# Patient Record
Sex: MALE | Race: WHITE | NOT HISPANIC OR LATINO | Employment: FULL TIME | URBAN - METROPOLITAN AREA
[De-identification: names, ages, dates, MRNs, and addresses within clinical notes are randomized per-mention and may not be internally consistent; named-entity substitution may affect disease eponyms.]

---

## 2017-02-13 RX ORDER — VALSARTAN AND HYDROCHLOROTHIAZIDE 320; 25 MG/1; MG/1
TABLET, FILM COATED ORAL
Qty: 90 TAB | Refills: 1 | Status: SHIPPED | OUTPATIENT
Start: 2017-02-13 | End: 2017-08-11 | Stop reason: SDUPTHER

## 2017-02-13 RX ORDER — AMLODIPINE BESYLATE 5 MG/1
TABLET ORAL
Qty: 90 TAB | Refills: 1 | Status: SHIPPED | OUTPATIENT
Start: 2017-02-13 | End: 2017-08-11 | Stop reason: SDUPTHER

## 2017-02-13 RX ORDER — METOPROLOL SUCCINATE 100 MG/1
TABLET, EXTENDED RELEASE ORAL
Qty: 90 TAB | Refills: 1 | Status: SHIPPED | OUTPATIENT
Start: 2017-02-13 | End: 2017-08-11 | Stop reason: SDUPTHER

## 2017-03-08 ENCOUNTER — HOSPITAL ENCOUNTER (OUTPATIENT)
Dept: LAB | Facility: MEDICAL CENTER | Age: 47
End: 2017-03-08
Attending: FAMILY MEDICINE
Payer: COMMERCIAL

## 2017-03-08 ENCOUNTER — OFFICE VISIT (OUTPATIENT)
Dept: MEDICAL GROUP | Facility: PHYSICIAN GROUP | Age: 47
End: 2017-03-08
Payer: COMMERCIAL

## 2017-03-08 VITALS
TEMPERATURE: 97.5 F | BODY MASS INDEX: 33.79 KG/M2 | OXYGEN SATURATION: 96 % | WEIGHT: 236 LBS | HEART RATE: 84 BPM | DIASTOLIC BLOOD PRESSURE: 60 MMHG | HEIGHT: 70 IN | RESPIRATION RATE: 15 BRPM | SYSTOLIC BLOOD PRESSURE: 120 MMHG

## 2017-03-08 DIAGNOSIS — E11.9 DIABETES MELLITUS WITHOUT COMPLICATION (HCC): ICD-10-CM

## 2017-03-08 DIAGNOSIS — R53.83 OTHER FATIGUE: ICD-10-CM

## 2017-03-08 DIAGNOSIS — E55.9 VITAMIN D DEFICIENCY: ICD-10-CM

## 2017-03-08 DIAGNOSIS — I10 ESSENTIAL HYPERTENSION, BENIGN: ICD-10-CM

## 2017-03-08 DIAGNOSIS — E78.2 MIXED HYPERLIPIDEMIA: ICD-10-CM

## 2017-03-08 DIAGNOSIS — N52.8 OTHER MALE ERECTILE DYSFUNCTION: ICD-10-CM

## 2017-03-08 DIAGNOSIS — E66.9 OBESITY (BMI 30.0-34.9): ICD-10-CM

## 2017-03-08 PROBLEM — N52.9 ERECTILE DYSFUNCTION: Status: ACTIVE | Noted: 2017-03-08

## 2017-03-08 LAB
25(OH)D3 SERPL-MCNC: 15 NG/ML (ref 30–100)
ALBUMIN SERPL BCP-MCNC: 4.4 G/DL (ref 3.2–4.9)
ALBUMIN/GLOB SERPL: 1.5 G/DL
ALP SERPL-CCNC: 82 U/L (ref 30–99)
ALT SERPL-CCNC: 28 U/L (ref 2–50)
ANION GAP SERPL CALC-SCNC: 8 MMOL/L (ref 0–11.9)
AST SERPL-CCNC: 14 U/L (ref 12–45)
BASOPHILS # BLD AUTO: 0.04 K/UL (ref 0–0.12)
BASOPHILS NFR BLD AUTO: 0.8 % (ref 0–1.8)
BILIRUB SERPL-MCNC: 2 MG/DL (ref 0.1–1.5)
BUN SERPL-MCNC: 11 MG/DL (ref 8–22)
CALCIUM SERPL-MCNC: 10.4 MG/DL (ref 8.5–10.5)
CHLORIDE SERPL-SCNC: 97 MMOL/L (ref 96–112)
CHOLEST SERPL-MCNC: 162 MG/DL (ref 100–199)
CO2 SERPL-SCNC: 31 MMOL/L (ref 20–33)
CREAT SERPL-MCNC: 0.95 MG/DL (ref 0.5–1.4)
CREAT UR-MCNC: 134.3 MG/DL
EOSINOPHIL # BLD: 0.12 K/UL (ref 0–0.51)
EOSINOPHIL NFR BLD AUTO: 2.3 % (ref 0–6.9)
ERYTHROCYTE [DISTWIDTH] IN BLOOD BY AUTOMATED COUNT: 40.5 FL (ref 35.9–50)
EST. AVERAGE GLUCOSE BLD GHB EST-MCNC: 324 MG/DL
GLOBULIN SER CALC-MCNC: 3 G/DL (ref 1.9–3.5)
GLUCOSE SERPL-MCNC: 348 MG/DL (ref 65–99)
HBA1C MFR BLD: 12.9 % (ref 0–5.6)
HCT VFR BLD AUTO: 48.4 % (ref 42–52)
HDLC SERPL-MCNC: 44 MG/DL
HGB BLD-MCNC: 16.8 G/DL (ref 14–18)
IMM GRANULOCYTES # BLD AUTO: 0.01 K/UL (ref 0–0.11)
IMM GRANULOCYTES NFR BLD AUTO: 0.2 % (ref 0–0.9)
LDLC SERPL CALC-MCNC: 59 MG/DL
LYMPHOCYTES # BLD: 1.62 K/UL (ref 1–4.8)
LYMPHOCYTES NFR BLD AUTO: 30.4 % (ref 22–41)
MCH RBC QN AUTO: 31 PG (ref 27–33)
MCHC RBC AUTO-ENTMCNC: 34.7 G/DL (ref 33.7–35.3)
MCV RBC AUTO: 89.3 FL (ref 81.4–97.8)
MICROALBUMIN UR-MCNC: 2.6 MG/DL
MICROALBUMIN/CREAT UR: 19 MG/G (ref 0–30)
MONOCYTES # BLD: 0.44 K/UL (ref 0–0.85)
MONOCYTES NFR BLD AUTO: 8.3 % (ref 0–13.4)
NEUTROPHILS # BLD: 3.1 K/UL (ref 1.82–7.42)
NEUTROPHILS NFR BLD AUTO: 58 % (ref 44–72)
NRBC # BLD AUTO: 0 K/UL
NRBC BLD-RTO: 0 /100 WBC
PLATELET # BLD AUTO: 250 K/UL (ref 164–446)
PMV BLD AUTO: 10.4 FL (ref 9–12.9)
POTASSIUM SERPL-SCNC: 3.7 MMOL/L (ref 3.6–5.5)
PROT SERPL-MCNC: 7.4 G/DL (ref 6–8.2)
RBC # BLD AUTO: 5.42 M/UL (ref 4.7–6.1)
SODIUM SERPL-SCNC: 136 MMOL/L (ref 135–145)
T4 FREE SERPL-MCNC: 1.07 NG/DL (ref 0.53–1.43)
TESTOST SERPL-MCNC: 321 NG/DL (ref 175–781)
TRIGL SERPL-MCNC: 294 MG/DL (ref 0–149)
TSH SERPL DL<=0.005 MIU/L-ACNC: 1.92 UIU/ML (ref 0.3–3.7)
WBC # BLD AUTO: 5.3 K/UL (ref 4.8–10.8)

## 2017-03-08 PROCEDURE — 84439 ASSAY OF FREE THYROXINE: CPT

## 2017-03-08 PROCEDURE — 82570 ASSAY OF URINE CREATININE: CPT

## 2017-03-08 PROCEDURE — 80061 LIPID PANEL: CPT

## 2017-03-08 PROCEDURE — 83036 HEMOGLOBIN GLYCOSYLATED A1C: CPT

## 2017-03-08 PROCEDURE — 82306 VITAMIN D 25 HYDROXY: CPT

## 2017-03-08 PROCEDURE — 84443 ASSAY THYROID STIM HORMONE: CPT

## 2017-03-08 PROCEDURE — 84403 ASSAY OF TOTAL TESTOSTERONE: CPT

## 2017-03-08 PROCEDURE — 80053 COMPREHEN METABOLIC PANEL: CPT

## 2017-03-08 PROCEDURE — 99214 OFFICE O/P EST MOD 30 MIN: CPT | Performed by: FAMILY MEDICINE

## 2017-03-08 PROCEDURE — 36415 COLL VENOUS BLD VENIPUNCTURE: CPT

## 2017-03-08 PROCEDURE — 85025 COMPLETE CBC W/AUTO DIFF WBC: CPT

## 2017-03-08 PROCEDURE — 82043 UR ALBUMIN QUANTITATIVE: CPT

## 2017-03-08 RX ORDER — SILDENAFIL 100 MG/1
100 TABLET, FILM COATED ORAL PRN
Qty: 10 TAB | Refills: 3 | Status: SHIPPED | OUTPATIENT
Start: 2017-03-08 | End: 2017-09-13 | Stop reason: SDUPTHER

## 2017-03-08 ASSESSMENT — PATIENT HEALTH QUESTIONNAIRE - PHQ9: CLINICAL INTERPRETATION OF PHQ2 SCORE: 0

## 2017-03-08 NOTE — MR AVS SNAPSHOT
"Andrez Sanchez   3/8/2017 9:20 AM   Office Visit   MRN: [id number]    Department:  [facility]   Dept Phone:  [phone]    Description:  Male : 1970   Provider:  Familia John M.D.           Reason for Visit     Follow-Up           Allergies as of 3/8/2017     No Known Allergies      You were diagnosed with     Diabetes mellitus without complication (CMS-[facility])   [825333]       Mixed hyperlipidemia   [272.2.ICD-9-CM]       Vitamin D deficiency   [8630906]       Essential hypertension, benign   [401.1.ICD-9-CM]       Other fatigue   [3434976]       Other male erectile dysfunction   [4825180]         Vital Signs     Blood Pressure Pulse Temperature Respirations Height Weight    120/60 mmHg 84 36.4 °C (97.5 °F) 15 1.778 m (5' 10\") 107.049 kg (236 lb)    Body Mass Index Oxygen Saturation Smoking Status             33.86 kg/m2 96% Never Smoker          Basic Information     Date Of Birth Sex Race Ethnicity Preferred Language    1970 Male White Non- English      Your appointments     Sep 13, 2017 10:20 AM   Established Patient with Familia John M.D.   [facility] ([address] [address] [phone]           You will be receiving a confirmation call a few days before your appointment from our automated call confirmation system.              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   " 6/29/2016 - Present    Erectile dysfunction N52.9   3/8/2017 - Present      Health Maintenance        Date Due Completion Dates    IMM HEP B VACCINE (1 of 3 - Primary Series) 1970 ---    RETINAL SCREENING 8/9/1989 ---    IMM PNEUMOCOCCAL 19-64 (ADULT) MEDIUM RISK SERIES (1 of 1 - PPSV23) 8/9/1990 ---    IMM INFLUENZA (1) 9/1/2016 11/12/2015    A1C SCREENING 3/6/2017 9/6/2016, 6/7/2016, 3/9/2016    DIABETES MONOFILAMENT / LE EXAM 6/7/2017 6/7/2016 (Done)    Override on 6/7/2016: Done    FASTING LIPID PROFILE 9/6/2017 9/6/2016, 6/7/2016, 3/9/2016, 9/24/2014    URINE ACR / MICROALBUMIN 9/6/2017 9/6/2016, 6/7/2016, 3/9/2016    SERUM CREATININE 9/6/2017 9/6/2016, 6/7/2016, 3/9/2016, 5/1/2015, 9/24/2014, 1/18/2008    IMM DTaP/Tdap/Td Vaccine (2 - Td) 9/16/2024 9/16/2014            Current Immunizations     Influenza Vaccine Quad Inj (Preserved) 10/20/2016, 11/12/2015    Tdap Vaccine 9/16/2014      Below and/or attached are the medications your provider expects you to take. Review all of your home medications and newly ordered medications with your provider and/or pharmacist. Follow medication instructions as directed by your provider and/or pharmacist. Please keep your medication list with you and share with your provider. Update the information when medications are discontinued, doses are changed, or new medications (including over-the-counter products) are added; and carry medication information at all times in the event of emergency situations     Allergies:  No Known Allergies          Medications  Valid as of: March 08, 2017 - 10:15 AM    Generic Name Brand Name Tablet Size Instructions for use    AmLODIPine Besylate (Tab) NORVASC 5 MG TAKE 1 TABLET DAILY        Aspirin (Chew Tab) ASA 81 MG Take 1 Tab by mouth every day.        Atorvastatin Calcium (Tab) LIPITOR 80 MG Take 1 Tab by mouth every evening.        Blood Glucose Monitoring Suppl (Misc) Blood Glucose Monitoring Suppl SUPPLIES Test strips order: Test  strips for One Touch Ultra Mini meter. Sig: use daily and prn ssx high or low sugar #100 RF x 3        Cholecalciferol (Tab) vitamin D3 (cholecalciferol) 1000 UNIT Take 2 Tabs by mouth every day.        MetFORMIN HCl (Tab) GLUCOPHAGE 1000 MG TAKE 1 TABLET TWICE A DAY WITH MEALS        Metoprolol Succinate (TABLET SR 24 HR) TOPROL  MG TAKE 1 TABLET DAILY        Niacin (Tab) niacin 500 MG Take 1 Tab by mouth every day.        Sildenafil Citrate (Tab) VIAGRA 100 MG Take 1 Tab by mouth as needed for Erectile Dysfunction.        Valsartan-Hydrochlorothiazide (Tab) DIOVAN--25 MG TAKE 1 TABLET DAILY        .                 Medicines prescribed today were sent to:     [id number] [place], [place] - [address] [address]    Phone: [phone] Fax: [phone]    Open 24 Hours?: No    [facility] - [place] [address]    Phone: [phone] Fax: [phone]    Open 24 Hours?: No    [facility] - [place] [address]    Phone: [phone] Fax: [phone]    Open 24 Hours?: No      Medication refill instructions:       If your prescription bottle indicates you have medication refills left, it is not necessary to call your provider’s office. Please contact your pharmacy and they will refill your medication.    If your prescription bottle indicates you do not have any refills left, you may request refills at any time through one of the following ways: The online WhoWantsMe system (except Urgent Care), by calling your provider’s office, or by asking your pharmacy to contact your provider’s office with a refill request. Medication refills are processed only during regular business hours and may not be available until the next business day. Your provider may request additional information or to have a follow-up  visit with you prior to refilling your medication.   *Please Note: Medication refills are assigned a new Rx number when refilled electronically. Your pharmacy may indicate that no refills were authorized even though a new prescription for the same medication is available at the pharmacy. Please request the medicine by name with the pharmacy before contacting your provider for a refill.        Your To Do List     Future Labs/Procedures Complete By Expires    CBC WITH DIFFERENTIAL  As directed 3/9/2018    COMP METABOLIC PANEL  As directed 3/9/2018    FREE THYROXINE  As directed 3/9/2018    HEMOGLOBIN A1C  As directed 3/9/2018    LIPID PROFILE  As directed 3/9/2018    MICROALBUMIN CREAT RATIO URINE  As directed 3/9/2018    TESTOSTERONE SERUM  As directed 3/9/2018    TSH  As directed 3/9/2018         Urlist Access Code: [email or web address]  Expires: 3/21/2017 10:08 AM    Urlist  A secure, online tool to manage your health information     [employer]’s Urlist® is a secure, online tool that connects you to your personalized health information from the privacy of your home -- day or night - making it very easy for you to manage your healthcare. Once the activation process is completed, you can even access your medical information using the Urlist tin, which is available for free in the Apple Tin store or Google Play store.     Urlist provides the following levels of access (as shown below):   My Chart Features   [facility] Primary [facility] Doctor [facility]  Specialists [facility]  Urgent  Care Non-[facility]  Primary Care  Doctor   Email your healthcare team securely and privately 24/7 X X X    Manage appointments: schedule your next appointment; view details of past/upcoming appointments X      Request prescription refills. X      View recent personal medical records, including lab and immunizations X X X X   View health record, including health history, allergies, medications X X X X   Read reports about your outpatient visits,  procedures, consult and ER notes X X X X   See your discharge summary, which is a recap of your hospital and/or ER visit that includes your diagnosis, lab results, and care plan. X X       How to register for [employer]Click:  1. Go to  [email or web address].  2. Click on the Sign Up Now box, which takes you to the New Member Sign Up page. You will need to provide the following information:  a. Enter your TalentClick Access Code exactly as it appears at the top of this page. (You will not need to use this code after you’ve completed the sign-up process. If you do not sign up before the expiration date, you must request a new code.)   b. Enter your date of birth.   c. Enter your home email address.   d. Click Submit, and follow the next screen’s instructions.  3. Create a viVoodt ID. This will be your viVoodt login ID and cannot be changed, so think of one that is secure and easy to remember.  4. Create a viVoodt password. You can change your password at any time.  5. Enter your Password Reset Question and Answer. This can be used at a later time if you forget your password.   6. Enter your e-mail address. This allows you to receive e-mail notifications when new information is available in TalentClick.  7. Click Sign Up. You can now view your health information.    For assistance activating your [employer]entClick account, call [phone]

## 2017-03-08 NOTE — PROGRESS NOTES
Patient comes in having his diabetes reassessed. He says his blood sugars are running around 130-150. He feels good. He denies chest pains or shortness of breath. He is working on losing weight and has lost 13 pounds since June 2016.  The patient complains of erectile dysfunction. He wonders about trying Viagra. He still has a good sex drive.  He complains of being fatigued.    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 10 Tab 3   • metoprolol SR (TOPROL XL) 100 MG TABLET SR 24 HR TAKE 1 TABLET DAILY 90 Tab 1   • amlodipine (NORVASC) 5 MG Tab TAKE 1 TABLET DAILY 90 Tab 1   • metformin (GLUCOPHAGE) 1000 MG tablet TAKE 1 TABLET TWICE A DAY WITH MEALS 180 Tab 1   • valsartan-hydrochlorothiazide (DIOVAN-HCT) 320-25 MG per tablet TAKE 1 TABLET DAILY 90 Tab 1   • niacin 500 MG Tab Take 1 Tab by mouth every day. 90 Tab 3   • aspirin (ASA) 81 MG Chew Tab chewable tablet Take 1 Tab by mouth every day. 100 Tab 11   • atorvastatin (LIPITOR) 80 MG tablet Take 1 Tab by mouth every evening. 90 Tab 3   • vitamin D3, cholecalciferol, 1000 UNIT Tab Take 2 Tabs by mouth every day. 100 Tab 3   • Blood Glucose Monitoring Suppl SUPPLIES MISC Test strips order: Test strips for One Touch Ultra Mini meter. Sig: use daily and prn ssx high or low sugar #100 RF x 3 100 Strip 3     No current facility-administered medications for this visit.        Family History   Problem Relation Age of Onset   • Cancer Mother    • Diabetes Paternal Grandmother        Social History     Social History    • Marital Status:      Spouse Name: N/A   • Number of Children: N/A   • Years of Education: N/A     Occupational History   • Not on file.     Social History Main Topics   • Smoking status: Never Smoker    • Smokeless tobacco: Never Used   • Alcohol Use: Yes      Comment: rare   • Drug Use: No   • Sexual Activity:     Partners: Female     Birth Control/ Protection: Condom      Comment: wife had double mastectomy for breast cancer     Other Topics Concern   • Not on file     Social History Narrative          Physical Exam   Constitutional: He is oriented. He appears well-developed and well-nourished. No distress.   HENT:   Head: Normocephalic and atraumatic.   Right Ear: External ear normal. Ear canal and TM normal   Left Ear: External ear normal. Ear canal and TM normal   Nose: Nose normal.   Mouth/Throat: Oropharynx is clear and moist.   Eyes: Conjunctivae and extraocular motions are normal. Pupils are equal, round, and reactive to light.        Fundi benign bilaterally   Neck: No thyromegaly present.   Cardiovascular: Normal rate, regular rhythm, normal heart sounds and intact distal pulses.  Exam reveals no gallop.    No murmur heard.  Pulmonary/Chest: Effort normal and breath sounds normal. No respiratory distress. He has no wheezes. He has no rales.   Abdominal: Soft. Bowel sounds are normal. No hepatosplenomegaly. He exhibits no distension. No tenderness. He has no rebound and no guarding.   Musculoskeletal: Normal range of motion. He exhibits no edema and no tenderness.   Lymphadenopathy:     He has no cervical adenopathy.  No supraclavicular adenopathy.   Neurological: He is alert and oriented. He has normal reflexes.        Babinskis downgoing bilaterally --The patient has intact sensation to monofilament light touch over both feet. No sores or ulcers are noted over the feet.    Skin: Skin is warm and dry. No rash noted. No erythema.   Psychiatric: He has a normal mood and appropriate affect. His  behavior is normal. Judgment and thought content normal.     1. Diabetes mellitus without complication (CMS-HCC) --needs reassessment  COMP METABOLIC PANEL    LIPID PROFILE    HEMOGLOBIN A1C    MICROALBUMIN CREAT RATIO URINE    Diabetic Monofilament LE Exam   2. Mixed hyperlipidemia --needs reassessment  COMP METABOLIC PANEL    LIPID PROFILE   3. Vitamin D deficiency --needs reassessment  VITAMIN D 25-HYDROXY   4. Essential hypertension, benign --controlled  CBC WITH DIFFERENTIAL    COMP METABOLIC PANEL    LIPID PROFILE   5. Other fatigue --etiology not clear  TSH    FREE THYROXINE    TESTOSTERONE SERUM   6. Other male erectile dysfunction  TESTOSTERONE SERUM    sildenafil citrate (VIAGRA) 100 MG tablet--given prescription to mail to [facility]    7. Obesity (BMI 30.0-34.9)   I encouraged the patient to continue working on losing weight.      Recheck with me 6 months or when necessary    Please note that this dictation was created using voice recognition software. I have worked with consultants from the vendor as well as technical experts from [employer] to optimize the interface. I have made every reasonable attempt to correct obvious errors, but I expect that there are errors of grammar and possibly content that I did not discover before finalizing the note.

## 2017-03-08 NOTE — PATIENT INSTRUCTIONS
Patient given written instructions regarding labs, medications, referrals, dietary and lifestyle management, and return visit.    Familia John MD

## 2017-03-10 DIAGNOSIS — E11.9 DIABETES MELLITUS WITHOUT COMPLICATION (HCC): ICD-10-CM

## 2017-03-10 DIAGNOSIS — E55.9 VITAMIN D DEFICIENCY: ICD-10-CM

## 2017-03-10 RX ORDER — MELATONIN
3000 DAILY
Qty: 100 TAB | Refills: 3
Start: 2017-03-10 | End: 2021-01-28

## 2017-03-10 RX ORDER — GLIPIZIDE 5 MG/1
5 TABLET ORAL 2 TIMES DAILY
Qty: 180 TAB | Refills: 3 | Status: SHIPPED | OUTPATIENT
Start: 2017-03-10 | End: 2018-01-03 | Stop reason: SDUPTHER

## 2017-07-05 RX ORDER — ATORVASTATIN CALCIUM 80 MG/1
TABLET, FILM COATED ORAL
Qty: 90 TAB | Refills: 3 | Status: SHIPPED | OUTPATIENT
Start: 2017-07-05 | End: 2018-07-15 | Stop reason: SDUPTHER

## 2017-08-14 RX ORDER — AMLODIPINE BESYLATE 5 MG/1
TABLET ORAL
Qty: 90 TAB | Refills: 3 | Status: SHIPPED | OUTPATIENT
Start: 2017-08-14 | End: 2018-09-24 | Stop reason: SDUPTHER

## 2017-08-14 RX ORDER — VALSARTAN AND HYDROCHLOROTHIAZIDE 320; 25 MG/1; MG/1
TABLET, FILM COATED ORAL
Qty: 90 TAB | Refills: 3 | Status: SHIPPED | OUTPATIENT
Start: 2017-08-14 | End: 2018-09-24 | Stop reason: SDUPTHER

## 2017-08-14 RX ORDER — METOPROLOL SUCCINATE 100 MG/1
TABLET, EXTENDED RELEASE ORAL
Qty: 90 TAB | Refills: 3 | Status: SHIPPED | OUTPATIENT
Start: 2017-08-14 | End: 2018-09-24 | Stop reason: SDUPTHER

## 2017-09-13 ENCOUNTER — OFFICE VISIT (OUTPATIENT)
Dept: MEDICAL GROUP | Facility: PHYSICIAN GROUP | Age: 47
End: 2017-09-13
Payer: COMMERCIAL

## 2017-09-13 VITALS
HEART RATE: 84 BPM | BODY MASS INDEX: 33.07 KG/M2 | HEIGHT: 70 IN | WEIGHT: 231 LBS | OXYGEN SATURATION: 96 % | DIASTOLIC BLOOD PRESSURE: 80 MMHG | RESPIRATION RATE: 14 BRPM | TEMPERATURE: 97.5 F | SYSTOLIC BLOOD PRESSURE: 128 MMHG

## 2017-09-13 DIAGNOSIS — E55.9 VITAMIN D DEFICIENCY: ICD-10-CM

## 2017-09-13 DIAGNOSIS — K42.9 UMBILICAL HERNIA WITHOUT OBSTRUCTION AND WITHOUT GANGRENE: ICD-10-CM

## 2017-09-13 DIAGNOSIS — E11.9 DIABETES MELLITUS WITHOUT COMPLICATION (HCC): ICD-10-CM

## 2017-09-13 DIAGNOSIS — N52.9 ERECTILE DYSFUNCTION, UNSPECIFIED ERECTILE DYSFUNCTION TYPE: ICD-10-CM

## 2017-09-13 DIAGNOSIS — I10 ESSENTIAL HYPERTENSION, BENIGN: ICD-10-CM

## 2017-09-13 DIAGNOSIS — E78.2 MIXED HYPERLIPIDEMIA: ICD-10-CM

## 2017-09-13 PROCEDURE — 99214 OFFICE O/P EST MOD 30 MIN: CPT | Performed by: FAMILY MEDICINE

## 2017-09-13 PROCEDURE — 92250 FUNDUS PHOTOGRAPHY W/I&R: CPT | Mod: TC,51 | Performed by: FAMILY MEDICINE

## 2017-09-13 RX ORDER — SILDENAFIL 100 MG/1
100 TABLET, FILM COATED ORAL PRN
Qty: 10 TAB | Refills: 3 | Status: SHIPPED | OUTPATIENT
Start: 2017-09-13 | End: 2021-01-28

## 2017-09-13 NOTE — PROGRESS NOTES
Patient comes in to reassess his diabetes. He never got the letter that I sent him about starting glipizide in addition to the metformin that he is taking. He has lost 9 pounds, however, with diet and exercise.  He feels well. He has no chest pains or shortness of breath.  He refills of some of his medications.  He needs to have a retinal exam.    I reviewed the following    Past Medical History:   Diagnosis Date   • Diabetes (CMS-HCC)    • Hyperlipidemia    • Hypertension    • Obesity         Past Surgical History:   Procedure Laterality Date   • UVULOPHARYNGOPALATOPLASTY  2006    uvulectomy for sleep apnea   • AV FISTULA REVISION  1973    Repair Congenital Chest AV Fistula       No Known Allergies    Current Outpatient Prescriptions   Medication Sig Dispense Refill   • sildenafil citrate (VIAGRA) 100 MG tablet Take 1 Tab by mouth as needed for Erectile Dysfunction. 10 Tab 3   • metformin (GLUCOPHAGE) 1000 MG tablet TAKE 1 TABLET TWICE A DAY WITH MEALS 180 Tab 3   • metoprolol SR (TOPROL XL) 100 MG TABLET SR 24 HR TAKE 1 TABLET DAILY 90 Tab 3   • valsartan-hydrochlorothiazide (DIOVAN-HCT) 320-25 MG per tablet TAKE 1 TABLET DAILY 90 Tab 3   • amlodipine (NORVASC) 5 MG Tab TAKE 1 TABLET DAILY 90 Tab 3   • atorvastatin (LIPITOR) 80 MG tablet TAKE 1 TABLET DAILY IN THE EVENING 90 Tab 3   • glipiZIDE (GLUCOTROL) 5 MG Tab Take 1 Tab by mouth 2 times a day. 180 Tab 3   • vitamin D3, cholecalciferol, 1000 UNIT Tab Take 3 Tabs by mouth every day. 100 Tab 3   • niacin 500 MG Tab Take 1 Tab by mouth every day. 90 Tab 3   • aspirin (ASA) 81 MG Chew Tab chewable tablet Take 1 Tab by mouth every day. 100 Tab 11   • Blood Glucose Monitoring Suppl SUPPLIES MISC Test strips order: Test strips for One Touch Ultra Mini meter. Sig: use daily and prn ssx high or low sugar #100 RF x 3 100 Strip 3     No current facility-administered medications for this visit.         Family History   Problem Relation Age of Onset   • Cancer Mother    •  Diabetes Paternal Grandmother        Social History     Social History   • Marital status:      Spouse name: N/A   • Number of children: N/A   • Years of education: N/A     Occupational History   • Not on file.     Social History Main Topics   • Smoking status: Never Smoker   • Smokeless tobacco: Never Used   • Alcohol use Yes      Comment: rare   • Drug use: No   • Sexual activity: Yes     Partners: Female     Birth control/ protection: Condom      Comment: wife had double mastectomy for breast cancer     Other Topics Concern   • Not on file     Social History Narrative   • No narrative on file          Physical Exam   Constitutional: He is oriented. He appears well-developed and obese. No distress.   HENT:   Head: Normocephalic and atraumatic.   Right Ear: External ear normal. Ear canal and TM normal   Left Ear: External ear normal. Ear canal and TM normal   Nose: Nose normal.   Mouth/Throat: Oropharynx is clear and moist.   Eyes: Conjunctivae and extraocular motions are normal. Pupils are equal, round, and reactive to light.        Fundi benign bilaterally   Neck: No thyromegaly present.   Cardiovascular: Normal rate, regular rhythm, normal heart sounds and intact distal pulses.  Exam reveals no gallop.    No murmur heard.  Pulmonary/Chest: Effort normal and breath sounds normal. No respiratory distress. He has no wheezes. He has no rales.   Abdominal: Soft. Bowel sounds are normal. No hepatosplenomegaly. He exhibits no distension. No tenderness. He has no rebound and no guarding.   Musculoskeletal: Normal range of motion. He exhibits no edema and no tenderness.   Lymphadenopathy:     He has no cervical adenopathy.  No supraclavicular adenopathy.   Neurological: He is alert and oriented. He has normal reflexes.        Babinskis downgoing bilaterally --The patient has intact sensation to monofilament light touch over both feet. No sores or ulcers are noted over the feet.    Skin: Skin is warm and dry. No  rash noted. No erythema.   Psychiatric: He has a normal mood and appropriate affect. His behavior is normal. Judgment and thought content normal.    1. Diabetes mellitus without complication (CMS-HCC) --Needs reassessment  COMP METABOLIC PANEL    LIPID PROFILE    MICROALBUMIN CREAT RATIO URINE    HEMOGLOBIN A1C    Diabetic Monofilament LE Exam    POCT Retinal Eye Exam   2. Mixed hyperlipidemia --needs reassessment  COMP METABOLIC PANEL    LIPID PROFILE   3. Vitamin D deficiency--Needs reassessment  VITAMIN D 25-HYDROXY   4. Umbilical hernia without obstruction and without gangrene     5. Essential hypertension, benign --controlled  CBC WITH DIFFERENTIAL    COMP METABOLIC PANEL    LIPID PROFILE   6. Erectile dysfunction, unspecified erectile dysfunction type--stable  sildenafil citrate (VIAGRA) 100 MG tablet      Recheck 6 months or when necessary    Please note that this dictation was created using voice recognition software. I have worked with consultants from the vendor as well as technical experts from [employer] to optimize the interface. I have made every reasonable attempt to correct obvious errors, but I expect that there are errors of grammar and possibly content that I did not discover before finalizing the note.

## 2017-09-13 NOTE — PATIENT INSTRUCTIONS
Patient given written instructions regarding labs, imaging, medications, referrals, dietary and lifestyle management, and return visit.    Familia John MD

## 2017-09-29 LAB
25(OH)D3+25(OH)D2 SERPL-MCNC: 32.2 NG/ML (ref 30–100)
ALBUMIN SERPL-MCNC: 4.2 G/DL (ref 3.5–5.5)
ALBUMIN/CREAT UR: 3.3 MG/G CREAT (ref 0–30)
ALBUMIN/GLOB SERPL: 1.7 {RATIO} (ref 1.2–2.2)
ALP SERPL-CCNC: 72 IU/L (ref 39–117)
ALT SERPL-CCNC: 21 IU/L (ref 0–44)
AST SERPL-CCNC: 15 IU/L (ref 0–40)
BASOPHILS # BLD AUTO: 0.1 X10E3/UL (ref 0–0.2)
BASOPHILS NFR BLD AUTO: 1 %
BILIRUB SERPL-MCNC: 0.9 MG/DL (ref 0–1.2)
BUN SERPL-MCNC: 11 MG/DL (ref 6–24)
BUN/CREAT SERPL: 12 (ref 9–20)
CALCIUM SERPL-MCNC: 9.5 MG/DL (ref 8.7–10.2)
CHLORIDE SERPL-SCNC: 97 MMOL/L (ref 96–106)
CHOLEST SERPL-MCNC: 147 MG/DL (ref 100–199)
CO2 SERPL-SCNC: 26 MMOL/L (ref 18–29)
COMMENT 011824: ABNORMAL
CREAT SERPL-MCNC: 0.89 MG/DL (ref 0.76–1.27)
CREAT UR-MCNC: 101.1 MG/DL
EOSINOPHIL # BLD AUTO: 0.2 X10E3/UL (ref 0–0.4)
EOSINOPHIL NFR BLD AUTO: 3 %
ERYTHROCYTE [DISTWIDTH] IN BLOOD BY AUTOMATED COUNT: 13.2 % (ref 12.3–15.4)
GLOBULIN SER CALC-MCNC: 2.5 G/DL (ref 1.5–4.5)
GLUCOSE SERPL-MCNC: 341 MG/DL (ref 65–99)
HBA1C MFR BLD: 11.7 % (ref 4.8–5.6)
HCT VFR BLD AUTO: 43.9 % (ref 37.5–51)
HDLC SERPL-MCNC: 42 MG/DL
HGB BLD-MCNC: 15.2 G/DL (ref 12.6–17.7)
IMM GRANULOCYTES # BLD: 0 X10E3/UL (ref 0–0.1)
IMM GRANULOCYTES NFR BLD: 0 %
IMMATURE CELLS  115398: NORMAL
LDLC SERPL CALC-MCNC: 62 MG/DL (ref 0–99)
LYMPHOCYTES # BLD AUTO: 1.6 X10E3/UL (ref 0.7–3.1)
LYMPHOCYTES NFR BLD AUTO: 31 %
MCH RBC QN AUTO: 31.1 PG (ref 26.6–33)
MCHC RBC AUTO-ENTMCNC: 34.6 G/DL (ref 31.5–35.7)
MCV RBC AUTO: 90 FL (ref 79–97)
MICROALBUMIN UR-MCNC: 3.3 UG/ML
MONOCYTES # BLD AUTO: 0.5 X10E3/UL (ref 0.1–0.9)
MONOCYTES NFR BLD AUTO: 10 %
MORPHOLOGY BLD-IMP: NORMAL
NEUTROPHILS # BLD AUTO: 2.9 X10E3/UL (ref 1.4–7)
NEUTROPHILS NFR BLD AUTO: 55 %
NRBC BLD AUTO-RTO: NORMAL %
PLATELET # BLD AUTO: 227 X10E3/UL (ref 150–379)
POTASSIUM SERPL-SCNC: 4.1 MMOL/L (ref 3.5–5.2)
PROT SERPL-MCNC: 6.7 G/DL (ref 6–8.5)
RBC # BLD AUTO: 4.88 X10E6/UL (ref 4.14–5.8)
SODIUM SERPL-SCNC: 139 MMOL/L (ref 134–144)
TRIGL SERPL-MCNC: 213 MG/DL (ref 0–149)
VLDLC SERPL CALC-MCNC: 43 MG/DL (ref 5–40)
WBC # BLD AUTO: 5.2 X10E3/UL (ref 3.4–10.8)

## 2017-10-02 ENCOUNTER — TELEPHONE (OUTPATIENT)
Dept: MEDICAL GROUP | Facility: PHYSICIAN GROUP | Age: 47
End: 2017-10-02

## 2017-10-02 NOTE — LETTER
October 2, 2017         Andrez Zenon Sanchez  [address] [address]        Dear Andrez:      Below are the results from your recent visit:    Please advise pt that the vitamin D level has improved from 15 up to 32. Please continue supplementation as directed by Dr. John.     Loida Nielsen D.O.   Covering for Familia John M.D.     Resulted Orders   VITAMIN D 25-HYDROXY   Result Value Ref Range    25-Hydroxy   Vitamin D 25 32.2 30.0 - 100.0 ng/mL      Comment:      Vitamin D deficiency has been defined by the [place] of  Medicine and an Endocrine Society practice guideline as a  level of serum 25-OH vitamin D less than 20 ng/mL (1,2).  The Endocrine Society went on to further define vitamin D  insufficiency as a level between 21 and 29 ng/mL (2).  1. IOM ([place] of Medicine). 2010. Dietary reference  intakes for calcium and D. [place]: The  National Academies Press.  2. Moisés MF, Fernando NC, Kenia VILLA, et al.  Evaluation, treatment, and prevention of vitamin D  deficiency: an Endocrine Society clinical practice  guideline. JCEM. 2011 Jul; 96(7):1911-30.     POCT Retinal Eye Exam    Narrative    No diabetic retinopathy     Impression    No diabetic retinopathy          Electronically Signed

## 2017-10-02 NOTE — TELEPHONE ENCOUNTER
----- Message from Loida Nielsen D.O. sent at 9/29/2017  5:19 PM PDT -----  Please advise pt that the vitamin D level has improved from 15 up to 32. Please continue supplementation as directed by Dr. John.    Loida Nielsen D.O.   Covering for Familia John M.D.

## 2017-10-03 ENCOUNTER — TELEPHONE (OUTPATIENT)
Dept: MEDICAL GROUP | Facility: PHYSICIAN GROUP | Age: 47
End: 2017-10-03

## 2017-10-03 NOTE — TELEPHONE ENCOUNTER
----- Message from Familia John M.D. sent at 10/2/2017  9:57 PM PDT -----  Dear Andrez    Your blood tests look good except for your blood sugar and A1C. Stay on the combination of both Metformin and glipizide as we discussed to improve this.    Familia John M.D.

## 2017-10-03 NOTE — LETTER
October 3, 2017         Andrez Sanchez  [address] [facility] [address]        Dear Andrez    Your blood tests look good except for your blood sugar and A1C. Stay on the combination of both Metformin and glipizide as we discussed to improve this.    Familia John M.D.    Resulted Orders   CBC WITH DIFFERENTIAL   Result Value Ref Range    WBC 5.2 3.4 - 10.8 x10E3/uL    RBC 4.88 4.14 - 5.80 x10E6/uL    Hemoglobin 15.2 12.6 - 17.7 g/dL    Hematocrit 43.9 37.5 - 51.0 %    MCV 90 79 - 97 fL    MCH 31.1 26.6 - 33.0 pg    MCHC 34.6 31.5 - 35.7 g/dL    RDW 13.2 12.3 - 15.4 %    Platelet Count 227 150 - 379 x10E3/uL    Neutrophils-Polys 55 %    Lymphocytes 31 %    Monocytes 10 %    Eosinophils 3 %    Basophils 1 %    Immature Cells CANCELED       Comment:      Result canceled by the ancillary    Neutrophils (Absolute) 2.9 1.4 - 7.0 x10E3/uL    Lymphs (Absolute) 1.6 0.7 - 3.1 x10E3/uL    Monos (Absolute) 0.5 0.1 - 0.9 x10E3/uL    Eos (Absolute) 0.2 0.0 - 0.4 x10E3/uL    Baso (Absolute) 0.1 0.0 - 0.2 x10E3/uL    Immature Granulocytes 0 %    Immature Granulocytes (abs) 0.0 0.0 - 0.1 x10E3/uL    Nucleated RBC CANCELED       Comment:      Result canceled by the ancillary    Comments-Diff CANCELED       Comment:      Result canceled by the ancillary   COMP METABOLIC PANEL   Result Value Ref Range    Glucose 341 (H) 65 - 99 mg/dL    Bun 11 6 - 24 mg/dL    Creatinine 0.89 0.76 - 1.27 mg/dL    GFR If Non African American 103 >59 mL/min/1.73    GFR If  119 >59 mL/min/1.73    Bun-Creatinine Ratio 12 9 - 20    Sodium 139 134 - 144 mmol/L    Potassium 4.1 3.5 - 5.2 mmol/L    Chloride 97 96 - 106 mmol/L    Co2 26 18 - 29 mmol/L    Calcium 9.5 8.7 - 10.2 mg/dL    Total Protein 6.7 6.0 - 8.5 g/dL    Albumin 4.2 3.5 - 5.5 g/dL    Globulin 2.5 1.5 - 4.5 g/dL    A-G Ratio 1.7 1.2 - 2.2    Total Bilirubin 0.9 0.0 - 1.2 mg/dL    Alkaline Phosphatase 72 39 - 117 IU/L    AST(SGOT) 15 0 - 40 IU/L    ALT(SGPT) 21 0  - 44 IU/L   LIPID PANEL   Result Value Ref Range    Cholesterol,Tot 147 100 - 199 mg/dL    Triglycerides 213 (H) 0 - 149 mg/dL    HDL 42 >39 mg/dL    VLDL Cholesterol Calc 43 (H) 5 - 40 mg/dL    LDL 62 0 - 99 mg/dL    Comment: CANCELED       Comment:      Result canceled by the ancillary   MICROALB/CREAT RATIO RAND. UR   Result Value Ref Range    Creatinine, Random Urine 101.1 Not Estab. mg/dL    Microalbumin, Urine Random 3.3 Not Estab. ug/mL    Microalbumin-Creatinine 3.3 0.0 - 30.0 mg/g creat   HEMOGLOBIN A1C   Result Value Ref Range    Glycohemoglobin 11.7 (H) 4.8 - 5.6 %      Comment:      Pre-diabetes: 5.7 - 6.4  Diabetes: >6.4  Glycemic control for adults with diabetes: <7.0           Electronically Signed

## 2017-11-06 ENCOUNTER — OFFICE VISIT (OUTPATIENT)
Dept: MEDICAL GROUP | Facility: PHYSICIAN GROUP | Age: 47
End: 2017-11-06
Payer: COMMERCIAL

## 2017-11-06 VITALS
OXYGEN SATURATION: 96 % | DIASTOLIC BLOOD PRESSURE: 78 MMHG | SYSTOLIC BLOOD PRESSURE: 122 MMHG | WEIGHT: 234 LBS | HEIGHT: 70 IN | HEART RATE: 104 BPM | BODY MASS INDEX: 33.5 KG/M2

## 2017-11-06 DIAGNOSIS — M79.605 PAIN IN BOTH LOWER EXTREMITIES: ICD-10-CM

## 2017-11-06 DIAGNOSIS — E11.9 DIABETES MELLITUS WITHOUT COMPLICATION (HCC): ICD-10-CM

## 2017-11-06 DIAGNOSIS — M79.604 PAIN IN BOTH LOWER EXTREMITIES: ICD-10-CM

## 2017-11-06 PROCEDURE — 99214 OFFICE O/P EST MOD 30 MIN: CPT | Performed by: FAMILY MEDICINE

## 2017-11-06 RX ORDER — GABAPENTIN 300 MG/1
300 CAPSULE ORAL 2 TIMES DAILY
Qty: 60 CAP | Refills: 3 | Status: SHIPPED | OUTPATIENT
Start: 2017-11-06 | End: 2018-01-03 | Stop reason: SDUPTHER

## 2017-11-06 ASSESSMENT — PAIN SCALES - GENERAL: PAINLEVEL: 3=SLIGHT PAIN

## 2017-11-06 NOTE — PATIENT INSTRUCTIONS
Gabapentin capsules or tablets  What is this medicine?  GABAPENTIN (GA ba pen tin) is used to control partial seizures in adults with epilepsy. It is also used to treat certain types of nerve pain.  This medicine may be used for other purposes; ask your health care provider or pharmacist if you have questions.  COMMON BRAND NAME(S): Gabarone , Neurontin  What should I tell my health care provider before I take this medicine?  They need to know if you have any of these conditions:  -kidney disease  -suicidal thoughts, plans, or attempt; a previous suicide attempt by you or a family member  -an unusual or allergic reaction to gabapentin, other medicines, foods, dyes, or preservatives  -pregnant or trying to get pregnant  -breast-feeding  How should I use this medicine?  Take this medicine by mouth. Swallow it with a drink of water. Follow the directions on the prescription label. If this medicine upsets your stomach, take it with food or milk. Take your medicine at regular intervals. Do not take it more often than directed.  If you are directed to break the 600 or 800 mg tablets in half as part of your dose, the extra half tablet should be used for the next dose. If you have not used the extra half tablet within 3 days, it should be thrown away.  A special MedGuide will be given to you by the pharmacist with each prescription and refill. Be sure to read this information carefully each time.  Talk to your pediatrician regarding the use of this medicine in children. Special care may be needed.  Overdosage: If you think you have taken too much of this medicine contact a poison control center or emergency room at once.  NOTE: This medicine is only for you. Do not share this medicine with others.  What if I miss a dose?  If you miss a dose, take it as soon as you can. If it is almost time for your next dose, take only that dose. Do not take double or extra doses.  What may interact with this medicine?  Do not take this  medicine with any of the following medications:  -other gabapentin products  This medicine may also interact with the following medications:  -alcohol  -antacids  -antihistamines for allergy, cough and cold  -certain medicines for anxiety or sleep  -certain medicines for depression or psychotic disturbances  -homatropine; hydrocodone  -naproxen  -narcotic medicines (opiates) for pain  -phenothiazines like chlorpromazine, mesoridazine, prochlorperazine, thioridazine  This list may not describe all possible interactions. Give your health care provider a list of all the medicines, herbs, non-prescription drugs, or dietary supplements you use. Also tell them if you smoke, drink alcohol, or use illegal drugs. Some items may interact with your medicine.  What should I watch for while using this medicine?  Visit your doctor or health care professional for regular checks on your progress. You may want to keep a record at home of how you feel your condition is responding to treatment. You may want to share this information with your doctor or health care professional at each visit. You should contact your doctor or health care professional if your seizures get worse or if you have any new types of seizures. Do not stop taking this medicine or any of your seizure medicines unless instructed by your doctor or health care professional. Stopping your medicine suddenly can increase your seizures or their severity.  Wear a medical identification bracelet or chain if you are taking this medicine for seizures, and carry a card that lists all your medications.  You may get drowsy, dizzy, or have blurred vision. Do not drive, use machinery, or do anything that needs mental alertness until you know how this medicine affects you. To reduce dizzy or fainting spells, do not sit or stand up quickly, especially if you are an older patient. Alcohol can increase drowsiness and dizziness. Avoid alcoholic drinks.  Your mouth may get dry.  Chewing sugarless gum or sucking hard candy, and drinking plenty of water will help.  The use of this medicine may increase the chance of suicidal thoughts or actions. Pay special attention to how you are responding while on this medicine. Any worsening of mood, or thoughts of suicide or dying should be reported to your health care professional right away.  Women who become pregnant while using this medicine may enroll in the [place] Antiepileptic Drug Pregnancy Registry by calling 1-446.273.6863. This registry collects information about the safety of antiepileptic drug use during pregnancy.  What side effects may I notice from receiving this medicine?  Side effects that you should report to your doctor or health care professional as soon as possible:  -allergic reactions like skin rash, itching or hives, swelling of the face, lips, or tongue  -worsening of mood, thoughts or actions of suicide or dying  Side effects that usually do not require medical attention (report to your doctor or health care professional if they continue or are bothersome):  -constipation  -difficulty walking or controlling muscle movements  -dizziness  -nausea  -slurred speech  -tiredness  -tremors  -weight gain  This list may not describe all possible side effects. Call your doctor for medical advice about side effects. You may report side effects to FDA at 4-570-FDA-1167.  Where should I keep my medicine?  Keep out of reach of children.  Store at room temperature between 15 and 30 degrees C (59 and 86 degrees F). Throw away any unused medicine after the expiration date.  NOTE: This sheet is a summary. It may not cover all possible information. If you have questions about this medicine, talk to your doctor, pharmacist, or health care provider.  © 2014, [employer]. (1/9/2013 11:43:23 AM)

## 2017-11-06 NOTE — PROGRESS NOTES
Subjective:   Andrez Sanchez is a 47 y.o. male here today for Pain in both lower extremities    Pain in both lower extremities  At night, it hurts     HPI :    Patient is here with complaint of pain in both lower legs for at least one month. Recently has been getting worse. Describing as a burning type of pain with pins and needles sensation in both feet that goes up from his feet to his legs. Denies any weakness. Reports occasional numbness on the dorsal surface of the feet. His diabetes is poorly controlled with a recent A1c of 11.7. Pain is worse at night. He has not tried any medications for the pain.    Denies chest pain, shortness of breath, claudication, swelling in the legs.    Current medicines (including changes today)  Current Outpatient Prescriptions   Medication Sig Dispense Refill   • gabapentin (NEURONTIN) 300 MG Cap Take 1 Cap by mouth 2 Times a Day. 60 Cap 3   • metformin (GLUCOPHAGE) 1000 MG tablet TAKE 1 TABLET TWICE A DAY WITH MEALS 180 Tab 3   • metoprolol SR (TOPROL XL) 100 MG TABLET SR 24 HR TAKE 1 TABLET DAILY 90 Tab 3   • valsartan-hydrochlorothiazide (DIOVAN-HCT) 320-25 MG per tablet TAKE 1 TABLET DAILY 90 Tab 3   • amlodipine (NORVASC) 5 MG Tab TAKE 1 TABLET DAILY 90 Tab 3   • atorvastatin (LIPITOR) 80 MG tablet TAKE 1 TABLET DAILY IN THE EVENING 90 Tab 3   • glipiZIDE (GLUCOTROL) 5 MG Tab Take 1 Tab by mouth 2 times a day. 180 Tab 3   • vitamin D3, cholecalciferol, 1000 UNIT Tab Take 3 Tabs by mouth every day. 100 Tab 3   • niacin 500 MG Tab Take 1 Tab by mouth every day. 90 Tab 3   • aspirin (ASA) 81 MG Chew Tab chewable tablet Take 1 Tab by mouth every day. 100 Tab 11   • sildenafil citrate (VIAGRA) 100 MG tablet Take 1 Tab by mouth as needed for Erectile Dysfunction. 10 Tab 3   • Blood Glucose Monitoring Suppl SUPPLIES MISC Test strips order: Test strips for One Touch Ultra Mini meter. Sig: use daily and prn ssx high or low sugar #100 RF x 3 100 Strip 3     No current  "facility-administered medications for this visit.      He  has a past medical history of Diabetes (CMS-HCC); Hyperlipidemia; Hypertension; and Obesity.    ROS   No chest pain, no shortness of breath, no abdominal pain       Objective:     Blood pressure 122/78, pulse (!) 104, height 1.778 m (5' 10\"), weight 106.1 kg (234 lb), SpO2 96 %. Body mass index is 33.58 kg/m².     PHYSICAL EXAM     GEN: Alert and oriented,well appearing, no acute distress  SKIN: warm, dry to touch, no rashes or lesions in visible areas  PSYCH: mood and affect normal, judgement normal  EYES: Conjunctiva clear, lids normal,pupils equal round and reactive  ENMT: Normal external nose and ears,EACs normal appearing, TM pearly gray with normal light reflex bilaterally,nasal mucosa and turbinates normal appearing without erythema or edema, lips without lesions,good dentition,oropharynx clear  Neck : Trachea midline, no masses or swelling, no thyromegaly  LYMPHATIC : No cervical or supraclavicular lymphadenopathy  RESPIRATORY : Unlabored respiratory effort, no distress noted, clear to auscultation bilaterally, no wheeze, rhonchi, crackles  CARDIOVASCULAR: RRR, S1 S2 normal, no murmurs   MUSCULOSKELETAL : Normal gait and stance, no obvious abnormalities   NEURO: No overt focal neurologic deficits,sensation intact  EXT : Monofilament examination shows intact sensation in 6 over 6 areas tested bilaterally.   Visual examination revels no lesions, ulcers. Pulses 2+ and symmetrical -DP and PT. Good capillary refill, less than 2 seconds        Assessment and Plan:   The following treatment plan was discussed    1. Diabetes mellitus without complication (CMS-HCC)  Poorly controlled. A1c 11.7. Continue current medications. Follow up with Dr. John.    2. Pain in both lower extremities  New problem  Most likely cause diabetic neuropathy. Will try gabapentin 300 mg twice daily. Discussed possible side effects of medication. Also discussed the importance of " improving diabetes control.  - gabapentin (NEURONTIN) 300 MG Cap; Take 1 Cap by mouth 2 Times a Day.  Dispense: 60 Cap; Refill: 3      Followup: Return in about 3 months (around 2/6/2018) for with PCP for diabetes.         Please note that this dictation was created using voice recognition software. I have made every reasonable attempt to correct obvious errors, but I expect that there are errors of grammar and possibly content that I did not discover before finalizing the note.

## 2018-01-03 ENCOUNTER — OFFICE VISIT (OUTPATIENT)
Dept: MEDICAL GROUP | Facility: PHYSICIAN GROUP | Age: 48
End: 2018-01-03
Payer: COMMERCIAL

## 2018-01-03 ENCOUNTER — HOSPITAL ENCOUNTER (OUTPATIENT)
Dept: LAB | Facility: MEDICAL CENTER | Age: 48
End: 2018-01-03
Attending: FAMILY MEDICINE
Payer: COMMERCIAL

## 2018-01-03 VITALS
BODY MASS INDEX: 32.07 KG/M2 | TEMPERATURE: 96.1 F | RESPIRATION RATE: 16 BRPM | SYSTOLIC BLOOD PRESSURE: 132 MMHG | DIASTOLIC BLOOD PRESSURE: 72 MMHG | HEIGHT: 70 IN | WEIGHT: 224 LBS | HEART RATE: 89 BPM | OXYGEN SATURATION: 93 %

## 2018-01-03 DIAGNOSIS — M79.604 PAIN IN BOTH LOWER EXTREMITIES: ICD-10-CM

## 2018-01-03 DIAGNOSIS — E78.2 MIXED HYPERLIPIDEMIA: ICD-10-CM

## 2018-01-03 DIAGNOSIS — I10 ESSENTIAL HYPERTENSION, BENIGN: ICD-10-CM

## 2018-01-03 DIAGNOSIS — E66.9 OBESITY (BMI 30-39.9): ICD-10-CM

## 2018-01-03 DIAGNOSIS — E11.9 DIABETES MELLITUS WITHOUT COMPLICATION (HCC): ICD-10-CM

## 2018-01-03 DIAGNOSIS — M79.605 PAIN IN BOTH LOWER EXTREMITIES: ICD-10-CM

## 2018-01-03 LAB
ALBUMIN SERPL BCP-MCNC: 4.3 G/DL (ref 3.2–4.9)
ALBUMIN/GLOB SERPL: 1.7 G/DL
ALP SERPL-CCNC: 75 U/L (ref 30–99)
ALT SERPL-CCNC: 28 U/L (ref 2–50)
ANION GAP SERPL CALC-SCNC: 11 MMOL/L (ref 0–11.9)
AST SERPL-CCNC: 15 U/L (ref 12–45)
BASOPHILS # BLD AUTO: 0.8 % (ref 0–1.8)
BASOPHILS # BLD: 0.05 K/UL (ref 0–0.12)
BILIRUB SERPL-MCNC: 1.9 MG/DL (ref 0.1–1.5)
BUN SERPL-MCNC: 16 MG/DL (ref 8–22)
CALCIUM SERPL-MCNC: 9.7 MG/DL (ref 8.5–10.5)
CHLORIDE SERPL-SCNC: 97 MMOL/L (ref 96–112)
CHOLEST SERPL-MCNC: 107 MG/DL (ref 100–199)
CO2 SERPL-SCNC: 29 MMOL/L (ref 20–33)
CREAT SERPL-MCNC: 0.99 MG/DL (ref 0.5–1.4)
CREAT UR-MCNC: 181.2 MG/DL
EOSINOPHIL # BLD AUTO: 0.13 K/UL (ref 0–0.51)
EOSINOPHIL NFR BLD: 2.1 % (ref 0–6.9)
ERYTHROCYTE [DISTWIDTH] IN BLOOD BY AUTOMATED COUNT: 37.2 FL (ref 35.9–50)
EST. AVERAGE GLUCOSE BLD GHB EST-MCNC: 301 MG/DL
GFR SERPL CREATININE-BSD FRML MDRD: >60 ML/MIN/1.73 M 2
GLOBULIN SER CALC-MCNC: 2.5 G/DL (ref 1.9–3.5)
GLUCOSE SERPL-MCNC: 388 MG/DL (ref 65–99)
HBA1C MFR BLD: 12.1 % (ref 0–5.6)
HCT VFR BLD AUTO: 44.5 % (ref 42–52)
HDLC SERPL-MCNC: 40 MG/DL
HGB BLD-MCNC: 15.9 G/DL (ref 14–18)
IMM GRANULOCYTES # BLD AUTO: 0.02 K/UL (ref 0–0.11)
IMM GRANULOCYTES NFR BLD AUTO: 0.3 % (ref 0–0.9)
LDLC SERPL CALC-MCNC: 42 MG/DL
LYMPHOCYTES # BLD AUTO: 1.76 K/UL (ref 1–4.8)
LYMPHOCYTES NFR BLD: 28.4 % (ref 22–41)
MCH RBC QN AUTO: 31.2 PG (ref 27–33)
MCHC RBC AUTO-ENTMCNC: 35.7 G/DL (ref 33.7–35.3)
MCV RBC AUTO: 87.4 FL (ref 81.4–97.8)
MICROALBUMIN UR-MCNC: 1.6 MG/DL
MICROALBUMIN/CREAT UR: 9 MG/G (ref 0–30)
MONOCYTES # BLD AUTO: 0.57 K/UL (ref 0–0.85)
MONOCYTES NFR BLD AUTO: 9.2 % (ref 0–13.4)
NEUTROPHILS # BLD AUTO: 3.66 K/UL (ref 1.82–7.42)
NEUTROPHILS NFR BLD: 59.2 % (ref 44–72)
NRBC # BLD AUTO: 0 K/UL
NRBC BLD-RTO: 0 /100 WBC
PLATELET # BLD AUTO: 261 K/UL (ref 164–446)
PMV BLD AUTO: 10.3 FL (ref 9–12.9)
POTASSIUM SERPL-SCNC: 4.3 MMOL/L (ref 3.6–5.5)
PROT SERPL-MCNC: 6.8 G/DL (ref 6–8.2)
RBC # BLD AUTO: 5.09 M/UL (ref 4.7–6.1)
SODIUM SERPL-SCNC: 137 MMOL/L (ref 135–145)
TRIGL SERPL-MCNC: 126 MG/DL (ref 0–149)
WBC # BLD AUTO: 6.2 K/UL (ref 4.8–10.8)

## 2018-01-03 PROCEDURE — 82570 ASSAY OF URINE CREATININE: CPT

## 2018-01-03 PROCEDURE — 80053 COMPREHEN METABOLIC PANEL: CPT

## 2018-01-03 PROCEDURE — 82043 UR ALBUMIN QUANTITATIVE: CPT

## 2018-01-03 PROCEDURE — 80061 LIPID PANEL: CPT

## 2018-01-03 PROCEDURE — 36415 COLL VENOUS BLD VENIPUNCTURE: CPT

## 2018-01-03 PROCEDURE — 83036 HEMOGLOBIN GLYCOSYLATED A1C: CPT

## 2018-01-03 PROCEDURE — 85025 COMPLETE CBC W/AUTO DIFF WBC: CPT

## 2018-01-03 PROCEDURE — 99214 OFFICE O/P EST MOD 30 MIN: CPT | Performed by: FAMILY MEDICINE

## 2018-01-03 RX ORDER — GABAPENTIN 300 MG/1
CAPSULE ORAL
Qty: 270 CAP | Refills: 3 | OUTPATIENT
Start: 2018-01-03 | End: 2018-01-29 | Stop reason: SDUPTHER

## 2018-01-03 RX ORDER — GLIPIZIDE 5 MG/1
5 TABLET ORAL 2 TIMES DAILY
Qty: 60 TAB | Refills: 3 | Status: SHIPPED | OUTPATIENT
Start: 2018-01-03 | End: 2018-01-29 | Stop reason: SDUPTHER

## 2018-01-03 RX ORDER — GLIPIZIDE 5 MG/1
5 TABLET ORAL 2 TIMES DAILY
Qty: 180 TAB | Refills: 3 | OUTPATIENT
Start: 2018-01-03 | End: 2018-01-03 | Stop reason: SDUPTHER

## 2018-01-03 NOTE — PROGRESS NOTES
Patient comes in to recheck his diabetes. Gabapentin 300 mg by mouth twice a day has helped his pain and burning in his feet. He wonders about increasing the dose. He has successfully lost 46 pounds with diet and exercise. He is due for lab work. He is feeling better and better.      I reviewed the following    Past Medical History:   Diagnosis Date   • Diabetes (CMS-HCC)    • Hyperlipidemia    • Hypertension    • Obesity         Past Surgical History:   Procedure Laterality Date   • UVULOPHARYNGOPALATOPLASTY  2006    uvulectomy for sleep apnea   • AV FISTULA REVISION  1973    Repair Congenital Chest AV Fistula       No Known Allergies    Current Outpatient Prescriptions   Medication Sig Dispense Refill   • gabapentin (NEURONTIN) 300 MG Cap Take 1 pill in the AM and 2 pills at night 270 Cap 3   • glipiZIDE (GLUCOTROL) 5 MG Tab Take 1 Tab by mouth 2 times a day. 60 Tab 3   • metformin (GLUCOPHAGE) 1000 MG tablet TAKE 1 TABLET TWICE A DAY WITH MEALS 180 Tab 3   • metoprolol SR (TOPROL XL) 100 MG TABLET SR 24 HR TAKE 1 TABLET DAILY 90 Tab 3   • valsartan-hydrochlorothiazide (DIOVAN-HCT) 320-25 MG per tablet TAKE 1 TABLET DAILY 90 Tab 3   • amlodipine (NORVASC) 5 MG Tab TAKE 1 TABLET DAILY 90 Tab 3   • atorvastatin (LIPITOR) 80 MG tablet TAKE 1 TABLET DAILY IN THE EVENING 90 Tab 3   • vitamin D3, cholecalciferol, 1000 UNIT Tab Take 3 Tabs by mouth every day. 100 Tab 3   • niacin 500 MG Tab Take 1 Tab by mouth every day. 90 Tab 3   • aspirin (ASA) 81 MG Chew Tab chewable tablet Take 1 Tab by mouth every day. 100 Tab 11   • sildenafil citrate (VIAGRA) 100 MG tablet Take 1 Tab by mouth as needed for Erectile Dysfunction. 10 Tab 3   • Blood Glucose Monitoring Suppl SUPPLIES MISC Test strips order: Test strips for One Touch Ultra Mini meter. Sig: use daily and prn ssx high or low sugar #100 RF x 3 100 Strip 3     No current facility-administered medications for this visit.         Family History   Problem Relation Age of  Onset   • Cancer Mother    • Diabetes Paternal Grandmother        Social History     Social History   • Marital status:      Spouse name: N/A   • Number of children: N/A   • Years of education: N/A     Occupational History   • Not on file.     Social History Main Topics   • Smoking status: Never Smoker   • Smokeless tobacco: Never Used   • Alcohol use Yes      Comment: rare   • Drug use: No   • Sexual activity: Yes     Partners: Female     Birth control/ protection: Condom      Comment: wife had double mastectomy for breast cancer     Other Topics Concern   • Not on file     Social History Narrative   • No narrative on file        Physical Exam   Constitutional: He is oriented. He appears well-developed and well-nourished. No distress.   HENT:   Head: Normocephalic and atraumatic.   Right Ear: External ear normal. Ear canal and TM normal   Left Ear: External ear normal. Ear canal and TM normal   Nose: Nose normal.   Mouth/Throat: Oropharynx is clear and moist.   Eyes: Conjunctivae and extraocular motions are normal. Pupils are equal, round, and reactive to light.        Fundi benign bilaterally   Neck: No thyromegaly present.   Cardiovascular: Normal rate, regular rhythm, normal heart sounds and intact distal pulses.  Exam reveals no gallop.    No murmur heard.  Pulmonary/Chest: Effort normal and breath sounds normal. No respiratory distress. He has no wheezes. He has no rales.   Abdominal: Soft. Bowel sounds are normal. No hepatosplenomegaly. He exhibits no distension. No tenderness. He has no rebound and no guarding.   Musculoskeletal: Normal range of motion. He exhibits no edema and no tenderness.   Lymphadenopathy:     He has no cervical adenopathy.  No supraclavicular adenopathy.   Neurological: He is alert and oriented. He has normal reflexes.        Babinskis downgoing bilaterally --The patient has intact sensation to monofilament light touch over both feet. No sores or ulcers are noted over the  feet.    Skin: Skin is warm and dry. No rash noted. No erythema.   Psychiatric: He has a normal mood and appropriate affect. His behavior is normal. Judgment and thought content normal.     1. Diabetes mellitus without complication (CMS-HCC) --I would expect that his control is improving since he has lost weight.  glipiZIDE (GLUCOTROL) 5 MG Tab--One by mouth twice a day with food. We may have to decrease this dose because of his successful weight loss, however.     COMP METABOLIC PANEL    LIPID PROFILE    MICROALBUMIN CREAT RATIO URINE    HEMOGLOBIN A1C    Diabetic Monofilament LE Exam       2. Mixed hyperlipidemia  COMP METABOLIC PANEL    LIPID PROFILE   3. Pain in both lower extremities--improving with gabapentin  gabapentin (NEURONTIN) 300 MG Cap--Increase dose to 1 pill in the morning and 2 pills at night    4. Essential hypertension, benign --Doing well  CBC WITH DIFFERENTIAL    COMP METABOLIC PANEL    LIPID PROFILE   5. Obesity (BMI 30-39.9)  Patient identified as having weight management issue.  Appropriate orders and counseling given.     Please note that this dictation was created using voice recognition software. I have worked with consultants from the vendor as well as technical experts from [employer] to optimize the interface. I have made every reasonable attempt to correct obvious errors, but I expect that there are errors of grammar and possibly content that I did not discover before finalizing the note.    Recheck 2 months or when necessary

## 2018-01-03 NOTE — LETTER
January 3, 2018     Andrez Yarbrough Daniel  [address]  [place] [address]      Dear Andrez:    Thank you for enrolling in [employer]. Please follow the instructions below to securely access your online medical record. [employer] allows you to send messages to your healthcare team, view certain test results, renew your prescriptions, schedule appointments, and more.     How Do I Sign Up?  1. In your Internet browser, go to  [email or web address]  2. Click on the Sign Up Now link in the Sign In box. You will see the New Member Sign Up page.  3. Enter your [employer] Access Code exactly as it appears below (case sensitive). You will not need to use this code after you’ve completed the sign-up process. If you do not sign up before the expiration date, you must request a new code.  [employer] Access Code: Activation code not generated  Current [employer] Status: Patient Declined    4. Enter your Email address and Date of Birth (mm/dd/yyyy) as indicated and click Submit. You will be taken to the next sign-up page.  5. Create a [employer] ID (case sensitive) . This will be your [employer] login ID and cannot be changed, so think of one that is secure and easy to remember.  6. Create a [employer] password  (case sensitive).   · Your password must be a length of at least 6 characters/digits.  · It must include at least 1 numeric.  · You can change your password at any time.  7. Enter your Password Reset Question and Answer. This can be used at a later time if you forget your password.   8. Enter your e-mail address. You will receive e-mail notification when new information is available in [employer].  9. Click Sign Up. You can now view your medical record.     Additional Information  Please contact [employer] Customer Support at [phone] for any questions . Remember, [employer] is NOT to be used for urgent needs. For medical emergencies, dial 911.          Introducing [employer]    [facility]’s Secure, Online Health Connection      [facility] now offers convenient, online access to your healthcare team and personal health information.  [employer] makes managing your healthcare easier than ever, and allows you to:  • Email your healthcare provider securely and privately  • Access your test results  • Request prescription refills 24 hours a day  • View your personal medical records from home  • Schedule or change your appointments  • View your Insurance and Billing Information  • Pay bills online ? Coming soon!        Sign below to get started:  I hereby request access to [employer] application.  I agree to abide by the [employer] Terms and Conditions, which will be provided to me upon activating my account.       __________________________________        _________________________  Name (Please Print)          Date of Birth     __________________________________       _________________________  Signature          Primary Care Provider      _______________  Date                          *For Internal Use Only: Please scan this form into the patient’s chart. Click on  - Select Patient - Attach to Encounter:  - Document Type: Consent   - Document Description: MyChart Consent

## 2018-01-08 DIAGNOSIS — E11.9 DIABETES MELLITUS WITHOUT COMPLICATION (HCC): ICD-10-CM

## 2018-01-29 DIAGNOSIS — M79.604 PAIN IN BOTH LOWER EXTREMITIES: ICD-10-CM

## 2018-01-29 DIAGNOSIS — M79.605 PAIN IN BOTH LOWER EXTREMITIES: ICD-10-CM

## 2018-01-29 DIAGNOSIS — E11.9 DIABETES MELLITUS WITHOUT COMPLICATION (HCC): ICD-10-CM

## 2018-01-29 RX ORDER — GLIPIZIDE 5 MG/1
5 TABLET ORAL 2 TIMES DAILY
Qty: 180 TAB | Refills: 3 | Status: SHIPPED | OUTPATIENT
Start: 2018-01-29 | End: 2018-05-03

## 2018-01-29 RX ORDER — GABAPENTIN 300 MG/1
CAPSULE ORAL
Qty: 540 CAP | Refills: 3 | Status: SHIPPED | OUTPATIENT
Start: 2018-01-29 | End: 2018-01-30 | Stop reason: SDUPTHER

## 2018-01-30 RX ORDER — GABAPENTIN 300 MG/1
CAPSULE ORAL
Qty: 540 CAP | Refills: 3 | Status: SHIPPED | OUTPATIENT
Start: 2018-01-30 | End: 2021-01-28

## 2018-01-31 ENCOUNTER — OFFICE VISIT (OUTPATIENT)
Dept: ENDOCRINOLOGY | Facility: MEDICAL CENTER | Age: 48
End: 2018-01-31
Payer: COMMERCIAL

## 2018-01-31 ENCOUNTER — HOSPITAL ENCOUNTER (OUTPATIENT)
Dept: LAB | Facility: MEDICAL CENTER | Age: 48
End: 2018-01-31
Attending: INTERNAL MEDICINE
Payer: COMMERCIAL

## 2018-01-31 VITALS
WEIGHT: 236.5 LBS | SYSTOLIC BLOOD PRESSURE: 123 MMHG | OXYGEN SATURATION: 95 % | HEART RATE: 91 BPM | BODY MASS INDEX: 33.86 KG/M2 | HEIGHT: 70 IN | DIASTOLIC BLOOD PRESSURE: 90 MMHG

## 2018-01-31 DIAGNOSIS — E11.65 TYPE 2 DIABETES MELLITUS WITH HYPERGLYCEMIA, WITHOUT LONG-TERM CURRENT USE OF INSULIN (HCC): ICD-10-CM

## 2018-01-31 DIAGNOSIS — I10 ESSENTIAL HYPERTENSION: ICD-10-CM

## 2018-01-31 DIAGNOSIS — E78.2 MIXED HYPERLIPIDEMIA: ICD-10-CM

## 2018-01-31 LAB
ANION GAP SERPL CALC-SCNC: 9 MMOL/L (ref 0–11.9)
BUN SERPL-MCNC: 13 MG/DL (ref 8–22)
CALCIUM SERPL-MCNC: 9.7 MG/DL (ref 8.5–10.5)
CHLORIDE SERPL-SCNC: 102 MMOL/L (ref 96–112)
CO2 SERPL-SCNC: 30 MMOL/L (ref 20–33)
CREAT SERPL-MCNC: 0.9 MG/DL (ref 0.5–1.4)
GLUCOSE SERPL-MCNC: 199 MG/DL (ref 65–99)
POTASSIUM SERPL-SCNC: 3.9 MMOL/L (ref 3.6–5.5)
SODIUM SERPL-SCNC: 141 MMOL/L (ref 135–145)

## 2018-01-31 PROCEDURE — 36415 COLL VENOUS BLD VENIPUNCTURE: CPT

## 2018-01-31 PROCEDURE — 84681 ASSAY OF C-PEPTIDE: CPT

## 2018-01-31 PROCEDURE — 83516 IMMUNOASSAY NONANTIBODY: CPT

## 2018-01-31 PROCEDURE — 86341 ISLET CELL ANTIBODY: CPT

## 2018-01-31 PROCEDURE — 99204 OFFICE O/P NEW MOD 45 MIN: CPT | Performed by: INTERNAL MEDICINE

## 2018-01-31 PROCEDURE — 80048 BASIC METABOLIC PNL TOTAL CA: CPT

## 2018-01-31 NOTE — PROGRESS NOTES
"New Patient Consult Note  Referred by: Familia John M.D.    Reason for consult: Diabetes management    HPI:  Andrez Sanchez is a 47 y.o. old patient who comes in today to establish care for diabetes. He was initially diagnosed with \"borderline\"diabetes several years ago, and about a year ago he was told he has type 2 diabetes. He has been on metformin for about a year, and   glipizide was started about a month ago. He checks blood sugars a few times a week, most readings are in 200s. No hypoglycemia. He reports compliance with medications. He has lost 75 pounds over the past couple of years. No family history of type 1 diabetes. He has family history of type 2 diabetes. He has some burning pain in feet, symptoms are controlled on gabapentin. He is up-to-date with eye exam.    ROS:  Constitutional: Positive for weight loss  Cardiac: No palpitations or racing heart  Resp: No shortness of breath  Neuro: Positive for burning pain in feet  All other systems were reviewed and were negative.    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    Social History:  Social History     Social History   • Marital status:      Spouse name: N/A   • Number of " children: N/A   • Years of education: N/A     Occupational History   • Not on file.     Social History Main Topics   • Smoking status: Never Smoker   • Smokeless tobacco: Never Used   • Alcohol use Yes      Comment: rare   • Drug use: No   • Sexual activity: Yes     Partners: Female     Birth control/ protection: Condom      Comment: wife had double mastectomy for breast cancer     Other Topics Concern   • Not on file     Social History Narrative   • No narrative on file       Family History:  Family History   Problem Relation Age of Onset   • Cancer Mother    • Diabetes Paternal Grandmother        Medications:    Current Outpatient Prescriptions:   •  glipiZIDE (GLUCOTROL) 5 MG Tab, Take 1 Tab by mouth 2 times a day., Disp: 180 Tab, Rfl: 3  •  metformin (GLUCOPHAGE) 1000 MG tablet, TAKE 1 TABLET TWICE A DAY WITH MEALS, Disp: 180 Tab, Rfl: 3  •  gabapentin (NEURONTIN) 300 MG Cap, Take 4 pills in the AM and 2 pills at night, Disp: 540 Cap, Rfl: 3  •  sildenafil citrate (VIAGRA) 100 MG tablet, Take 1 Tab by mouth as needed for Erectile Dysfunction., Disp: 10 Tab, Rfl: 3  •  metoprolol SR (TOPROL XL) 100 MG TABLET SR 24 HR, TAKE 1 TABLET DAILY, Disp: 90 Tab, Rfl: 3  •  valsartan-hydrochlorothiazide (DIOVAN-HCT) 320-25 MG per tablet, TAKE 1 TABLET DAILY, Disp: 90 Tab, Rfl: 3  •  amlodipine (NORVASC) 5 MG Tab, TAKE 1 TABLET DAILY, Disp: 90 Tab, Rfl: 3  •  atorvastatin (LIPITOR) 80 MG tablet, TAKE 1 TABLET DAILY IN THE EVENING, Disp: 90 Tab, Rfl: 3  •  vitamin D3, cholecalciferol, 1000 UNIT Tab, Take 3 Tabs by mouth every day., Disp: 100 Tab, Rfl: 3  •  niacin 500 MG Tab, Take 1 Tab by mouth every day., Disp: 90 Tab, Rfl: 3  •  aspirin (ASA) 81 MG Chew Tab chewable tablet, Take 1 Tab by mouth every day., Disp: 100 Tab, Rfl: 11  •  Blood Glucose Monitoring Suppl SUPPLIES MISC, Test strips order: Test strips for One Touch Ultra Mini meter. Sig: use daily and prn ssx high or low sugar #100 RF x 3, Disp: 100 Strip, Rfl:  "3    Physical Examination:  Vital signs: /90   Pulse 91   Ht 1.778 m (5' 10\")   Wt 107.3 kg (236 lb 8 oz)   SpO2 95%   BMI 33.93 kg/m²   General: No apparent distress, cooperative  Eyes: No scleral icterus or discharge  ENMT: Normal on external inspection of nose, lips  Neck: No abnormal masses on inspection  Resp: Normal effort, clear to auscultation bilaterally   CVS: Regular rate and rhythm, S1 S2 normal, no murmur   Extremities: No edema  Neuro: Alert and oriented  Skin: No rash  Psych: Normal mood and affect    Assessment and Plan:    1. Type 2 diabetes mellitus with hyperglycemia, without long-term current use of insulin (CMS-[facility])  · He had rather rapid onset of diabetes, he has lost 75 pounds of weight and glycemic control actually worsened, and that raises suspicion for type 1 diabetes as well so we will check C-peptide, OMKAR 65 Ab and IA-2 antibody  · If lab workup is consistent with type 2 diabetes we discussed that I will send prescriptions for Jardiance and Trulicity, we discussed about potential side effects of both Jardiance and Trulicity  - BASIC METABOLIC PANEL; Future  - C-PEPTIDE; Future  - OMKAR-65; Future  - MISCELLANEOUS TEST; Future    2. Essential hypertension  · Blood pressure is well controlled  · Continue ARB    3. Mixed hyperlipidemia  · LDL 42  · Continue Lipitor    Return in about 4 weeks (around 2/28/2018).    Thank you for allowing me to participate in the care of this patient.    Wilian Darnell M.D.  01/31/18    CC:   Familia John M.D.    This note was created using voice recognition software (Dragon). The accuracy of the dictation is limited by the abilities of the software. I have reviewed the note prior to signing, however some errors in grammar and context are still possible. If you have any questions related to this note please do not hesitate to contact our office.     "

## 2018-02-01 LAB
C PEPTIDE SERPL-MCNC: 6.6 NG/ML (ref 0.8–3.5)
PANC ISLET CELL AB TITR SER: NORMAL {TITER}

## 2018-02-02 DIAGNOSIS — Z79.4 TYPE 2 DIABETES MELLITUS WITH HYPERGLYCEMIA, WITH LONG-TERM CURRENT USE OF INSULIN (HCC): ICD-10-CM

## 2018-02-02 DIAGNOSIS — E11.65 TYPE 2 DIABETES MELLITUS WITH HYPERGLYCEMIA, WITH LONG-TERM CURRENT USE OF INSULIN (HCC): ICD-10-CM

## 2018-02-02 LAB — GAD65 AB SER IA-ACNC: <5 IU/ML (ref 0–5)

## 2018-02-07 DIAGNOSIS — Z79.4 TYPE 2 DIABETES MELLITUS WITH HYPERGLYCEMIA, WITH LONG-TERM CURRENT USE OF INSULIN (HCC): ICD-10-CM

## 2018-02-07 DIAGNOSIS — E11.65 TYPE 2 DIABETES MELLITUS WITH HYPERGLYCEMIA, WITH LONG-TERM CURRENT USE OF INSULIN (HCC): ICD-10-CM

## 2018-02-28 ENCOUNTER — OFFICE VISIT (OUTPATIENT)
Dept: ENDOCRINOLOGY | Facility: MEDICAL CENTER | Age: 48
End: 2018-02-28
Payer: COMMERCIAL

## 2018-02-28 VITALS
HEART RATE: 99 BPM | HEIGHT: 70 IN | BODY MASS INDEX: 34.07 KG/M2 | WEIGHT: 238 LBS | DIASTOLIC BLOOD PRESSURE: 85 MMHG | SYSTOLIC BLOOD PRESSURE: 122 MMHG | OXYGEN SATURATION: 96 %

## 2018-02-28 DIAGNOSIS — E78.2 MIXED HYPERLIPIDEMIA: ICD-10-CM

## 2018-02-28 DIAGNOSIS — I10 ESSENTIAL HYPERTENSION: ICD-10-CM

## 2018-02-28 DIAGNOSIS — E11.65 TYPE 2 DIABETES MELLITUS WITH HYPERGLYCEMIA, WITHOUT LONG-TERM CURRENT USE OF INSULIN (HCC): ICD-10-CM

## 2018-02-28 PROCEDURE — 99214 OFFICE O/P EST MOD 30 MIN: CPT | Performed by: INTERNAL MEDICINE

## 2018-02-28 NOTE — PROGRESS NOTES
"PCP: Fmailia John M.D.    CC: Diabetes     HPI:  Andrez Sanchez is a 47 y.o. old patient who comes in today for follow-up.    Type 2 diabetes: He is currently on metformin 1000 g twice a day, glipizide 5 mg twice a day, Jardiance 10 mg daily and and just received Trulicity week ago. Has not started Trulicity yet. He checks blood sugars one to 2 times a day. Glycemic control is improving. Now most blood sugar readings are in 160s.No hypoglycemia. He has some burning sensation in feet, for which he is on gabapentin. He is up-to-date with eye exam.    Hypertension: Blood pressure is well controlled. He reports compliance with medications.    Hyperlipidemia: He is currently on Crestor, tolerating well.    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 06/29/2016   • Obesity    • Diabetes mellitus without complication (CMS-HCC) 06/07/2016   • Mixed hyperlipidemia 10/07/2014   • Vitamin D deficiency 09/26/2014   • AV fistula (CMS-HCC) 09/16/2014   • Umbilical hernia 09/16/2014   • Essential hypertension, benign 08/04/2014     Physical Examination:  Vital signs: /85   Pulse 99   Ht 1.778 m (5' 10\")   Wt 108 kg (238 lb)   SpO2 96%   BMI 34.15 kg/m²   General: No apparent distress, cooperative  Eyes: No scleral icterus or discharge  ENMT: Normal on external inspection of nose, lips  Neck: No abnormal masses on inspection  Resp: Normal effort, clear to auscultation bilaterally   CVS: Regular rate and rhythm, S1 S2 normal, no murmur   Extremities: No edema  Neuro: Alert and oriented  Skin: No rash  Psych: Normal mood and affect    Assessment and Plan:    1. Type 2 diabetes mellitus with " hyperglycemia, without long-term current use of insulin (CMS-[facility])  · Recent labs showed elevated C-peptide, negative OMKAR 65 and islet cell antibody -- consistent with type 2 diabetes  · Hemoglobin A1c in January was 12.1%  · Goal A1c less than 7%  · Continue Jardiance, metformin, glipizide, Trulicity  · Advised to continue checking blood sugars one to 2 times a day    2. Essential hypertension  · Blood pressure is well controlled  · Continue ARB    3. Mixed hyperlipidemia  · LDL 42  · Continue Lipitor    Return in about 2 months (around 4/28/2018).    Thank you for allowing me to participate in the care of this patient.    Wilian Darnell M.D.    CC:   Familia John M.D.    This note was created using voice recognition software (Dragon). The accuracy of the dictation is limited by the abilities of the software. I have reviewed the note prior to signing, however some errors in grammar and context are still possible. If you have any questions related to this note please do not hesitate to contact our office.

## 2018-03-13 ENCOUNTER — OFFICE VISIT (OUTPATIENT)
Dept: MEDICAL GROUP | Facility: PHYSICIAN GROUP | Age: 48
End: 2018-03-13
Payer: COMMERCIAL

## 2018-03-13 VITALS
TEMPERATURE: 97.9 F | HEIGHT: 70 IN | HEART RATE: 92 BPM | RESPIRATION RATE: 14 BRPM | OXYGEN SATURATION: 96 % | DIASTOLIC BLOOD PRESSURE: 72 MMHG | WEIGHT: 244 LBS | SYSTOLIC BLOOD PRESSURE: 122 MMHG | BODY MASS INDEX: 34.93 KG/M2

## 2018-03-13 DIAGNOSIS — E11.9 DIABETES MELLITUS WITHOUT COMPLICATION (HCC): ICD-10-CM

## 2018-03-13 DIAGNOSIS — E78.2 MIXED HYPERLIPIDEMIA: ICD-10-CM

## 2018-03-13 DIAGNOSIS — E55.9 VITAMIN D DEFICIENCY: ICD-10-CM

## 2018-03-13 DIAGNOSIS — M79.605 PAIN IN BOTH LOWER EXTREMITIES: ICD-10-CM

## 2018-03-13 DIAGNOSIS — M79.604 PAIN IN BOTH LOWER EXTREMITIES: ICD-10-CM

## 2018-03-13 DIAGNOSIS — I10 ESSENTIAL HYPERTENSION, BENIGN: ICD-10-CM

## 2018-03-13 DIAGNOSIS — E66.9 OBESITY (BMI 30-39.9): ICD-10-CM

## 2018-03-13 PROCEDURE — 99214 OFFICE O/P EST MOD 30 MIN: CPT | Performed by: FAMILY MEDICINE

## 2018-03-13 ASSESSMENT — PATIENT HEALTH QUESTIONNAIRE - PHQ9: CLINICAL INTERPRETATION OF PHQ2 SCORE: 0

## 2018-03-13 NOTE — PROGRESS NOTES
Patient comes in. He is working on losing weight. He is seeing the endocrinologist and his blood sugars are now in the 110-140 range. He is feeling better. His vision continues to change.  He has no chest pains or shortness of breath.  He says that gabapentin is helping with his pain in his lower extremities. He is currently taking 2 by mouth twice a day. He wonders if he can take an extra one there the 300 mg strength. I told him that would be fine because the maximum dose of this medicine is 3600 mg and he is only getting 1200 mg in his day.    I reviewed the following    Past Medical History:   Diagnosis Date   • Diabetes (CMS-HCC)    • Hyperlipidemia    • Hypertension    • Obesity         Past Surgical History:   Procedure Laterality Date   • UVULOPHARYNGOPALATOPLASTY  2006    uvulectomy for sleep apnea   • AV FISTULA REVISION  1973    Repair Congenital Chest AV Fistula       No Known Allergies    Current Outpatient Prescriptions   Medication Sig Dispense Refill   • Empagliflozin (JARDIANCE) 10 MG Tab Take 10 mg by mouth every day. 90 Tab 3   • gabapentin (NEURONTIN) 300 MG Cap Take 4 pills in the AM and 2 pills at night 540 Cap 3   • glipiZIDE (GLUCOTROL) 5 MG Tab Take 1 Tab by mouth 2 times a day. 180 Tab 3   • metformin (GLUCOPHAGE) 1000 MG tablet TAKE 1 TABLET TWICE A DAY WITH MEALS 180 Tab 3   • metoprolol SR (TOPROL XL) 100 MG TABLET SR 24 HR TAKE 1 TABLET DAILY 90 Tab 3   • valsartan-hydrochlorothiazide (DIOVAN-HCT) 320-25 MG per tablet TAKE 1 TABLET DAILY 90 Tab 3   • amlodipine (NORVASC) 5 MG Tab TAKE 1 TABLET DAILY 90 Tab 3   • atorvastatin (LIPITOR) 80 MG tablet TAKE 1 TABLET DAILY IN THE EVENING 90 Tab 3   • vitamin D3, cholecalciferol, 1000 UNIT Tab Take 3 Tabs by mouth every day. 100 Tab 3   • niacin 500 MG Tab Take 1 Tab by mouth every day. 90 Tab 3   • aspirin (ASA) 81 MG Chew Tab chewable tablet Take 1 Tab by mouth every day. 100 Tab 11   • Dulaglutide (TRULICITY) 0.75 MG/0.5ML Solution  Pen-injector Inject 0.75 mg as instructed every 7 days. 12 PEN 3   • sildenafil citrate (VIAGRA) 100 MG tablet Take 1 Tab by mouth as needed for Erectile Dysfunction. 10 Tab 3   • Blood Glucose Monitoring Suppl SUPPLIES MISC Test strips order: Test strips for One Touch Ultra Mini meter. Sig: use daily and prn ssx high or low sugar #100 RF x 3 100 Strip 3     No current facility-administered medications for this visit.         Family History   Problem Relation Age of Onset   • Cancer Mother    • Diabetes Paternal Grandmother        Social History     Social History   • Marital status:      Spouse name: N/A   • Number of children: N/A   • Years of education: N/A     Occupational History   • Not on file.     Social History Main Topics   • Smoking status: Never Smoker   • Smokeless tobacco: Never Used   • Alcohol use Yes      Comment: rare   • Drug use: No   • Sexual activity: Yes     Partners: Female     Birth control/ protection: Condom      Comment: wife had double mastectomy for breast cancer     Other Topics Concern   • Not on file     Social History Narrative   • No narrative on file      No visits with results within 1 Month(s) from this visit.   Latest known visit with results is:   Hospital Outpatient Visit on 01/31/2018   Component Date Value   • Sodium 01/31/2018 141    • Potassium 01/31/2018 3.9    • Chloride 01/31/2018 102    • Co2 01/31/2018 30    • Glucose 01/31/2018 199*   • Bun 01/31/2018 13    • Creatinine 01/31/2018 0.90    • Calcium 01/31/2018 9.7    • Anion Gap 01/31/2018 9.0    • C-Peptide 01/31/2018 6.6*   • Jose-65 -Glutamic Acid De* 01/31/2018 <5.0    • Islet Cell Antibody 01/31/2018 <1:4    • GFR If  01/31/2018 >60    • GFR If Non  Ameri* 01/31/2018 >60      Physical Exam   Constitutional: He is oriented. He appears well-developed and obese. No distress.   HENT:   Head: Normocephalic and atraumatic.   Right Ear: External ear normal. Ear canal and TM normal   Left  Ear: External ear normal. Ear canal and TM normal   Nose: Nose normal.   Mouth/Throat: Oropharynx is clear and moist.   Eyes: Conjunctivae and extraocular motions are normal. Pupils are equal, round, and reactive to light.        Fundi benign bilaterally   Neck: No thyromegaly present.   Cardiovascular: Normal rate, regular rhythm, normal heart sounds and intact distal pulses.  Exam reveals no gallop.    No murmur heard.  Pulmonary/Chest: Effort normal and breath sounds normal. No respiratory distress. He has no wheezes. He has no rales.   Abdominal: Soft. Bowel sounds are normal. No hepatosplenomegaly. He exhibits no distension. No tenderness. He has no rebound and no guarding.   Musculoskeletal: Normal range of motion. He exhibits no edema and no tenderness.   Lymphadenopathy:     He has no cervical adenopathy.  No supraclavicular adenopathy.   Neurological: He is alert and oriented. He has normal reflexes.        Babinskis downgoing bilaterally --The patient has intact sensation to monofilament light touch over both feet. No sores or ulcers are noted over the feet.    Skin: Skin is warm and dry. No rash noted. No erythema.   Psychiatric: He has a normal mood and appropriate affect. His behavior is normal. Judgment and thought content normal.     1. Obesity (BMI 30-39.9)   I encouraged the patient to continue working on losing weight    2. Diabetes mellitus without complication (CMS-HCC)  Diabetic Monofilament LE Exam  Continue to see endocrinologist    3. Mixed hyperlipidemia   Doing well    4. Vitamin D deficiency   Doing well    5. Essential hypertension, benign   Controlled.    6. Pain in both lower extremities   See above discussion. Patient could certainly take an extra gabapentin 300 mg as needed for his leg pains.      Recheck with me 4 months or when necessary    Please note that this dictation was created using voice recognition software. I have worked with consultants from the vendor as well as  technical experts from [facility] to optimize the interface. I have made every reasonable attempt to correct obvious errors, but I expect that there are errors of grammar and possibly content that I did not discover before finalizing the note.

## 2018-05-03 ENCOUNTER — APPOINTMENT (OUTPATIENT)
Dept: ENDOCRINOLOGY | Facility: MEDICAL CENTER | Age: 48
End: 2018-05-03
Payer: COMMERCIAL

## 2018-05-03 ENCOUNTER — OFFICE VISIT (OUTPATIENT)
Dept: ENDOCRINOLOGY | Facility: MEDICAL CENTER | Age: 48
End: 2018-05-03
Payer: COMMERCIAL

## 2018-05-03 VITALS
DIASTOLIC BLOOD PRESSURE: 64 MMHG | WEIGHT: 234.6 LBS | HEIGHT: 70 IN | SYSTOLIC BLOOD PRESSURE: 92 MMHG | OXYGEN SATURATION: 97 % | BODY MASS INDEX: 33.58 KG/M2 | HEART RATE: 89 BPM

## 2018-05-03 DIAGNOSIS — E78.2 MIXED HYPERLIPIDEMIA: ICD-10-CM

## 2018-05-03 DIAGNOSIS — E11.9 TYPE 2 DIABETES MELLITUS WITHOUT COMPLICATION, WITHOUT LONG-TERM CURRENT USE OF INSULIN (HCC): ICD-10-CM

## 2018-05-03 DIAGNOSIS — I10 ESSENTIAL HYPERTENSION: ICD-10-CM

## 2018-05-03 LAB
HBA1C MFR BLD: 6 % (ref ?–5.8)
INT CON NEG: NORMAL
INT CON POS: NORMAL

## 2018-05-03 PROCEDURE — 83036 HEMOGLOBIN GLYCOSYLATED A1C: CPT | Performed by: INTERNAL MEDICINE

## 2018-05-03 PROCEDURE — 99214 OFFICE O/P EST MOD 30 MIN: CPT | Performed by: INTERNAL MEDICINE

## 2018-05-03 NOTE — PROGRESS NOTES
RN-CDE Note    Subjective:     Health changes since last visit/interval Hx: None    Medications (including changes made today)  Current Outpatient Prescriptions   Medication Sig Dispense Refill   • Dulaglutide (TRULICITY) 0.75 MG/0.5ML Solution Pen-injector Inject 0.75 mg as instructed every 7 days. 12 PEN 3   • Empagliflozin (JARDIANCE) 10 MG Tab Take 10 mg by mouth every day. 90 Tab 3   • gabapentin (NEURONTIN) 300 MG Cap Take 4 pills in the AM and 2 pills at night 540 Cap 3   • glipiZIDE (GLUCOTROL) 5 MG Tab Take 1 Tab by mouth 2 times a day. (Patient taking differently: Take 5 mg by mouth every day.) 180 Tab 3   • metformin (GLUCOPHAGE) 1000 MG tablet TAKE 1 TABLET TWICE A DAY WITH MEALS (Patient taking differently: taking one time per day) 180 Tab 3   • metoprolol SR (TOPROL XL) 100 MG TABLET SR 24 HR TAKE 1 TABLET DAILY 90 Tab 3   • valsartan-hydrochlorothiazide (DIOVAN-HCT) 320-25 MG per tablet TAKE 1 TABLET DAILY 90 Tab 3   • amlodipine (NORVASC) 5 MG Tab TAKE 1 TABLET DAILY 90 Tab 3   • atorvastatin (LIPITOR) 80 MG tablet TAKE 1 TABLET DAILY IN THE EVENING 90 Tab 3   • niacin 500 MG Tab Take 1 Tab by mouth every day. 90 Tab 3   • aspirin (ASA) 81 MG Chew Tab chewable tablet Take 1 Tab by mouth every day. 100 Tab 11   • sildenafil citrate (VIAGRA) 100 MG tablet Take 1 Tab by mouth as needed for Erectile Dysfunction. 10 Tab 3   • vitamin D3, cholecalciferol, 1000 UNIT Tab Take 3 Tabs by mouth every day. (Patient taking differently: Take 4,000 Units by mouth every day.) 100 Tab 3   • Blood Glucose Monitoring Suppl SUPPLIES MISC Test strips order: Test strips for One Touch Ultra Mini meter. Sig: use daily and prn ssx high or low sugar #100 RF x 3 100 Strip 3     No current facility-administered medications for this visit.        Taking daily ASA: Yes  Taking above medications as prescribed: No, has cut back on the Glipizide and Metformin to only one time per day  Patient Denies side effects of  "medication.    Exercise: no regular exercise, sedentary  Diet: \"healthy\" diet  in general  Has cut back on the amount of snacks he is eating  Patient's body mass index is 33.66 kg/m². Exercise and nutrition counseling were performed at this visit.      Health Maintenance:   Health Maintenance Topics with due status: Overdue       Topic Date Due    IMM HEP B VACCINE 1971    IMM PNEUMOCOCCAL 19-64 (ADULT) MEDIUM RISK SERIES 08/09/1990           DM:   Last A1c:   Lab Results   Component Value Date/Time    HBA1C 6 05/03/2018 10:46 AM      A1c goal: < 7    Glucose monitoring frequency: testing on average 1 time per day      Hypoglycemic episodes: no     Last Retinal Exam: 9/13/17  Daily Foot Exam: yes   Routine Dental Exams: yes    Lab Results   Component Value Date/Time    MICROALBCALC 3.3 09/28/2017 09:16 AM    MALBCRT 9 01/03/2018 10:21 AM    MICROALBUR 1.6 01/03/2018 10:21 AM    MICRALB 3.3 09/28/2017 09:16 AM        ACR Albumin/Creatinine Ratio goal <30       HTN:   Blood pressure goal <140/<80 .   Currently Rx ACE/ARB: Yes    Dyslipidemia:    Lab Results   Component Value Date/Time    CHOLSTRLTOT 107 01/03/2018 10:21 AM    LDL 42 01/03/2018 10:21 AM    HDL 40 01/03/2018 10:21 AM    TRIGLYCERIDE 126 01/03/2018 10:21 AM       Lab Results   Component Value Date/Time    SODIUM 141 01/31/2018 09:05 AM    POTASSIUM 3.9 01/31/2018 09:05 AM    CHLORIDE 102 01/31/2018 09:05 AM    CO2 30 01/31/2018 09:05 AM    GLUCOSE 199 (H) 01/31/2018 09:05 AM    BUN 13 01/31/2018 09:05 AM    CREATININE 0.90 01/31/2018 09:05 AM    CREATININE 1.0 01/18/2008 09:22 AM    BUNCREATRAT 12 09/28/2017 09:16 AM     Lab Results   Component Value Date/Time    ALKPHOSPHAT 75 01/03/2018 10:21 AM    ASTSGOT 15 01/03/2018 10:21 AM    ALTSGPT 28 01/03/2018 10:21 AM    TBILIRUBIN 1.9 (H) 01/03/2018 10:21 AM        Currently Rx Statin: Yes      He  reports that he has never smoked. He has never used smokeless tobacco.    Objective: "     Exam:  Monofilament: not done      Plan:     Discussed All medications, side effects and compliance (discussed carefully)  Annual eye examinations at Ophthalmology  Diabetic diet discussed in detail, written exchange diet given  Foot care discussed and Podiatry visits  Glycohemoglobin and other lab monitoring  Home glucose monitoring emphasized.         Patient educated on:  - Exercise    Recommended medication changes: none

## 2018-05-03 NOTE — PROGRESS NOTES
"PCP: Familia John M.D.    CC: Diabetes     HPI:  Carrol Goss is a 47 y.o. old patient who comes in today for follow-up.    Type 2 diabetes: He is currently on metformin 1000 mg once a day, glipizide 5 mg once a day, Jardiance 10 mg daily and and Trulicity 0.75 mg weekly. He checks blood sugars one to 2 times a day. Glycemic control continues to improve. No hypoglycemia. A1c today in the clinic to 6%. He has some tingling in his left lateral thigh. He is up-to-date with eye exam.    Hypertension: Blood pressure is well controlled. He reports compliance with medications.    Hyperlipidemia: He is currently on Crestor, tolerating well.    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 06/29/2016   • Diabetes mellitus without complication (HCC) 06/07/2016   • Mixed hyperlipidemia 10/07/2014   • Vitamin D deficiency 09/26/2014   • AV fistula (HCC) 09/16/2014   • Umbilical hernia 09/16/2014   • Essential hypertension, benign 08/04/2014     Physical Examination:  Vital signs: BP (!) 92/64   Pulse 89   Ht 1.778 m (5' 10\")   Wt 106.4 kg (234 lb 9.6 oz)   SpO2 97%   BMI 33.66 kg/m²   General: No apparent distress, cooperative  Eyes: No scleral icterus or discharge  ENMT: Normal on external inspection of nose, lips  Neck: No abnormal masses on inspection  Resp: Normal effort, clear to auscultation bilaterally   CVS: Regular rate and rhythm, S1 S2 normal, no murmur   Extremities: No edema  Neuro: Alert and oriented  Skin: No rash  Psych: Normal mood and affect    LABS:  Results for CARROL GOSS (MRN [id number]) as of 5/3/2018 11:07   Ref. Range 6/7/2016 10:26 9/6/2016 10:57 " 3/8/2017 10:43 9/28/2017 09:16 1/3/2018 10:21  5/3/2018 10:46   Glycohemoglobin Latest Units: % 12.5 (H) 12.9 (H) 12.9 (H) 11.7 (H) 12.1 (H)  6     Assessment and Plan:    1. Type 2 diabetes mellitus without complication, without long-term current use of insulin (CMS-[facility])  · Hemoglobin A1c today in the clinic is 6%, down from 12.1% in January  · Discontinued glipizide  · Increased metformin to 1000 mg twice a day  · Increased Trulicity to 1.5 mg weekly  · Increased Jardiance to 25 mg daily  · Advised to continue checking blood sugars one to 2 times a day    2. Essential hypertension  · Blood pressure is well controlled  · Continue ARB    3. Mixed hyperlipidemia  · LDL 42  · Continue Lipitor    Return in about 4 months (around 9/3/2018).    Thank you for allowing me to participate in the care of this patient.    Wilian Darnell M.D.    CC:   Familia John M.D.    This note was created using voice recognition software (Dragon). The accuracy of the dictation is limited by the abilities of the software. I have reviewed the note prior to signing, however some errors in grammar and context are still possible. If you have any questions related to this note please do not hesitate to contact our office.

## 2018-05-06 DIAGNOSIS — M54.16 LEFT LUMBAR RADICULOPATHY: ICD-10-CM

## 2018-05-17 ENCOUNTER — HOSPITAL ENCOUNTER (OUTPATIENT)
Dept: RADIOLOGY | Facility: MEDICAL CENTER | Age: 48
End: 2018-05-17
Attending: FAMILY MEDICINE
Payer: COMMERCIAL

## 2018-05-17 DIAGNOSIS — M54.16 LEFT LUMBAR RADICULOPATHY: ICD-10-CM

## 2018-05-17 PROCEDURE — 72148 MRI LUMBAR SPINE W/O DYE: CPT

## 2018-05-20 DIAGNOSIS — M51.26 RUPTURED LUMBAR DISC: ICD-10-CM

## 2018-07-16 RX ORDER — ATORVASTATIN CALCIUM 80 MG/1
TABLET, FILM COATED ORAL
Qty: 90 TAB | Refills: 3 | Status: SHIPPED | OUTPATIENT
Start: 2018-07-16 | End: 2019-07-14 | Stop reason: SDUPTHER

## 2018-07-18 ENCOUNTER — APPOINTMENT (OUTPATIENT)
Dept: MEDICAL GROUP | Facility: PHYSICIAN GROUP | Age: 48
End: 2018-07-18
Payer: COMMERCIAL

## 2018-08-09 ENCOUNTER — TELEPHONE (OUTPATIENT)
Dept: MEDICAL GROUP | Facility: PHYSICIAN GROUP | Age: 48
End: 2018-08-09

## 2018-08-09 NOTE — TELEPHONE ENCOUNTER
VOICEMAIL: 08/09/18 @ 8:38 am  1. Caller Name: Andrez Sanchez                      Call Back Number: [phone] (home)     2. Message: Pt stated he had constipation last night and it was painful. Pt is wondering if it could be medication related since this is the second time it has happen and would like advise to and help prevent this in the future.    3. Patient approves office to leave a detailed voicemail/MyChart message: yes

## 2018-08-10 NOTE — TELEPHONE ENCOUNTER
Pt spoke with a pharmacist and there suggestion seems to be helping will alk more about it on Monday 08/13/18 at his appointment

## 2018-08-10 NOTE — TELEPHONE ENCOUNTER
I don't see any of his medicines that are obvious causes for constipation. Have him drink plenty of water and use OTC Citrucel or Metamucil daily.  Familia John M.D.

## 2018-09-06 ENCOUNTER — HOSPITAL ENCOUNTER (OUTPATIENT)
Dept: LAB | Facility: MEDICAL CENTER | Age: 48
End: 2018-09-06
Attending: INTERNAL MEDICINE
Payer: COMMERCIAL

## 2018-09-06 ENCOUNTER — OFFICE VISIT (OUTPATIENT)
Dept: ENDOCRINOLOGY | Facility: MEDICAL CENTER | Age: 48
End: 2018-09-06
Payer: COMMERCIAL

## 2018-09-06 VITALS
OXYGEN SATURATION: 95 % | HEIGHT: 70 IN | DIASTOLIC BLOOD PRESSURE: 76 MMHG | HEART RATE: 79 BPM | BODY MASS INDEX: 34.07 KG/M2 | WEIGHT: 238 LBS | SYSTOLIC BLOOD PRESSURE: 110 MMHG

## 2018-09-06 DIAGNOSIS — E55.9 VITAMIN D DEFICIENCY: ICD-10-CM

## 2018-09-06 DIAGNOSIS — E78.2 MIXED HYPERLIPIDEMIA: ICD-10-CM

## 2018-09-06 DIAGNOSIS — E11.9 DIABETES MELLITUS WITHOUT COMPLICATION (HCC): ICD-10-CM

## 2018-09-06 DIAGNOSIS — I10 ESSENTIAL HYPERTENSION: ICD-10-CM

## 2018-09-06 DIAGNOSIS — E11.65 TYPE 2 DIABETES MELLITUS WITH HYPERGLYCEMIA, WITHOUT LONG-TERM CURRENT USE OF INSULIN (HCC): ICD-10-CM

## 2018-09-06 LAB
25(OH)D3 SERPL-MCNC: 50 NG/ML (ref 30–100)
ALBUMIN SERPL BCP-MCNC: 4.4 G/DL (ref 3.2–4.9)
ALBUMIN/GLOB SERPL: 1.5 G/DL
ALP SERPL-CCNC: 63 U/L (ref 30–99)
ALT SERPL-CCNC: 30 U/L (ref 2–50)
ANION GAP SERPL CALC-SCNC: 7 MMOL/L (ref 0–11.9)
AST SERPL-CCNC: 14 U/L (ref 12–45)
BILIRUB SERPL-MCNC: 1.5 MG/DL (ref 0.1–1.5)
BUN SERPL-MCNC: 21 MG/DL (ref 8–22)
CALCIUM SERPL-MCNC: 9.6 MG/DL (ref 8.5–10.5)
CHLORIDE SERPL-SCNC: 104 MMOL/L (ref 96–112)
CHOLEST SERPL-MCNC: 169 MG/DL (ref 100–199)
CO2 SERPL-SCNC: 28 MMOL/L (ref 20–33)
CREAT SERPL-MCNC: 1.17 MG/DL (ref 0.5–1.4)
CREAT UR-MCNC: 147.4 MG/DL
FASTING STATUS PATIENT QL REPORTED: NORMAL
GLOBULIN SER CALC-MCNC: 2.9 G/DL (ref 1.9–3.5)
GLUCOSE SERPL-MCNC: 156 MG/DL (ref 65–99)
HBA1C MFR BLD: 7.5 % (ref ?–5.8)
HDLC SERPL-MCNC: 59 MG/DL
INT CON NEG: NORMAL
INT CON POS: NORMAL
LDLC SERPL CALC-MCNC: 81 MG/DL
MICROALBUMIN UR-MCNC: 0.8 MG/DL
MICROALBUMIN/CREAT UR: 5 MG/G (ref 0–30)
POTASSIUM SERPL-SCNC: 3.9 MMOL/L (ref 3.6–5.5)
PROT SERPL-MCNC: 7.3 G/DL (ref 6–8.2)
SODIUM SERPL-SCNC: 139 MMOL/L (ref 135–145)
TRIGL SERPL-MCNC: 146 MG/DL (ref 0–149)

## 2018-09-06 PROCEDURE — 99214 OFFICE O/P EST MOD 30 MIN: CPT | Performed by: INTERNAL MEDICINE

## 2018-09-06 PROCEDURE — 80053 COMPREHEN METABOLIC PANEL: CPT

## 2018-09-06 PROCEDURE — 36415 COLL VENOUS BLD VENIPUNCTURE: CPT

## 2018-09-06 PROCEDURE — 82306 VITAMIN D 25 HYDROXY: CPT

## 2018-09-06 PROCEDURE — 82043 UR ALBUMIN QUANTITATIVE: CPT

## 2018-09-06 PROCEDURE — 80061 LIPID PANEL: CPT

## 2018-09-06 PROCEDURE — 83036 HEMOGLOBIN GLYCOSYLATED A1C: CPT | Performed by: INTERNAL MEDICINE

## 2018-09-06 PROCEDURE — 82570 ASSAY OF URINE CREATININE: CPT

## 2018-09-06 RX ORDER — CEPHALEXIN 500 MG/1
CAPSULE ORAL
COMMUNITY
Start: 2018-09-04 | End: 2021-01-28

## 2018-09-06 NOTE — PROGRESS NOTES
"PCP: Familia John M.D.    CC: Diabetes     HPI:  Andrez Sanchez is a 47 y.o. old patient who comes in today for follow-up.    Type 2 diabetes: He is currently on metformin 1000 mg twice a day, Jardiance 25 mg daily and and Trulicity 1.5 mg weekly. He checks blood sugars 1 to 2 times a day.  Blood sugars in the last 2 days are higher than usual, many readings close to 160.  No hypoglycemia.  He is up-to-date with eye exam.    Hypertension: Blood pressure is well controlled. He reports compliance with medications.    Hyperlipidemia: He is currently on Crestor, tolerating well.    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 06/29/2016   • Diabetes mellitus without complication ([facility]) 06/07/2016   • Mixed hyperlipidemia 10/07/2014   • Vitamin D deficiency 09/26/2014   • AV fistula ([facility]) 09/16/2014   • Umbilical hernia 09/16/2014   • Essential hypertension, benign 08/04/2014     Physical Examination:  Vital signs: /76   Pulse 79   Ht 1.778 m (5' 10\")   Wt 108 kg (238 lb)   SpO2 95%   BMI 34.15 kg/m²   General: No apparent distress, cooperative  Eyes: No scleral icterus or discharge  ENMT: Normal on external inspection of nose, lips  Neck: No abnormal masses on inspection  Resp: Normal effort, clear to auscultation bilaterally   CVS: Regular rate and rhythm, S1 S2 normal, no murmur   Extremities: No edema  Neuro: Alert and oriented  Skin: No rash  Psych: Normal mood and affect    Assessment and Plan:    1. Type 2 diabetes mellitus with hyperglycemia, without long-term current use of insulin (CMS-[facility])  · Hemoglobin A1c today in the clinic is 7.5%  · Continue metformin 1000 mg twice a " day, Trulicity 1.5 mg weekly and Jardiance 25 mg daily  · We discussed about importance of weight loss  · Advised to continue checking blood sugars 1 to 2 times a day    2. Essential hypertension  · Blood pressure is well controlled  · Continue ARB    3. Mixed hyperlipidemia  · LDL 42  · Continue Lipitor    Return in about 4 months (around 1/6/2019).    Thank you for allowing me to participate in the care of this patient.    Wilian Darnell M.D.    CC:   Familia John M.D.    This note was created using voice recognition software (Dragon). The accuracy of the dictation is limited by the abilities of the software. I have reviewed the note prior to signing, however some errors in grammar and context are still possible. If you have any questions related to this note please do not hesitate to contact our office.

## 2018-09-06 NOTE — PROGRESS NOTES
RN-BILLIE Note    Subjective:     Health changes since last visit/interval Hx: having some pain in back, had epidural injection about 3-4 weeks ago.  Being treated for a tooth infection    Medications (including changes made today)  Current Outpatient Prescriptions   Medication Sig Dispense Refill   • atorvastatin (LIPITOR) 80 MG tablet TAKE 1 TABLET DAILY IN THE EVENING 90 Tab 3   • Empagliflozin (JARDIANCE) 25 MG Tab Take 25 mg by mouth every day. 90 Tab 2   • metformin (GLUCOPHAGE) 1000 MG tablet Take 1 Tab by mouth 2 times a day, with meals. 180 Tab 2   • gabapentin (NEURONTIN) 300 MG Cap Take 4 pills in the AM and 2 pills at night (Patient taking differently: Take 3 pills in the AM 3 pills with lunch and 4 pills at night) 540 Cap 3   • metoprolol SR (TOPROL XL) 100 MG TABLET SR 24 HR TAKE 1 TABLET DAILY 90 Tab 3   • valsartan-hydrochlorothiazide (DIOVAN-HCT) 320-25 MG per tablet TAKE 1 TABLET DAILY 90 Tab 3   • amlodipine (NORVASC) 5 MG Tab TAKE 1 TABLET DAILY 90 Tab 3   • vitamin D3, cholecalciferol, 1000 UNIT Tab Take 3 Tabs by mouth every day. (Patient taking differently: Take 2,000 Units by mouth every day.) 100 Tab 3   • niacin 500 MG Tab Take 1 Tab by mouth every day. 90 Tab 3   • aspirin (ASA) 81 MG Chew Tab chewable tablet Take 325 mg by mouth every day. 100 Tab 11   • Dulaglutide (TRULICITY) 1.5 MG/0.5ML Solution Pen-injector Inject 1.5 mg as instructed every 7 days. 12 PEN 2   • sildenafil citrate (VIAGRA) 100 MG tablet Take 1 Tab by mouth as needed for Erectile Dysfunction. 10 Tab 3   • Blood Glucose Monitoring Suppl SUPPLIES MISC Test strips order: Test strips for One Touch Ultra Mini meter. Sig: use daily and prn ssx high or low sugar #100 RF x 3 100 Strip 3     No current facility-administered medications for this visit.        Taking daily ASA: Yes  Taking above medications as prescribed: yes  SIDE EFFECTS: Patient denies side effects to medications    Exercise: walking on occaision.   Diet:  "\"healthy\" diet  in general  Patient's body mass index is 34.15 kg/m². Exercise and nutrition counseling were performed at this visit.      Health Maintenance:   Health Maintenance Due   Topic Date Due   • IMM HEP B VACCINE (1 of 3 - Risk 3-dose series) 08/09/1990   • IMM PNEUMOCOCCAL 19-64 (ADULT) MEDIUM RISK SERIES (1 of 1 - PPSV23) 08/09/1990   • IMM INFLUENZA (1) 09/01/2018         DM:   Last A1c:   Lab Results   Component Value Date/Time    HBA1C 7.5 09/06/2018 09:48 AM      A1C GOAL: < 7    Glucose monitoring frequency: 3 times a week     Hypoglycemic episodes: no    Last Retinal Exam: on file and up-to-date  Daily Foot Exam: Yes denies problems.   Routine Dental Exams: Yes, on antibiotic due to dental infection    Lab Results   Component Value Date/Time    MICROALBCALC 3.3 09/28/2017 09:16 AM    MALBCRT 9 01/03/2018 10:21 AM    MICROALBUR 1.6 01/03/2018 10:21 AM    MICRALB 3.3 09/28/2017 09:16 AM        ACR Albumin/Creatinine Ratio goal <30     HTN:   Blood pressure goal <140/<80 at goal.   Currently Rx ACE/ARB: No    Dyslipidemia:    Lab Results   Component Value Date/Time    CHOLSTRLTOT 107 01/03/2018 10:21 AM    LDL 42 01/03/2018 10:21 AM    HDL 40 01/03/2018 10:21 AM    TRIGLYCERIDE 126 01/03/2018 10:21 AM       Lab Results   Component Value Date/Time    SODIUM 141 01/31/2018 09:05 AM    POTASSIUM 3.9 01/31/2018 09:05 AM    CHLORIDE 102 01/31/2018 09:05 AM    CO2 30 01/31/2018 09:05 AM    GLUCOSE 199 (H) 01/31/2018 09:05 AM    BUN 13 01/31/2018 09:05 AM    CREATININE 0.90 01/31/2018 09:05 AM    CREATININE 1.0 01/18/2008 09:22 AM    BUNCREATRAT 12 09/28/2017 09:16 AM     Lab Results   Component Value Date/Time    ALKPHOSPHAT 75 01/03/2018 10:21 AM    ASTSGOT 15 01/03/2018 10:21 AM    ALTSGPT 28 01/03/2018 10:21 AM    TBILIRUBIN 1.9 (H) 01/03/2018 10:21 AM        Currently Rx Statin: Yes    He  reports that he has never smoked. He has never used smokeless tobacco.    Objective:     Exam:  Monofilament: not " done    Plan:     Discussed and educated on:   - All medications, side effects and compliance (discussed carefully)  - Annual eye examinations at Ophthalmology  - Foot Care: what to look for when checking feet every day and when to contact HCP  - HbA1C: target and what A1C is  - Home glucose monitoring emphasized  - Weight control and daily exercise    Recommended medication changes: none

## 2018-09-25 RX ORDER — VALSARTAN AND HYDROCHLOROTHIAZIDE 320; 25 MG/1; MG/1
TABLET, FILM COATED ORAL
Qty: 90 TAB | Refills: 3 | Status: SHIPPED | OUTPATIENT
Start: 2018-09-25 | End: 2019-09-22 | Stop reason: SDUPTHER

## 2018-09-25 RX ORDER — METOPROLOL SUCCINATE 100 MG/1
TABLET, EXTENDED RELEASE ORAL
Qty: 90 TAB | Refills: 3 | Status: SHIPPED | OUTPATIENT
Start: 2018-09-25 | End: 2019-09-22 | Stop reason: SDUPTHER

## 2018-09-25 RX ORDER — AMLODIPINE BESYLATE 5 MG/1
TABLET ORAL
Qty: 90 TAB | Refills: 3 | Status: SHIPPED | OUTPATIENT
Start: 2018-09-25 | End: 2019-09-22 | Stop reason: SDUPTHER

## 2018-09-25 NOTE — TELEPHONE ENCOUNTER
Was the patient seen in the last year in this department? Yes LOV 03/13/18    Does patient have an active prescription for medications requested? No     Received Request Via: Pharmacy

## 2018-11-19 DIAGNOSIS — K21.9 GASTROESOPHAGEAL REFLUX DISEASE, ESOPHAGITIS PRESENCE NOT SPECIFIED: ICD-10-CM

## 2019-02-27 ENCOUNTER — OFFICE VISIT (OUTPATIENT)
Dept: ENDOCRINOLOGY | Facility: MEDICAL CENTER | Age: 49
End: 2019-02-27
Payer: COMMERCIAL

## 2019-02-27 VITALS
SYSTOLIC BLOOD PRESSURE: 122 MMHG | DIASTOLIC BLOOD PRESSURE: 85 MMHG | OXYGEN SATURATION: 94 % | HEIGHT: 70 IN | HEART RATE: 106 BPM | WEIGHT: 253 LBS | BODY MASS INDEX: 36.22 KG/M2

## 2019-02-27 DIAGNOSIS — E11.65 UNCONTROLLED TYPE 2 DIABETES MELLITUS WITH HYPERGLYCEMIA (HCC): ICD-10-CM

## 2019-02-27 DIAGNOSIS — I10 ESSENTIAL HYPERTENSION: ICD-10-CM

## 2019-02-27 DIAGNOSIS — E55.9 VITAMIN D DEFICIENCY: ICD-10-CM

## 2019-02-27 DIAGNOSIS — E78.2 MIXED HYPERLIPIDEMIA: ICD-10-CM

## 2019-02-27 PROCEDURE — 99214 OFFICE O/P EST MOD 30 MIN: CPT | Performed by: INTERNAL MEDICINE

## 2019-02-27 NOTE — PROGRESS NOTES
Endocrinology Clinic Progress Note  PCP: Familia John M.D.    HPI:  Andrez Sanchez is a 48 y.o. old patient who comes in today for review of Management of Uncontrolled Type 2 Diabetes.  This patient was previously seen by my colleague Dr. Darnell.  He is new to me.    HPI:  Andrez Sanchez is a 48 y.o. old patient who comes in today for evaluation of above stated problem.    Most Recent HbA1c:   Lab Results   Component Value Date/Time    HBA1C 7.5 09/06/2018 09:48 AM        Current Diabetes Regimen:  SGLT-2 Inhibitor:  Empagliflozin 10 mg once daily     He stopped taking Trulicity 3-4 weeks ago.  The reason for stopping was increase in the flatulence and also increasing the hunger cravings.  However he does report eating fatty foods at all times while on Trulicity which may have made the situation worse.    Before Breakfast: 10 days ago around 160-220 range.  However is not checking his blood sugar that often    ROS:  Constitutional: No weight loss  Cardiac: No palpitations or racing heart  Resp: No shortness of breath  Neuro: No numbness or tinging in feet  Endo: No heat or cold intolerance, no polyuria or polydipsia  All other systems were reviewed and were negative.    Past Medical History:  Patient Active Problem List    Diagnosis Date Noted   • Obesity (BMI 30-39.9) 01/03/2018   • Pain in both lower extremities 11/06/2017   • Erectile dysfunction 03/08/2017   • Nonspecific abnormal electrocardiogram (ECG) (EKG) - possible WPW pattern 06/29/2016   • Diabetes mellitus without complication (HCC) 06/07/2016   • Mixed hyperlipidemia 10/07/2014   • Vitamin D deficiency 09/26/2014   • AV fistula (HCC) 09/16/2014   • Umbilical hernia 09/16/2014   • Essential hypertension, benign 08/04/2014       Past Surgical History:  Past Surgical History:   Procedure Laterality Date   • UVULOPHARYNGOPALATOPLASTY  2006    uvulectomy for sleep apnea   • AV FISTULA REVISION  1973    Repair Congenital Chest AV Fistula        Allergies:  Patient has no known allergies.    Social History:  Social History     Social History   • Marital status:      Spouse name: N/A   • Number of children: N/A   • Years of education: N/A     Occupational History   • Not on file.     Social History Main Topics   • Smoking status: Never Smoker   • Smokeless tobacco: Never Used   • Alcohol use Yes      Comment: rare   • Drug use: No   • Sexual activity: Yes     Partners: Female     Birth control/ protection: Condom      Comment: wife had double mastectomy for breast cancer     Other Topics Concern   • Not on file     Social History Narrative   • No narrative on file       Family History:  Family History   Problem Relation Age of Onset   • Cancer Mother    • Diabetes Paternal Grandmother        Medications:    Current Outpatient Prescriptions:   •  amLODIPine (NORVASC) 5 MG Tab, TAKE 1 TABLET DAILY, Disp: 90 Tab, Rfl: 3  •  valsartan-hydrochlorothiazide (DIOVAN-HCT) 320-25 MG per tablet, TAKE 1 TABLET DAILY, Disp: 90 Tab, Rfl: 3  •  metoprolol SR (TOPROL XL) 100 MG TABLET SR 24 HR, TAKE 1 TABLET DAILY, Disp: 90 Tab, Rfl: 3  •  metformin (GLUCOPHAGE) 1000 MG tablet, TAKE 1 TABLET TWICE A DAY WITH MEALS, Disp: 180 Tab, Rfl: 3  •  atorvastatin (LIPITOR) 80 MG tablet, TAKE 1 TABLET DAILY IN THE EVENING, Disp: 90 Tab, Rfl: 3  •  Empagliflozin (JARDIANCE) 25 MG Tab, Take 25 mg by mouth every day., Disp: 90 Tab, Rfl: 2  •  gabapentin (NEURONTIN) 300 MG Cap, Take 4 pills in the AM and 2 pills at night (Patient taking differently: Take 3 pills in the AM 3 pills with lunch and 4 pills at night), Disp: 540 Cap, Rfl: 3  •  vitamin D3, cholecalciferol, 1000 UNIT Tab, Take 3 Tabs by mouth every day. (Patient taking differently: Take 2,000 Units by mouth every day.), Disp: 100 Tab, Rfl: 3  •  niacin 500 MG Tab, Take 1 Tab by mouth every day., Disp: 90 Tab, Rfl: 3  •  aspirin (ASA) 81 MG Chew Tab chewable tablet, Take 325 mg by mouth every day., Disp: 100 Tab,  "Rfl: 11  •  esomeprazole (NEXIUM) 20 MG capsule, Take 1 Cap by mouth every morning before breakfast., Disp: 30 Cap, Rfl: 0  •  cephALEXin (KEFLEX) 500 MG Cap, , Disp: , Rfl:   •  Dulaglutide (TRULICITY) 1.5 MG/0.5ML Solution Pen-injector, Inject 1.5 mg as instructed every 7 days. (Patient not taking: Reported on 2/27/2019), Disp: 12 PEN, Rfl: 2  •  metformin (GLUCOPHAGE) 1000 MG tablet, Take 1 Tab by mouth 2 times a day, with meals., Disp: 180 Tab, Rfl: 2  •  sildenafil citrate (VIAGRA) 100 MG tablet, Take 1 Tab by mouth as needed for Erectile Dysfunction., Disp: 10 Tab, Rfl: 3  •  Blood Glucose Monitoring Suppl SUPPLIES MISC, Test strips order: Test strips for One Touch Ultra Mini meter. Sig: use daily and prn ssx high or low sugar #100 RF x 3, Disp: 100 Strip, Rfl: 3    Labs: Reviewed    Physical Examination:  Vital signs: /85 (BP Location: Right arm, Patient Position: Sitting)   Pulse (!) 106   Ht 1.778 m (5' 10\")   Wt 114.8 kg (253 lb)   SpO2 94%   BMI 36.30 kg/m²   Body mass index is 36.3 kg/m².  General: No apparent distress, cooperative  Eyes: No scleral icterus or discharge  ENMT: Normal on external inspection of nose, lips, normal thyroid exam  Neck: No abnormal masses on inspection  Resp: Normal effort, clear to auscultation bilaterally   CVS: Regular rate and rhythm, S1 S2 normal, no murmur   Extremities: No edema  Abdomen: abdominal obesity present  Neuro: Alert and oriented  Skin: No rash  Psych: Normal mood and affect, intact memory and able to make informed decisions    Assessment and Plan:    1. Uncontrolled type 2 diabetes mellitus with hyperglycemia (HCC)  Restart Trulicity 1.5 mg weekly again.  Discussed the side effects and benefits in detail.  Discussed the strategies to mitigate the side effects.  Discussed how the medication works.    2. Essential hypertension  Controlled.     3. Mixed hyperlipidemia  Continue statins.     4. Vitamin D deficiency  Cont. Vit D supplementation. "     Return in about 6 weeks (around 4/10/2019).    Thank you for allowing me to participate in the care of this patient.    Memo Young M.D.  02/27/19    CC:   Familia John M.D.    This note was created using voice recognition software (Dragon). The accuracy of the dictation is limited by the abilities of the software. I have reviewed the note prior to signing, however some errors in grammar and context are still possible. If you have any questions related to this note please do not hesitate to contact our office.

## 2019-03-08 ENCOUNTER — PATIENT MESSAGE (OUTPATIENT)
Dept: HEALTH INFORMATION MANAGEMENT | Facility: OTHER | Age: 49
End: 2019-03-08

## 2019-04-02 DIAGNOSIS — E11.9 TYPE 2 DIABETES MELLITUS WITHOUT COMPLICATION, WITHOUT LONG-TERM CURRENT USE OF INSULIN (HCC): ICD-10-CM

## 2019-04-03 DIAGNOSIS — E11.9 TYPE 2 DIABETES MELLITUS WITHOUT COMPLICATION, WITHOUT LONG-TERM CURRENT USE OF INSULIN (HCC): ICD-10-CM

## 2019-04-03 NOTE — TELEPHONE ENCOUNTER
Was the patient seen in the last year in this department? Yes    Does patient have an active prescription for medications requested? Yes    Received Request Via: Pharmacy       [facility] 90 day supply of jardiance has up coming apt on 4/23

## 2019-04-22 NOTE — PROGRESS NOTES
Endocrinology Clinic Progress Note  PCP: Familia John M.D.    HPI:  Andrez Sanchez is a 48 y.o. old patient who comes in today for routine follow up of Management of Uncontrolled Type 2 Diabetes, Hypertension, Mixed Hyperlipidemia, and Vitamin D Deficiency.    HPI:  Andrez Sanchez is a 48 y.o. old patient who comes in today for evaluation of above stated problem.    Most Recent HbA1c:   Lab Results   Component Value Date/Time    HBA1C 7.5 09/06/2018 09:48 AM        Current Diabetes Regimen:  GLP-1 Agent: Dulaglutide 1.5 mg once weekly   SGLT-2 Inhibitor:  Empagliflozin 25 mg once daily   Other: Metformin 1000 mg BID    Before Breakfast:  Before Lunch:  Before Dinner:  Before Bedtime:  Other times:  Hypoglycemia:  {NDKHYPOGLYCEMIA:20516}    ROS:  Constitutional: No weight loss  Cardiac: No palpitations or racing heart  Resp: No shortness of breath  Neuro: No numbness or tinging in feet  Endo: No heat or cold intolerance, no polyuria or polydipsia  All other systems were reviewed and were negative.    Past Medical History:  Patient Active Problem List    Diagnosis Date Noted   • Obesity (BMI 30-39.9) 01/03/2018   • Pain in both lower extremities 11/06/2017   • Erectile dysfunction 03/08/2017   • Nonspecific abnormal electrocardiogram (ECG) (EKG) - possible WPW pattern 06/29/2016   • Diabetes mellitus without complication (HCC) 06/07/2016   • Mixed hyperlipidemia 10/07/2014   • Vitamin D deficiency 09/26/2014   • AV fistula (HCC) 09/16/2014   • Umbilical hernia 09/16/2014   • Essential hypertension, benign 08/04/2014       Past Surgical History:  Past Surgical History:   Procedure Laterality Date   • UVULOPHARYNGOPALATOPLASTY  2006    uvulectomy for sleep apnea   • AV FISTULA REVISION  1973    Repair Congenital Chest AV Fistula       Allergies:  Patient has no known allergies.    Social History:  Social History     Social History   • Marital status:      Spouse name: N/A   • Number of  children: N/A   • Years of education: N/A     Occupational History   • Not on file.     Social History Main Topics   • Smoking status: Never Smoker   • Smokeless tobacco: Never Used   • Alcohol use Yes      Comment: rare   • Drug use: No   • Sexual activity: Yes     Partners: Female     Birth control/ protection: Condom      Comment: wife had double mastectomy for breast cancer     Other Topics Concern   • Not on file     Social History Narrative   • No narrative on file       Family History:  Family History   Problem Relation Age of Onset   • Cancer Mother    • Diabetes Paternal Grandmother        Medications:    Current Outpatient Prescriptions:   •  Empagliflozin (JARDIANCE) 25 MG Tab, Take 25 mg by mouth every day., Disp: 90 Tab, Rfl: 2  •  esomeprazole (NEXIUM) 20 MG capsule, Take 1 Cap by mouth every morning before breakfast., Disp: 30 Cap, Rfl: 0  •  amLODIPine (NORVASC) 5 MG Tab, TAKE 1 TABLET DAILY, Disp: 90 Tab, Rfl: 3  •  valsartan-hydrochlorothiazide (DIOVAN-HCT) 320-25 MG per tablet, TAKE 1 TABLET DAILY, Disp: 90 Tab, Rfl: 3  •  metoprolol SR (TOPROL XL) 100 MG TABLET SR 24 HR, TAKE 1 TABLET DAILY, Disp: 90 Tab, Rfl: 3  •  metformin (GLUCOPHAGE) 1000 MG tablet, TAKE 1 TABLET TWICE A DAY WITH MEALS, Disp: 180 Tab, Rfl: 3  •  cephALEXin (KEFLEX) 500 MG Cap, , Disp: , Rfl:   •  atorvastatin (LIPITOR) 80 MG tablet, TAKE 1 TABLET DAILY IN THE EVENING, Disp: 90 Tab, Rfl: 3  •  Dulaglutide (TRULICITY) 1.5 MG/0.5ML Solution Pen-injector, Inject 1.5 mg as instructed every 7 days. (Patient not taking: Reported on 2/27/2019), Disp: 12 PEN, Rfl: 2  •  metformin (GLUCOPHAGE) 1000 MG tablet, Take 1 Tab by mouth 2 times a day, with meals., Disp: 180 Tab, Rfl: 2  •  gabapentin (NEURONTIN) 300 MG Cap, Take 4 pills in the AM and 2 pills at night (Patient taking differently: Take 3 pills in the AM 3 pills with lunch and 4 pills at night), Disp: 540 Cap, Rfl: 3  •  sildenafil citrate (VIAGRA) 100 MG tablet, Take 1 Tab by  mouth as needed for Erectile Dysfunction., Disp: 10 Tab, Rfl: 3  •  vitamin D3, cholecalciferol, 1000 UNIT Tab, Take 3 Tabs by mouth every day. (Patient taking differently: Take 2,000 Units by mouth every day.), Disp: 100 Tab, Rfl: 3  •  niacin 500 MG Tab, Take 1 Tab by mouth every day., Disp: 90 Tab, Rfl: 3  •  aspirin (ASA) 81 MG Chew Tab chewable tablet, Take 325 mg by mouth every day., Disp: 100 Tab, Rfl: 11  •  Blood Glucose Monitoring Suppl SUPPLIES MISC, Test strips order: Test strips for One Touch Ultra Mini meter. Sig: use daily and prn ssx high or low sugar #100 RF x 3, Disp: 100 Strip, Rfl: 3    Labs: Reviewed    Physical Examination:  Vital signs: There were no vitals taken for this visit. There is no height or weight on file to calculate BMI.  General: No apparent distress, cooperative  Eyes: No scleral icterus or discharge  ENMT: Normal on external inspection of nose, lips, normal thyroid exam  Neck: No abnormal masses on inspection  Resp: Normal effort, clear to auscultation bilaterally   CVS: Regular rate and rhythm, S1 S2 normal, no murmur   Extremities: No edema  Abdomen: abdominal obesity present  Neuro: Alert and oriented  Skin: No rash  Psych: Normal mood and affect, intact memory and able to make informed decisions    Foot Exam:  Monofilament: {DONE:16428}  Monofilament testing with a 10 gram force: sensation intact: {Sensation:92742139}  Visual Inspection: Feet {w-w/o:5700} maceration, ulcers, fissures.  Pedal pulses: {Pedal Pulses:67026216}    Assessment and Plan:    1. Uncontrolled type 2 diabetes mellitus with hyperglycemia (HCC)  ***    2. Essential hypertension  ***    3. Mixed hyperlipidemia  ***    4. Vitamin D deficiency  ***      No Follow-up on file.  The following was reviewed  - Discussed diabetic diet discussed in detail-plate method.  - He will test blood sugars and log .  - He will walk for 20-30 minutes daily.  - Reviewed medications and advised how to take   - Discussed  importance of immunizations and yearly eye exams.   - Advised daily foot exams. Educated on signs of infection.   - Educational material distributed.   - Educated on need to stay well hydrated with water.  - Educated to call with any questions or problems.    Total face to face time spent with patient equals 60 minutes. 35/60 minutes were spent on counseling the patient.    Thank you for allowing me to participate in the care of this patient.    Dr. Memo Young  This note was scribed by Stacey Gonzalez RN, CDE  04/23/19    CC:   Familia John M.D.    This note was created using voice recognition software (Dragon). The accuracy of the dictation is limited by the abilities of the software. I have reviewed the note prior to signing, however some errors in grammar and context are still possible. If you have any questions related to this note please do not hesitate to contact our office.

## 2019-04-23 ENCOUNTER — APPOINTMENT (OUTPATIENT)
Dept: ENDOCRINOLOGY | Facility: MEDICAL CENTER | Age: 49
End: 2019-04-23
Payer: COMMERCIAL

## 2019-07-15 RX ORDER — ATORVASTATIN CALCIUM 80 MG/1
TABLET, FILM COATED ORAL
Qty: 90 TAB | Refills: 0 | Status: SHIPPED | OUTPATIENT
Start: 2019-07-15 | End: 2019-10-13 | Stop reason: SDUPTHER

## 2019-07-15 NOTE — TELEPHONE ENCOUNTER
Was the patient seen in the last year in this department? No LAST SEEN 3/13/2018    Does patient have an active prescription for medications requested? No     Received Request Via: Pharmacy

## 2019-09-22 DIAGNOSIS — E78.2 MIXED HYPERLIPIDEMIA: ICD-10-CM

## 2019-09-22 DIAGNOSIS — E11.9 DIABETES MELLITUS WITHOUT COMPLICATION (HCC): ICD-10-CM

## 2019-09-22 DIAGNOSIS — I10 ESSENTIAL HYPERTENSION, BENIGN: ICD-10-CM

## 2019-09-23 RX ORDER — VALSARTAN AND HYDROCHLOROTHIAZIDE 320; 25 MG/1; MG/1
TABLET, FILM COATED ORAL
Qty: 90 TAB | Refills: 0 | Status: SHIPPED | OUTPATIENT
Start: 2019-09-23 | End: 2019-12-23

## 2019-09-23 RX ORDER — METOPROLOL SUCCINATE 100 MG/1
TABLET, EXTENDED RELEASE ORAL
Qty: 90 TAB | Refills: 0 | Status: SHIPPED | OUTPATIENT
Start: 2019-09-23 | End: 2019-12-23

## 2019-09-23 RX ORDER — AMLODIPINE BESYLATE 5 MG/1
TABLET ORAL
Qty: 90 TAB | Refills: 0 | Status: SHIPPED | OUTPATIENT
Start: 2019-09-23 | End: 2019-12-23

## 2019-09-23 NOTE — TELEPHONE ENCOUNTER
Was the patient seen in the last year in this department? No  LOV 3/13/18  LABS 2018    Does patient have an active prescription for medications requested? No     Received Request Via: Pharmacy

## 2019-10-14 RX ORDER — ATORVASTATIN CALCIUM 80 MG/1
TABLET, FILM COATED ORAL
Qty: 90 TAB | Refills: 0 | Status: SHIPPED | OUTPATIENT
Start: 2019-10-14 | End: 2020-02-20 | Stop reason: SDUPTHER

## 2019-10-14 NOTE — TELEPHONE ENCOUNTER
Requested Prescriptions     Pending Prescriptions Disp Refills   • atorvastatin (LIPITOR) 80 MG tablet [Pharmacy Med Name: ATORVASTATIN TABS 80MG] 90 Tab 0     Sig: TAKE 1 TABLET DAILY IN THE EVENING (PATIENT TO BE SEEN)

## 2019-12-22 DIAGNOSIS — I10 ESSENTIAL HYPERTENSION, BENIGN: ICD-10-CM

## 2019-12-22 DIAGNOSIS — E11.9 DIABETES MELLITUS WITHOUT COMPLICATION (HCC): ICD-10-CM

## 2019-12-22 DIAGNOSIS — E78.2 MIXED HYPERLIPIDEMIA: ICD-10-CM

## 2019-12-23 RX ORDER — VALSARTAN AND HYDROCHLOROTHIAZIDE 320; 25 MG/1; MG/1
TABLET, FILM COATED ORAL
Qty: 90 TAB | Refills: 0 | Status: SHIPPED | OUTPATIENT
Start: 2019-12-23 | End: 2020-03-25 | Stop reason: SDUPTHER

## 2019-12-23 RX ORDER — METOPROLOL SUCCINATE 100 MG/1
100 TABLET, EXTENDED RELEASE ORAL DAILY
Qty: 90 TAB | Refills: 0 | Status: SHIPPED | OUTPATIENT
Start: 2019-12-23 | End: 2020-03-24 | Stop reason: SDUPTHER

## 2019-12-23 RX ORDER — AMLODIPINE BESYLATE 5 MG/1
5 TABLET ORAL DAILY
Qty: 90 TAB | Refills: 0 | Status: SHIPPED | OUTPATIENT
Start: 2019-12-23 | End: 2020-05-06 | Stop reason: SDUPTHER

## 2019-12-23 NOTE — TELEPHONE ENCOUNTER
Was the patient seen in the last year in this department? No  lov 3/1/18    Does patient have an active prescription for medications requested? No     Received Request Via: Pharmacy

## 2020-01-14 DIAGNOSIS — E11.9 TYPE 2 DIABETES MELLITUS WITHOUT COMPLICATION, WITHOUT LONG-TERM CURRENT USE OF INSULIN (HCC): ICD-10-CM

## 2020-01-14 NOTE — TELEPHONE ENCOUNTER
Was the patient seen in the last year in this department? No  LOV 03/13/18 Currently seeing an Endocrinology for Diabetes     Does patient have an active prescription for medications requested? No     Received Request Via: Pharmacy

## 2020-01-23 ENCOUNTER — OFFICE VISIT (OUTPATIENT)
Dept: MEDICAL GROUP | Age: 50
End: 2020-01-23
Payer: COMMERCIAL

## 2020-01-23 VITALS
HEIGHT: 70 IN | DIASTOLIC BLOOD PRESSURE: 84 MMHG | OXYGEN SATURATION: 100 % | TEMPERATURE: 98 F | WEIGHT: 245.5 LBS | HEART RATE: 83 BPM | SYSTOLIC BLOOD PRESSURE: 122 MMHG | BODY MASS INDEX: 35.15 KG/M2

## 2020-01-23 DIAGNOSIS — Z23 NEED FOR VACCINATION: ICD-10-CM

## 2020-01-23 DIAGNOSIS — G62.9 NEUROPATHY: ICD-10-CM

## 2020-01-23 DIAGNOSIS — E78.2 MIXED HYPERLIPIDEMIA: ICD-10-CM

## 2020-01-23 DIAGNOSIS — Z00.00 ENCOUNTER FOR MEDICAL EXAMINATION TO ESTABLISH CARE: ICD-10-CM

## 2020-01-23 DIAGNOSIS — E11.9 DIABETES MELLITUS WITHOUT COMPLICATION (HCC): ICD-10-CM

## 2020-01-23 DIAGNOSIS — I10 ESSENTIAL HYPERTENSION, BENIGN: ICD-10-CM

## 2020-01-23 DIAGNOSIS — N40.0 BENIGN PROSTATIC HYPERPLASIA WITHOUT LOWER URINARY TRACT SYMPTOMS: ICD-10-CM

## 2020-01-23 PROCEDURE — 90472 IMMUNIZATION ADMIN EACH ADD: CPT | Performed by: FAMILY MEDICINE

## 2020-01-23 PROCEDURE — 90732 PPSV23 VACC 2 YRS+ SUBQ/IM: CPT | Performed by: FAMILY MEDICINE

## 2020-01-23 PROCEDURE — 99214 OFFICE O/P EST MOD 30 MIN: CPT | Mod: 25 | Performed by: FAMILY MEDICINE

## 2020-01-23 PROCEDURE — 90471 IMMUNIZATION ADMIN: CPT | Performed by: FAMILY MEDICINE

## 2020-01-23 PROCEDURE — 90746 HEPB VACCINE 3 DOSE ADULT IM: CPT | Performed by: FAMILY MEDICINE

## 2020-01-23 ASSESSMENT — PATIENT HEALTH QUESTIONNAIRE - PHQ9: CLINICAL INTERPRETATION OF PHQ2 SCORE: 0

## 2020-01-23 NOTE — PROGRESS NOTES
This medical record contains text that has been entered with the assistance of computer voice recognition and dictation software.  Therefore, it may contain unintended errors in text, spelling, punctuation, or grammar    Chief Complaint   Patient presents with   • Establish Care     New patient             This is a former patient of Dr. John, but he is retiring.     HPI:     1. Encounter for medical examination to establish care    Andrez Sanchez is a 49 y.o. male here to establish care he has a significant past medical history of diabetes mellitus ,, Essential hypertension, benign, Mixed hyperlipidemia, Neuropathy, Benign prostatic hyperplasia without lower urinary tract symptoms.   Past medical history- Diabetes, Hypertension, Mixed Hyperlipidemia    Family History of Cancer---None    Family History of CAD---None    Social History  Occupation----Realtor and Security on the weekend.     Exercise---No excercise      2. Diabetes mellitus with (HCC)  3. Neuropathy    Patient has a chronic history of type 2 diabetes mellitus. Not at goal. Currently on Empagliflozin 25 MG Tab, once daily, and metformin 1000 MG tablet, twice daily. He reports compliance with medications. He says he has had some side effects of increased thirst and dehydration, polyuria, sore legs and sore hands, but denies any vision changes, polydipsia, numbness or tingling to their feet, lesions or opens wounds to their feet. He reports he drinks Dr. Pepper regularly, which he is trying to ween off of. He says he did not like his former endocrinologist Dr. Young, and would like a new referral. He is currently taking gabapentin 300 mg cap, 4 doses daily for his neuropathy without side effects.     4. Essential hypertension, benign    Patient has a history of chronic hypertension and is taking valsartan-hydrochlorothiazide 320-25 MG per tablet, once daily, amlodipine 5 MG Tab, once daily and metoprolol  MG, once daily. No medication  side effects were reported. He reportedly monitors blood pressure regularly at home. Blood pressure is 122/84 today. He reports following a low sodium diet and denies any related complaints including chronic cough, chest pain, headaches, leg swelling, light-headedness or dizziness.      5. Mixed hyperlipidemia    The patient has a chronic history of mixed hyperlipidemia. Currently taking atorvastatin 80 mg tab nightly and Aspirin 81 mg daily. No medication side effects were reported including myalgias or abdominal pain. No acute complaints of dizziness, claudication or chest pain.     6. Benign prostatic hyperplasia without lower urinary tract symptoms    New Problem. The patient reports he has been waking up in the middle of the night to urinate 4-5 times. He denies alleviating factors.     7. Need for vaccination    He is requesting to receive his Pneumoccocal Polysaccharide Vaccine and Hepatitis B Vaccine today.     Current medicines (including changes today)  Current Outpatient Medications   Medication Sig Dispense Refill   • Empagliflozin (JARDIANCE) 25 MG Tab Take 25 mg by mouth every day. 90 Tab 1   • valsartan-hydrochlorothiazide (DIOVAN-HCT) 320-25 MG per tablet TAKE 1 TABLET DAILY (NEED TO BE SEEN) 90 Tab 0   • amLODIPine (NORVASC) 5 MG Tab Take 1 Tab by mouth every day. Need appointment with  Dr. John for further refills 90 Tab 0   • metoprolol SR (TOPROL XL) 100 MG TABLET SR 24 HR Take 1 Tab by mouth every day. Needs appointment 90 Tab 0   • atorvastatin (LIPITOR) 80 MG tablet TAKE 1 TABLET DAILY IN THE EVENING (PATIENT TO BE SEEN) 90 Tab 0   • esomeprazole (NEXIUM) 20 MG capsule Take 1 Cap by mouth every morning before breakfast. 30 Cap 0   • cephALEXin (KEFLEX) 500 MG Cap      • metformin (GLUCOPHAGE) 1000 MG tablet Take 1 Tab by mouth 2 times a day, with meals. 180 Tab 2   • gabapentin (NEURONTIN) 300 MG Cap Take 4 pills in the AM and 2 pills at night (Patient taking differently: Take 3 pills in  the AM 3 pills with lunch and 4 pills at night) 540 Cap 3   • vitamin D3, cholecalciferol, 1000 UNIT Tab Take 3 Tabs by mouth every day. (Patient taking differently: Take 2,000 Units by mouth every day.) 100 Tab 3   • niacin 500 MG Tab Take 1 Tab by mouth every day. 90 Tab 3   • aspirin (ASA) 81 MG Chew Tab chewable tablet Take 325 mg by mouth every day. 100 Tab 11   • Blood Glucose Monitoring Suppl SUPPLIES MISC Test strips order: Test strips for One Touch Ultra Mini meter. Sig: use daily and prn ssx high or low sugar #100 RF x 3 100 Strip 3   • metformin (GLUCOPHAGE) 1000 MG tablet TAKE 1 TABLET TWICE A DAY WITH MEALS (NEED TO BE SEEN) (Patient not taking: Reported on 2020) 180 Tab 0   • Dulaglutide (TRULICITY) 1.5 MG/0.5ML Solution Pen-injector Inject 1.5 mg as instructed every 7 days. (Patient not taking: Reported on 2019) 12 PEN 2   • sildenafil citrate (VIAGRA) 100 MG tablet Take 1 Tab by mouth as needed for Erectile Dysfunction. (Patient not taking: Reported on 2020) 10 Tab 3     No current facility-administered medications for this visit.      He  has a past medical history of Diabetes (HCC), Hyperlipidemia, Hypertension, and Obesity.  He  has a past surgical history that includes av fistula revision () and uvulopharyngopalatoplasty ().  Social History     Tobacco Use   • Smoking status: Never Smoker   • Smokeless tobacco: Never Used   Substance Use Topics   • Alcohol use: Yes     Comment: rare   • Drug use: No     Social History     Patient does not qualify to have social determinant information on file (likely too young).   Social History Narrative   • Not on file     Family History   Problem Relation Age of Onset   • Cancer Mother    • Diabetes Paternal Grandmother      Family Status   Relation Name Status   • Mo   at age 68        CHF   • Fa  Alive   • Bro  Alive   • Bro  Alive   • Son  Alive   • Son  Alive   • PGMo  (Not Specified)         ROS    Please see hpi     All  "other systems reviewed and are negative     Objective:     /84 (BP Location: Left arm, Patient Position: Sitting, BP Cuff Size: Adult)   Pulse 83   Temp 36.7 °C (98 °F) (Temporal)   Ht 1.778 m (5' 10\")   Wt 111.4 kg (245 lb 8 oz)   SpO2 100%  Body mass index is 35.23 kg/m².  Physical Exam:    Constitutional: Alert, no distress.  Skin: Warm, dry, good turgor, no rashes in visible areas.  Eye: Equal, round and reactive, conjunctiva clear, lids normal.  ENMT: Lips without lesions, good dentition, oropharynx clear.  Neck: Trachea midline, no masses, no thyromegaly. No cervical or supraclavicular lymphadenopathy.  Respiratory: Unlabored respiratory effort, lungs clear to auscultation, no wheezes, no ronchi.  Cardiovascular: Normal S1, S2, no murmur, no edema.  Psych: Alert and oriented x3, normal affect and mood.      Assessment and Plan:   The following treatment plan was discussed    1. Encounter for medical examination to establish care    Care has been established  We need baseline labs to establish a clinical profile  We will continue daily prophylactic aspirin for now as he has been on and stable.      This patient is due for   • RETINAL SCREENING  09/13/2018   • A1C SCREENING  03/06/2019   • DIABETES MONOFILAMENT / LE EXAM  03/13/2019   • FASTING LIPID PROFILE  09/06/2019   • URINE ACR / MICROALBUMIN  09/06/2019   • SERUM CREATININE  09/06/2019     Requested Medical records to be sent to us  Denies intimate partner viloence  Discussed seat belt safety    2. Diabetes mellitus without complication (HCC)  3. Neuropathy    Chronic, not at goal. He has been referred to Endocrinology. He will receive labs for follow up, 1-2 weeks prior to next visit.      - REFERRAL TO ENDOCRINOLOGY  - MICROALBUMIN CREAT RATIO URINE; Future  - Comp Metabolic Panel; Future  - CBC WITHOUT DIFFERENTIAL; Future  - Lipid Profile; Future  - MICROALBUMIN CREAT RATIO URINE; Future  - HEMOGLOBIN A1C; Future    4. Essential " hypertension, benign    Chronic. Patient has been stable with current management using valsartan-hydrochlorothiazide 320-25 MG per tablet, once daily, amlodipine 5 MG Tab, once daily and metoprolol  MG, once daily. We will make no changes for now.     5. Mixed hyperlipidemia    Chronic. Patient has been stable with current management using atorvastatin 80 mg tab nightly and Aspirin 81 mg daily. We will make no changes for now.     7. Benign prostatic hyperplasia without lower urinary tract symptoms    Acute. The patient will receive Prostate Specific AG Diagnostic for further evaluation.     - PROSTATE SPECIFIC AG DIAGNOSTIC; Future    8. Need for vaccination    Pneumoccocal Polysaccharide Vaccine 23-Valent and Hepatitis B Vaccine was administered today without adverse event.     - Pneumoccocal Polysaccharide Vaccine 23-Valent =>3yo SQ/IM  - Hepatitis B Vaccine Adult IM      HEALTH MAINTENANCE:    • IMM PNEUMOCOCCAL VACCINE: 0-64 Years (1 of 1 - PPSV23) 08/09/1977   • IMM HEP B VACCINE (1 of 3 - Risk 3-dose series) 08/09/1990   • RETINAL SCREENING  09/13/2018   • A1C SCREENING  03/06/2019   • DIABETES MONOFILAMENT / LE EXAM  03/13/2019   • FASTING LIPID PROFILE  09/06/2019   • URINE ACR / MICROALBUMIN  09/06/2019   • SERUM CREATININE  09/06/2019     Instructed to Follow up in clinic or ER for worsening symptoms, difficulty breathing, lack of expected recovery, or should new symptoms or problems arise.    Followup: Return in about 3 months (around 4/23/2020).      Once again this medical record contains text that has been entered with the assistance of computer voice recognition, dictation software, and medical scribes.  Therefore, it may contain unintended errors in text, spelling, punctuation, or grammar.    IDoug (Scribvincent), am scribing for, and in the presence of, Pablo Cordova M.D.    Electronically signed by: Doug Newton ([place]), 1/23/2020     Pablo JACOB M.D. personally  performed the services described in this documentation, as scribed by Doug Newton in my presence, and it is both accurate and complete.

## 2020-01-27 ENCOUNTER — HOSPITAL ENCOUNTER (OUTPATIENT)
Dept: LAB | Facility: MEDICAL CENTER | Age: 50
End: 2020-01-27
Attending: FAMILY MEDICINE
Payer: COMMERCIAL

## 2020-01-27 DIAGNOSIS — N40.0 BENIGN PROSTATIC HYPERPLASIA WITHOUT LOWER URINARY TRACT SYMPTOMS: ICD-10-CM

## 2020-01-27 DIAGNOSIS — E11.9 DIABETES MELLITUS WITHOUT COMPLICATION (HCC): ICD-10-CM

## 2020-01-27 LAB
ALBUMIN SERPL BCP-MCNC: 4.3 G/DL (ref 3.2–4.9)
ALBUMIN/GLOB SERPL: 1.4 G/DL
ALP SERPL-CCNC: 75 U/L (ref 30–99)
ALT SERPL-CCNC: 17 U/L (ref 2–50)
ANION GAP SERPL CALC-SCNC: 16 MMOL/L (ref 0–11.9)
AST SERPL-CCNC: 14 U/L (ref 12–45)
BILIRUB SERPL-MCNC: 1 MG/DL (ref 0.1–1.5)
BUN SERPL-MCNC: 19 MG/DL (ref 8–22)
CALCIUM SERPL-MCNC: 10.2 MG/DL (ref 8.5–10.5)
CHLORIDE SERPL-SCNC: 96 MMOL/L (ref 96–112)
CHOLEST SERPL-MCNC: 167 MG/DL (ref 100–199)
CO2 SERPL-SCNC: 27 MMOL/L (ref 20–33)
CREAT SERPL-MCNC: 1.23 MG/DL (ref 0.5–1.4)
ERYTHROCYTE [DISTWIDTH] IN BLOOD BY AUTOMATED COUNT: 37.9 FL (ref 35.9–50)
GLOBULIN SER CALC-MCNC: 3 G/DL (ref 1.9–3.5)
GLUCOSE SERPL-MCNC: 368 MG/DL (ref 65–99)
HCT VFR BLD AUTO: 46.1 % (ref 42–52)
HDLC SERPL-MCNC: 34 MG/DL
HGB BLD-MCNC: 16.2 G/DL (ref 14–18)
LDLC SERPL CALC-MCNC: ABNORMAL MG/DL
MCH RBC QN AUTO: 31.7 PG (ref 27–33)
MCHC RBC AUTO-ENTMCNC: 35.1 G/DL (ref 33.7–35.3)
MCV RBC AUTO: 90.2 FL (ref 81.4–97.8)
PLATELET # BLD AUTO: 228 K/UL (ref 164–446)
PMV BLD AUTO: 10.7 FL (ref 9–12.9)
POTASSIUM SERPL-SCNC: 3.7 MMOL/L (ref 3.6–5.5)
PROT SERPL-MCNC: 7.3 G/DL (ref 6–8.2)
RBC # BLD AUTO: 5.11 M/UL (ref 4.7–6.1)
SODIUM SERPL-SCNC: 139 MMOL/L (ref 135–145)
TRIGL SERPL-MCNC: 527 MG/DL (ref 0–149)
WBC # BLD AUTO: 5.9 K/UL (ref 4.8–10.8)

## 2020-01-27 PROCEDURE — 82570 ASSAY OF URINE CREATININE: CPT

## 2020-01-27 PROCEDURE — 84153 ASSAY OF PSA TOTAL: CPT

## 2020-01-27 PROCEDURE — 80061 LIPID PANEL: CPT

## 2020-01-27 PROCEDURE — 83036 HEMOGLOBIN GLYCOSYLATED A1C: CPT

## 2020-01-27 PROCEDURE — 80053 COMPREHEN METABOLIC PANEL: CPT

## 2020-01-27 PROCEDURE — 82043 UR ALBUMIN QUANTITATIVE: CPT

## 2020-01-27 PROCEDURE — 36415 COLL VENOUS BLD VENIPUNCTURE: CPT

## 2020-01-27 PROCEDURE — 85027 COMPLETE CBC AUTOMATED: CPT

## 2020-01-28 LAB
CREAT UR-MCNC: 56.7 MG/DL
EST. AVERAGE GLUCOSE BLD GHB EST-MCNC: 372 MG/DL
HBA1C MFR BLD: 14.6 % (ref 0–5.6)
MICROALBUMIN UR-MCNC: <0.7 MG/DL
MICROALBUMIN/CREAT UR: NORMAL MG/G (ref 0–30)
PSA SERPL-MCNC: 2.11 NG/ML (ref 0–4)

## 2020-02-06 ENCOUNTER — OFFICE VISIT (OUTPATIENT)
Dept: MEDICAL GROUP | Age: 50
End: 2020-02-06
Payer: COMMERCIAL

## 2020-02-06 DIAGNOSIS — E11.65 UNCONTROLLED TYPE 2 DIABETES MELLITUS WITH HYPERGLYCEMIA (HCC): ICD-10-CM

## 2020-02-06 PROCEDURE — 99213 OFFICE O/P EST LOW 20 MIN: CPT | Performed by: FAMILY MEDICINE

## 2020-02-06 RX ORDER — FLASH GLUCOSE SENSOR
1 KIT MISCELLANEOUS
Qty: 3 EACH | Refills: 9 | Status: SHIPPED | OUTPATIENT
Start: 2020-02-06 | End: 2020-03-07 | Stop reason: SDUPTHER

## 2020-02-06 RX ORDER — FLASH GLUCOSE SENSOR
1 KIT MISCELLANEOUS CONTINUOUS
Qty: 1 DEVICE | Refills: 0 | Status: SHIPPED | OUTPATIENT
Start: 2020-02-06 | End: 2020-02-28 | Stop reason: SDUPTHER

## 2020-02-06 RX ORDER — GLIPIZIDE 5 MG/1
5 TABLET ORAL 2 TIMES DAILY
Qty: 60 TAB | Refills: 2 | Status: SHIPPED
Start: 2020-02-06 | End: 2021-01-28

## 2020-02-06 NOTE — PROGRESS NOTES
RN-STEFANIE Note    Subjective:     Health changes since last visit/interval Hx: None    Medications (including changes made today)  Current Outpatient Medications   Medication Sig Dispense Refill   • Continuous Blood Gluc Sensor (FREESTYLE DINA 14 DAY SENSOR) Misc 1 Each by Does not apply route every 14 days. 3 Each 9   • Continuous Blood Gluc  (FREESTYLE DINA READER) Device 1 Each by Does not apply route Continuous. 1 Device 0   • Dulaglutide (TRULICITY) 0.75 MG/0.5ML Solution Pen-injector Inject 0.75 mg as instructed every 7 days. 4 PEN 3   • glipiZIDE (GLUCOTROL) 5 MG Tab Take 1 Tab by mouth 2 times a day. 60 Tab 2   • Empagliflozin (JARDIANCE) 25 MG Tab Take 25 mg by mouth every day. 90 Tab 1   • valsartan-hydrochlorothiazide (DIOVAN-HCT) 320-25 MG per tablet TAKE 1 TABLET DAILY (NEED TO BE SEEN) 90 Tab 0   • amLODIPine (NORVASC) 5 MG Tab Take 1 Tab by mouth every day. Need appointment with  Dr. John for further refills 90 Tab 0   • metformin (GLUCOPHAGE) 1000 MG tablet TAKE 1 TABLET TWICE A DAY WITH MEALS (NEED TO BE SEEN) (Patient not taking: Reported on 1/23/2020) 180 Tab 0   • metoprolol SR (TOPROL XL) 100 MG TABLET SR 24 HR Take 1 Tab by mouth every day. Needs appointment 90 Tab 0   • atorvastatin (LIPITOR) 80 MG tablet TAKE 1 TABLET DAILY IN THE EVENING (PATIENT TO BE SEEN) 90 Tab 0   • esomeprazole (NEXIUM) 20 MG capsule Take 1 Cap by mouth every morning before breakfast. 30 Cap 0   • cephALEXin (KEFLEX) 500 MG Cap      • metformin (GLUCOPHAGE) 1000 MG tablet Take 1 Tab by mouth 2 times a day, with meals. 180 Tab 2   • gabapentin (NEURONTIN) 300 MG Cap Take 4 pills in the AM and 2 pills at night (Patient taking differently: Take 3 pills in the AM 3 pills with lunch and 4 pills at night) 540 Cap 3   • sildenafil citrate (VIAGRA) 100 MG tablet Take 1 Tab by mouth as needed for Erectile Dysfunction. (Patient not taking: Reported on 1/23/2020) 10 Tab 3   • vitamin D3, cholecalciferol, 1000 UNIT Tab  "Take 3 Tabs by mouth every day. (Patient taking differently: Take 2,000 Units by mouth every day.) 100 Tab 3   • niacin 500 MG Tab Take 1 Tab by mouth every day. 90 Tab 3   • aspirin (ASA) 81 MG Chew Tab chewable tablet Take 325 mg by mouth every day. 100 Tab 11   • Blood Glucose Monitoring Suppl SUPPLIES MISC Test strips order: Test strips for One Touch Ultra Mini meter. Sig: use daily and prn ssx high or low sugar #100 RF x 3 100 Strip 3     No current facility-administered medications for this visit.        Taking daily ASA: Yes  Taking above medications as prescribed: yes  SIDE EFFECTS: Patient denies side effects to medications    Exercise: sporadic irregular exercise, <half hour walking weekly  Diet: \"unhealthy\" diet in general  Patient's body mass index is unknown because there is no height or weight on file. Exercise and nutrition counseling were performed at this visit.      Health Maintenance:   Health Maintenance Due   Topic Date Due   • RETINAL SCREENING  09/13/2018   • DIABETES MONOFILAMENT / LE EXAM  03/13/2019   • IMM HEP B VACCINE (2 of 3 - Risk 3-dose series) 02/20/2020         DM:   Last A1c:   Lab Results   Component Value Date/Time    HBA1C 14.6 (H) 01/27/2020 10:28 AM      A1C GOAL: < 7    Glucose monitoring frequency:  Testing 5 times per day, blood sugars are labile.   Hypoglycemic episodes: no    Last Retinal Exam: has referral for eye exam  Daily Foot Exam: Yes       Lab Results   Component Value Date/Time    MICROALBCALC 3.3 09/28/2017 09:16 AM    MALBCRT see below 01/27/2020 10:28 AM    MICROALBUR <0.7 01/27/2020 10:28 AM    MICRALB 3.3 09/28/2017 09:16 AM        ACR Albumin/Creatinine Ratio goal <30     HTN:   Blood pressure goal <140/<80 .   Currently Rx ACE/ARB: No    Dyslipidemia:    Lab Results   Component Value Date/Time    CHOLSTRLTOT 167 01/27/2020 10:28 AM    LDL see below 01/27/2020 10:28 AM    HDL 34 (A) 01/27/2020 10:28 AM    TRIGLYCERIDE 527 (H) 01/27/2020 10:28 AM       Lab " Results   Component Value Date/Time    SODIUM 139 01/27/2020 10:28 AM    POTASSIUM 3.7 01/27/2020 10:28 AM    CHLORIDE 96 01/27/2020 10:28 AM    CO2 27 01/27/2020 10:28 AM    GLUCOSE 368 (H) 01/27/2020 10:28 AM    BUN 19 01/27/2020 10:28 AM    CREATININE 1.23 01/27/2020 10:28 AM    CREATININE 1.0 01/18/2008 09:22 AM    BUNCREATRAT 12 09/28/2017 09:16 AM     Lab Results   Component Value Date/Time    ALKPHOSPHAT 75 01/27/2020 10:28 AM    ASTSGOT 14 01/27/2020 10:28 AM    ALTSGPT 17 01/27/2020 10:28 AM    TBILIRUBIN 1.0 01/27/2020 10:28 AM        Currently Rx Statin: Yes    He  reports that he has never smoked. He has never used smokeless tobacco.    Objective:     Exam:  Monofilament: done   Monofilament testing with a 10 gram force: sensation intact: intact bilaterally  Visual Inspection: Feet without maceration, ulcers, fissures.  Pedal pulses: intact bilaterally    Plan:       1. Uncontrolled type 2 diabetes mellitus with hyperglycemia (HCC)    Discussed and educated on:   - All medications, side effects and compliance (discussed carefully)  - Annual eye examinations at Ophthalmology  - Factors Affecting Blood Glucose Control: food, illness, medication and stress  - Foot Care: what to look for when checking feet every day and when to contact HCP  - HbA1C: target  - Home glucose monitoring emphasized  - Weight control and daily exercise    Recommended medication changes: Start back on Trulicity, start again at the .75 mg per week dose.  Start back on Glipizide 5 mg bid and Increase Metformin to 1000 mg bid.     Follow up in 3 weeks.       Over 40 minutes spent with patient face to face, greater than 50% time spent with plan/cordination of care as above in my A/P.  We discussed the importance of diabetic control, and the adverse events associated with poorly controlled diabetes.  These issues include but not limited to amputation of extremity, blindness, renal failure and death.  We also discussed that Trulicity  will likely help him lose weight as well, we also discussed the importance of lifestyle modification such as diet exercise.      I attest that I saw this patient on this date and due to technical issues am not showing as the author of the note.        Pablo Cordova MD  [facility]  [address] [place] [id number]

## 2020-02-10 ENCOUNTER — PATIENT MESSAGE (OUTPATIENT)
Dept: HEALTH INFORMATION MANAGEMENT | Facility: OTHER | Age: 50
End: 2020-02-10

## 2020-02-20 RX ORDER — ATORVASTATIN CALCIUM 80 MG/1
TABLET, FILM COATED ORAL
Qty: 90 TAB | Refills: 0 | Status: SHIPPED | OUTPATIENT
Start: 2020-02-20 | End: 2020-02-27 | Stop reason: SDUPTHER

## 2020-02-27 DIAGNOSIS — E11.65 UNCONTROLLED TYPE 2 DIABETES MELLITUS WITH HYPERGLYCEMIA (HCC): ICD-10-CM

## 2020-02-27 RX ORDER — ATORVASTATIN CALCIUM 80 MG/1
TABLET, FILM COATED ORAL
Qty: 90 TAB | Refills: 2 | Status: SHIPPED | OUTPATIENT
Start: 2020-02-27 | End: 2020-11-04

## 2020-02-28 RX ORDER — FLASH GLUCOSE SENSOR
1 KIT MISCELLANEOUS CONTINUOUS
Qty: 1 DEVICE | Refills: 0 | Status: SHIPPED | OUTPATIENT
Start: 2020-02-28 | End: 2021-03-24 | Stop reason: SDUPTHER

## 2020-02-28 NOTE — TELEPHONE ENCOUNTER
Received request via: Patient    Was the patient seen in the last year in this department? Yes    Does the patient have an active prescription (recently filled or refills available) for medication(s) requested? No       PLEASE SEND IN TO [facility]

## 2020-03-07 DIAGNOSIS — E11.65 UNCONTROLLED TYPE 2 DIABETES MELLITUS WITH HYPERGLYCEMIA (HCC): ICD-10-CM

## 2020-03-11 RX ORDER — FLASH GLUCOSE SENSOR
1 KIT MISCELLANEOUS
Qty: 3 EACH | Refills: 0 | Status: SHIPPED | OUTPATIENT
Start: 2020-03-11 | End: 2020-05-27

## 2020-03-19 ENCOUNTER — APPOINTMENT (OUTPATIENT)
Dept: ENDOCRINOLOGY | Facility: MEDICAL CENTER | Age: 50
End: 2020-03-19
Payer: COMMERCIAL

## 2020-03-24 DIAGNOSIS — I10 ESSENTIAL HYPERTENSION, BENIGN: ICD-10-CM

## 2020-03-25 DIAGNOSIS — E78.2 MIXED HYPERLIPIDEMIA: ICD-10-CM

## 2020-03-25 RX ORDER — METOPROLOL SUCCINATE 100 MG/1
100 TABLET, EXTENDED RELEASE ORAL DAILY
Qty: 90 TAB | Refills: 0 | Status: SHIPPED | OUTPATIENT
Start: 2020-03-25 | End: 2020-05-06 | Stop reason: SDUPTHER

## 2020-03-26 RX ORDER — VALSARTAN AND HYDROCHLOROTHIAZIDE 320; 25 MG/1; MG/1
TABLET, FILM COATED ORAL
Qty: 90 TAB | Refills: 0 | Status: SHIPPED | OUTPATIENT
Start: 2020-03-26 | End: 2020-05-06 | Stop reason: SDUPTHER

## 2020-04-23 ENCOUNTER — NON-PROVIDER VISIT (OUTPATIENT)
Dept: MEDICAL GROUP | Age: 50
End: 2020-04-23
Payer: COMMERCIAL

## 2020-04-23 VITALS
RESPIRATION RATE: 16 BRPM | WEIGHT: 258 LBS | HEIGHT: 70 IN | TEMPERATURE: 96.8 F | BODY MASS INDEX: 36.94 KG/M2 | HEART RATE: 72 BPM | SYSTOLIC BLOOD PRESSURE: 98 MMHG | DIASTOLIC BLOOD PRESSURE: 60 MMHG

## 2020-04-23 DIAGNOSIS — E11.65 UNCONTROLLED TYPE 2 DIABETES MELLITUS WITH HYPERGLYCEMIA (HCC): ICD-10-CM

## 2020-04-23 DIAGNOSIS — G62.9 NEUROPATHY: ICD-10-CM

## 2020-04-23 LAB
HBA1C MFR BLD: 7.4 % (ref 0–5.6)
INT CON NEG: ABNORMAL
INT CON POS: ABNORMAL

## 2020-04-23 PROCEDURE — 83036 HEMOGLOBIN GLYCOSYLATED A1C: CPT | Performed by: FAMILY MEDICINE

## 2020-04-23 PROCEDURE — 99999 PR NO CHARGE: CPT | Performed by: FAMILY MEDICINE

## 2020-04-23 RX ORDER — DULAGLUTIDE 1.5 MG/.5ML
1.5 INJECTION, SOLUTION SUBCUTANEOUS
COMMUNITY
End: 2020-06-23 | Stop reason: SDUPTHER

## 2020-04-23 ASSESSMENT — FIBROSIS 4 INDEX: FIB4 SCORE: 0.71

## 2020-05-06 DIAGNOSIS — E11.9 TYPE 2 DIABETES MELLITUS WITHOUT COMPLICATION, WITHOUT LONG-TERM CURRENT USE OF INSULIN (HCC): ICD-10-CM

## 2020-05-06 DIAGNOSIS — I10 ESSENTIAL HYPERTENSION, BENIGN: ICD-10-CM

## 2020-05-06 DIAGNOSIS — E78.2 MIXED HYPERLIPIDEMIA: ICD-10-CM

## 2020-05-06 RX ORDER — EMPAGLIFLOZIN 25 MG/1
25 TABLET, FILM COATED ORAL DAILY
Qty: 90 TAB | Refills: 0 | Status: SHIPPED | OUTPATIENT
Start: 2020-05-06 | End: 2020-09-04

## 2020-05-06 NOTE — TELEPHONE ENCOUNTER
Received request via: Pharmacy    Was the patient seen in the last year in this department? No  lov 3/13/18    Does the patient have an active prescription (recently filled or refills available) for medication(s) requested? No

## 2020-05-07 RX ORDER — METOPROLOL SUCCINATE 100 MG/1
100 TABLET, EXTENDED RELEASE ORAL DAILY
Qty: 90 TAB | Refills: 0 | Status: SHIPPED | OUTPATIENT
Start: 2020-05-07 | End: 2020-08-07

## 2020-05-07 RX ORDER — VALSARTAN AND HYDROCHLOROTHIAZIDE 320; 25 MG/1; MG/1
TABLET, FILM COATED ORAL
Qty: 90 TAB | Refills: 0 | Status: SHIPPED | OUTPATIENT
Start: 2020-05-07 | End: 2020-08-07

## 2020-05-07 RX ORDER — AMLODIPINE BESYLATE 5 MG/1
5 TABLET ORAL DAILY
Qty: 90 TAB | Refills: 0 | Status: SHIPPED | OUTPATIENT
Start: 2020-05-07 | End: 2020-08-07

## 2020-05-13 DIAGNOSIS — E11.9 TYPE 2 DIABETES MELLITUS WITHOUT COMPLICATION, WITHOUT LONG-TERM CURRENT USE OF INSULIN (HCC): ICD-10-CM

## 2020-05-14 NOTE — TELEPHONE ENCOUNTER
Received request via: Pharmacy    Was the patient seen in the last year in this department? Yes    Does the patient have an active prescription (recently filled or refills available) for medication(s) requested? Needs medication sent to [facility]

## 2020-05-26 DIAGNOSIS — E11.65 UNCONTROLLED TYPE 2 DIABETES MELLITUS WITH HYPERGLYCEMIA (HCC): ICD-10-CM

## 2020-05-27 RX ORDER — FLASH GLUCOSE SENSOR
KIT MISCELLANEOUS
Qty: 3 EACH | Refills: 7 | Status: SHIPPED | OUTPATIENT
Start: 2020-05-27 | End: 2021-03-24 | Stop reason: SDUPTHER

## 2020-07-22 ENCOUNTER — OFFICE VISIT (OUTPATIENT)
Dept: ENDOCRINOLOGY | Facility: MEDICAL CENTER | Age: 50
End: 2020-07-22
Attending: INTERNAL MEDICINE
Payer: COMMERCIAL

## 2020-07-22 VITALS
WEIGHT: 256.3 LBS | HEART RATE: 84 BPM | HEIGHT: 70 IN | SYSTOLIC BLOOD PRESSURE: 120 MMHG | BODY MASS INDEX: 36.69 KG/M2 | DIASTOLIC BLOOD PRESSURE: 76 MMHG | OXYGEN SATURATION: 98 %

## 2020-07-22 DIAGNOSIS — E78.2 MIXED HYPERLIPIDEMIA: ICD-10-CM

## 2020-07-22 DIAGNOSIS — I10 ESSENTIAL HYPERTENSION: ICD-10-CM

## 2020-07-22 DIAGNOSIS — Z79.84 LONG TERM (CURRENT) USE OF ORAL HYPOGLYCEMIC DRUGS: ICD-10-CM

## 2020-07-22 DIAGNOSIS — E11.49 CONTROLLED TYPE 2 DIABETES MELLITUS WITH OTHER NEUROLOGIC COMPLICATION, WITHOUT LONG-TERM CURRENT USE OF INSULIN (HCC): ICD-10-CM

## 2020-07-22 PROBLEM — E11.9 DIABETES MELLITUS TYPE II, CONTROLLED (HCC): Status: ACTIVE | Noted: 2020-07-22

## 2020-07-22 LAB
HBA1C MFR BLD: 7.2 % (ref 0–5.6)
INT CON NEG: NEGATIVE
INT CON POS: POSITIVE

## 2020-07-22 PROCEDURE — 99213 OFFICE O/P EST LOW 20 MIN: CPT | Performed by: INTERNAL MEDICINE

## 2020-07-22 PROCEDURE — 83036 HEMOGLOBIN GLYCOSYLATED A1C: CPT | Performed by: INTERNAL MEDICINE

## 2020-07-22 PROCEDURE — 95251 CONT GLUC MNTR ANALYSIS I&R: CPT | Performed by: INTERNAL MEDICINE

## 2020-07-22 PROCEDURE — 99214 OFFICE O/P EST MOD 30 MIN: CPT | Performed by: INTERNAL MEDICINE

## 2020-07-22 ASSESSMENT — FIBROSIS 4 INDEX: FIB4 SCORE: 0.71

## 2020-07-22 NOTE — PROGRESS NOTES
CHIEF COMPLAINT: Patient is here for follow up of Type 2 Diabetes Mellitus    HPI:     Andrez Sanchez is a 49 y.o. male with Type 2 Diabetes Mellitus here for follow up.    Labs from 7/22/2020 HbA1c is fair at 7.2%    On follow-up he is taking Metformin 1000 mg twice a day, Glipizide 5 mg daily, Jardiance 25 mg daily (not taking)  and Trulicity 1.5 mg weekly.    He reports occasional hypoglycemia which I explained to him is related to the glipizide  He thought that this was related to Jardiance and this explains why he stopped taking the Jardiance  He denies severe hyperglycemia      He has a Dexcom G6 CGM which we downloaded and reviewed today.  He has been on a therapeutic CGM for over 90 days  His average blood sugar is well controlled at 133 with a standard deviation of 22%.  His time in range is very good at 93%      He has a history of uncontrolled mixed hyperlipidemia at baseline  He is currently on atorvastatin 80 mg daily  His baseline labs showed elevated triglycerides of 527, direct LDL cholesterol was not measured      He has essential hypertension and is taking Diovan HCTZ, amlodipine and metoprolol  He is tolerating his medications fairly well  He has no proteinuria at baseline          BG Diary:07/22/20  Please see Dexcom G6 CGM download    Weight has been stable    Diabetes Complications   Retinopathy: No known retinopathy.  Last eye exam: We are requesting records of his eye exam  Neuropathy: Denies paresthesias or numbness in hands or feet. Denies any foot wounds.  Exercise: Minimal.  Diet: Fair.  Patient's medications, allergies, and social histories were reviewed and updated as appropriate.    ROS:     CONS:     No fever, no chills   EYES:     No diplopia, no blurry vision   CV:           No chest pain, no palpitations   PULM:     No SOB, no cough, no hemoptysis.   GI:            No nausea, no vomiting, no diarrhea, no constipation   ENDO:     No polyuria, no polydipsia, no heat  "intolerance, no cold intolerance       Past Medical History:  Problem List:  2020-07: Diabetes mellitus type II, controlled ([facility])  2020-02: Uncontrolled type 2 diabetes mellitus with hyperglycemia   ([facility])  2020-01: Neuropathy  2020-01: Benign prostatic hyperplasia without lower urinary tract   symptoms  2018-01: Obesity (BMI 30-39.9)  2017-11: Pain in both lower extremities  2017-03: Erectile dysfunction  2016-06: Nonspecific abnormal electrocardiogram (ECG) (EKG) -   possible WPW pattern  2016-06: Diabetes mellitus without complication ([facility])  2014-10: Mixed hyperlipidemia  2014-09: Vitamin D deficiency  2014-09: AV fistula ([facility])  2014-09: Hyperglycemia  2014-09: Umbilical hernia  2014-08: Essential hypertension, benign  Obesity      Past Surgical History:  Past Surgical History:   Procedure Laterality Date   • UVULOPHARYNGOPALATOPLASTY  2006    uvulectomy for sleep apnea   • AV FISTULA REVISION  1973    Repair Congenital Chest AV Fistula        Allergies:  Patient has no known allergies.     Social History:  Social History     Tobacco Use   • Smoking status: Never Smoker   • Smokeless tobacco: Never Used   Substance Use Topics   • Alcohol use: Yes     Comment: rare   • Drug use: No        Family History:   family history includes Cancer in his mother; Diabetes in his paternal grandmother.      PHYSICAL EXAM:   OBJECTIVE:  Vital signs: /76 (BP Location: Left arm, Patient Position: Sitting, BP Cuff Size: Large adult)   Pulse 84   Ht 1.778 m (5' 10\")   Wt 116.3 kg (256 lb 4.8 oz)   SpO2 98%   BMI 36.78 kg/m²   GENERAL: Well-developed, well-nourished in no apparent distress.   EYE:  No ocular asymmetry, PERRLA  HENT: Pink, moist mucous membranes.    NECK: No thyromegaly.   CARDIOVASCULAR:  No murmurs  LUNGS: Clear breath sounds  ABDOMEN: Soft, nontender   EXTREMITIES: No clubbing, cyanosis, or edema.   NEUROLOGICAL: No gross focal motor abnormalities   LYMPH: No cervical adenopathy palpated.   SKIN: No " rashes, lesions.   Monofilament testing with a 10 gram force: sensation: intact bilaterally  Visual Inspection: Feet without maceration, ulcers, or fissures.  Pedal pulses: intact bilaterally      Labs:  Lab Results   Component Value Date/Time    HBA1C 7.2 (A) 07/22/2020 08:47 AM        Lab Results   Component Value Date/Time    WBC 5.9 01/27/2020 10:28 AM    RBC 5.11 01/27/2020 10:28 AM    HEMOGLOBIN 16.2 01/27/2020 10:28 AM    MCV 90.2 01/27/2020 10:28 AM    MCH 31.7 01/27/2020 10:28 AM    MCHC 35.1 01/27/2020 10:28 AM    RDW 37.9 01/27/2020 10:28 AM    MPV 10.7 01/27/2020 10:28 AM       Lab Results   Component Value Date/Time    SODIUM 139 01/27/2020 10:28 AM    POTASSIUM 3.7 01/27/2020 10:28 AM    CHLORIDE 96 01/27/2020 10:28 AM    CO2 27 01/27/2020 10:28 AM    ANION 16.0 (H) 01/27/2020 10:28 AM    GLUCOSE 368 (H) 01/27/2020 10:28 AM    BUN 19 01/27/2020 10:28 AM    CREATININE 1.23 01/27/2020 10:28 AM    CREATININE 1.0 01/18/2008 09:22 AM    CALCIUM 10.2 01/27/2020 10:28 AM    ASTSGOT 14 01/27/2020 10:28 AM    ALTSGPT 17 01/27/2020 10:28 AM    TBILIRUBIN 1.0 01/27/2020 10:28 AM    ALBUMIN 4.3 01/27/2020 10:28 AM    TOTPROTEIN 7.3 01/27/2020 10:28 AM    GLOBULIN 3.0 01/27/2020 10:28 AM    AGRATIO 1.4 01/27/2020 10:28 AM       Lab Results   Component Value Date/Time    CHOLSTRLTOT 167 01/27/2020 1028    TRIGLYCERIDE 527 (H) 01/27/2020 1028    HDL 34 (A) 01/27/2020 1028    LDL see below 01/27/2020 1028       Lab Results   Component Value Date/Time    MICROALBCALC 3.3 09/28/2017 09:16 AM    MALBCRT see below 01/27/2020 10:28 AM    MICROALBUR <0.7 01/27/2020 10:28 AM    MICRALB 3.3 09/28/2017 09:16 AM        Lab Results   Component Value Date/Time    TSHULTRASEN 1.920 03/08/2017 1043     No results found for: FREEDIR  No results found for: FREET3  No results found for: THYSTIMIG        ASSESSMENT/PLAN:     1. Controlled type 2 diabetes mellitus with other neurologic complication, without long-term current use of  insulin (HCC)  Fair control  A1c is better at 7.2%  Recommend that he restart Jardiance  Explained to him that his hypoglycemia is from glipizide  Continue Metformin  Continue Trulicity  I recommend that he watch his carb intake closely because he is eating a lot of rice and other [place] food  We will plan for follow-up in 3 months with a repeat of his A1c and other labs    2. Mixed hyperlipidemia  Uncontrolled at baseline  Continue current statin  I expect that his triglycerides will now be better controlled now that his diabetes is better controlled  I am repeating a fasting lipid profile with his next labs    3. Essential hypertension  Well-controlled  Continue current medications    4. Long term (current) use of oral hypoglycemic drugs  Patient is on multiple oral agents plus a GLP-1 for type 2 diabetes management      Return in about 3 months (around 10/22/2020).      This patient during there office visit today was started on a new medication.  Side effects of the new medication were discussed with the patient today in the office.     Thank you kindly for allowing me to participate in the diabetes care plan for this patient.    Demond Castaneda MD, [place], [facility]  07/22/20    CC:   Pablo Cordova M.D.

## 2020-08-05 DIAGNOSIS — E78.2 MIXED HYPERLIPIDEMIA: ICD-10-CM

## 2020-08-05 DIAGNOSIS — I10 ESSENTIAL HYPERTENSION, BENIGN: ICD-10-CM

## 2020-08-07 RX ORDER — METOPROLOL SUCCINATE 100 MG/1
TABLET, EXTENDED RELEASE ORAL
Qty: 30 TAB | Refills: 0 | Status: SHIPPED | OUTPATIENT
Start: 2020-08-07 | End: 2020-08-20 | Stop reason: SDUPTHER

## 2020-08-07 RX ORDER — VALSARTAN AND HYDROCHLOROTHIAZIDE 320; 25 MG/1; MG/1
TABLET, FILM COATED ORAL
Qty: 30 TAB | Refills: 0 | Status: SHIPPED | OUTPATIENT
Start: 2020-08-07 | End: 2020-08-20 | Stop reason: SDUPTHER

## 2020-08-07 RX ORDER — AMLODIPINE BESYLATE 5 MG/1
TABLET ORAL
Qty: 30 TAB | Refills: 0 | Status: SHIPPED | OUTPATIENT
Start: 2020-08-07 | End: 2020-08-20 | Stop reason: SDUPTHER

## 2020-08-20 ENCOUNTER — OFFICE VISIT (OUTPATIENT)
Dept: MEDICAL GROUP | Age: 50
End: 2020-08-20
Payer: COMMERCIAL

## 2020-08-20 VITALS
HEART RATE: 83 BPM | WEIGHT: 258 LBS | OXYGEN SATURATION: 98 % | BODY MASS INDEX: 36.94 KG/M2 | HEIGHT: 70 IN | TEMPERATURE: 98.4 F | DIASTOLIC BLOOD PRESSURE: 84 MMHG | SYSTOLIC BLOOD PRESSURE: 122 MMHG

## 2020-08-20 DIAGNOSIS — I10 ESSENTIAL HYPERTENSION, BENIGN: ICD-10-CM

## 2020-08-20 DIAGNOSIS — I10 ESSENTIAL HYPERTENSION: ICD-10-CM

## 2020-08-20 DIAGNOSIS — G62.9 NEUROPATHY: ICD-10-CM

## 2020-08-20 DIAGNOSIS — E78.2 MIXED HYPERLIPIDEMIA: ICD-10-CM

## 2020-08-20 DIAGNOSIS — Z23 NEED FOR VACCINATION: ICD-10-CM

## 2020-08-20 PROCEDURE — 90746 HEPB VACCINE 3 DOSE ADULT IM: CPT | Performed by: FAMILY MEDICINE

## 2020-08-20 PROCEDURE — 99214 OFFICE O/P EST MOD 30 MIN: CPT | Mod: 25 | Performed by: FAMILY MEDICINE

## 2020-08-20 PROCEDURE — 90471 IMMUNIZATION ADMIN: CPT | Performed by: FAMILY MEDICINE

## 2020-08-20 RX ORDER — METOPROLOL SUCCINATE 100 MG/1
100 TABLET, EXTENDED RELEASE ORAL DAILY
Qty: 90 TAB | Refills: 2 | Status: SHIPPED | OUTPATIENT
Start: 2020-08-20 | End: 2021-05-18

## 2020-08-20 RX ORDER — AMLODIPINE BESYLATE 5 MG/1
TABLET ORAL
Qty: 90 TAB | Refills: 2 | Status: SHIPPED | OUTPATIENT
Start: 2020-08-20 | End: 2020-11-20

## 2020-08-20 RX ORDER — VALSARTAN AND HYDROCHLOROTHIAZIDE 320; 25 MG/1; MG/1
TABLET, FILM COATED ORAL
Qty: 90 TAB | Refills: 2 | Status: SHIPPED | OUTPATIENT
Start: 2020-08-20 | End: 2021-05-18

## 2020-08-20 ASSESSMENT — FIBROSIS 4 INDEX: FIB4 SCORE: 0.73

## 2020-08-20 NOTE — PROGRESS NOTES
This medical record contains text that has been entered with the assistance of computer voice recognition and dictation software.  Therefore, it may contain unintended errors in text, spelling, punctuation, or grammar      Chief Complaint   Patient presents with   • Medication Refill     Pt. needs 90 days supplies to last 6 months          Carrol Goss is a 50 y.o. male here evaluation and management of: follow up      HPI:     1. Essential hypertension, benign  Amlodipine 5 mg p.o. daily  Metoprolol sustained release 100 mg p.o. daily  Losartan 320/25 mg p.o. daily  The  patient is on high intensity statin therapy and a baby aspirin.    Home BP readings--- not monitoring    Today, the patient has been tollerating the BP meds without any issues. The patient denies any lightheadedness, cough, chest pain shortness of breath or syncopal episodes.        2. Mixed hyperlipidemia  Atorvastatin 80 mg p.o. nightly    The patient had not had any medications prior to his lab draw.  So has been not at goal.  He states that Dr. Dos Santos did repeat labs which are still pending based on that we will make changes.    Results for CARROL GOSS (MRN [id number]) as of 8/20/2020 11:32   Ref. Range 1/27/2020 10:28   Cholesterol,Tot Latest Ref Range: 100 - 199 mg/dL 167   Triglycerides Latest Ref Range: 0 - 149 mg/dL 527 (H)   HDL Latest Ref Range: >=40 mg/dL 34 (A)   LDL Latest Ref Range: <100 mg/dL see below         3. Need for vaccination  Due for hepatitis B vaccination        5. Neuropathy  Patient has been taking gabapentin 300 mg 4 times daily.  It seems to be improving his neuropathy but so has the improved diabetic control.  His A1c went from over 14 to 7.2.      Results for CARROL GOSS (MRN [id number]) as of 8/20/2020 11:32   Ref. Range 4/23/2020 09:24 7/22/2020 08:47   Glycohemoglobin Latest Ref Range: 0.0 - 5.6 % 7.4 (A) 7.2 (A)         Current medicines (including changes today)  Current Outpatient  Medications   Medication Sig Dispense Refill   • Omeprazole 20 MG Tablet Delayed Release Dispersible Take  by mouth.     • metoprolol SR (TOPROL XL) 100 MG TABLET SR 24 HR Take 1 Tab by mouth every day. (NEED APPOINTMENT) 90 Tab 2   • amLODIPine (NORVASC) 5 MG Tab TAKE 1 TABLET EVERY DAY 90 Tab 2   • valsartan-hydrochlorothiazide (DIOVAN-HCT) 320-25 MG per tablet Take one tab po q24h 90 Tab 2   • Dulaglutide (TRULICITY) 1.5 MG/0.5ML Solution Pen-injector Inject 1.5 mg as instructed every 7 days. 1 PEN 3   • Continuous Blood Gluc Sensor (FREESTYLE DINA 14 DAY SENSOR) Misc USE 1 SENSOR EVERY 14 DAYS 3 Each 7   • metformin (GLUCOPHAGE) 1000 MG tablet Take 1 Tab by mouth 2 times a day, with meals. 180 Tab 3   • Empagliflozin (JARDIANCE) 25 MG Tab Take 25 mg by mouth every day. 90 Tab 0   • Continuous Blood Gluc  (FREESTYLE DINA READER) Device 1 Each by Does not apply route Continuous. 1 Device 0   • atorvastatin (LIPITOR) 80 MG tablet TAKE 1 TABLET DAILY IN THE EVENING (PATIENT TO BE SEEN) 90 Tab 2   • glipiZIDE (GLUCOTROL) 5 MG Tab Take 1 Tab by mouth 2 times a day. (Patient taking differently: Take 5 mg by mouth every day.) 60 Tab 2   • gabapentin (NEURONTIN) 300 MG Cap Take 4 pills in the AM and 2 pills at night (Patient taking differently: 1,200 mg 2 times a day. Take 4 pills in the AM and 4 pills at night) 540 Cap 3   • vitamin D3, cholecalciferol, 1000 UNIT Tab Take 3 Tabs by mouth every day. (Patient taking differently: Take 2,000 Units by mouth every day.) 100 Tab 3   • aspirin (ASA) 81 MG Chew Tab chewable tablet Take 81 mg by mouth every day. 100 Tab 11   • Blood Glucose Monitoring Suppl SUPPLIES MISC Test strips order: Test strips for One Touch Ultra Mini meter. Sig: use daily and prn ssx high or low sugar #100 RF x 3 100 Strip 3   • esomeprazole (NEXIUM) 20 MG capsule Take 1 Cap by mouth every morning before breakfast. (Patient not taking: Reported on 8/20/2020) 30 Cap 0   • cephALEXin (KEFLEX) 500  "MG Cap      • sildenafil citrate (VIAGRA) 100 MG tablet Take 1 Tab by mouth as needed for Erectile Dysfunction. (Patient not taking: Reported on 2020) 10 Tab 3     No current facility-administered medications for this visit.      He  has a past medical history of Diabetes (HCC), Hyperlipidemia, Hypertension, and Obesity.  He  has a past surgical history that includes av fistula revision () and uvulopharyngopalatoplasty ().  Social History     Tobacco Use   • Smoking status: Never Smoker   • Smokeless tobacco: Never Used   Substance Use Topics   • Alcohol use: Yes     Comment: rare   • Drug use: No     Social History     Social History Narrative   • Not on file     Family History   Problem Relation Age of Onset   • Cancer Mother    • Diabetes Paternal Grandmother      Family Status   Relation Name Status   • Mo   at age 68        CHF   • Fa  Alive   • Bro  Alive   • Bro  Alive   • Son  Alive   • Son  Alive   • PGMo  (Not Specified)         ROS    The pertinent  ROS findings can be seen in the HPI above.     All other systems reviewed and are negative     Objective:     /84 (BP Location: Left arm, Patient Position: Sitting, BP Cuff Size: Large adult)   Pulse 83   Temp 36.9 °C (98.4 °F) (Temporal)   Ht 1.778 m (5' 10\")   Wt 117 kg (258 lb)   SpO2 98%  Body mass index is 37.02 kg/m².      Physical Exam:    Constitutional: Alert, no distress.  Skin: no rashes  Eye: Equal, round and reactive, conjunctiva clear, lids normal.  ENMT: Lips without lesions, good dentition, oropharynx clear.  Neck: Trachea midline, no masses, no thyromegaly. No cervical or supraclavicular lymphadenopathy.  Respiratory: Unlabored respiratory effort, lungs clear to auscultation, no wheezes, no ronchi.  Cardiovascular: Normal S1, S2, no murmur, no edema  Abdomen: Soft, non-tender, no masses, no hepatosplenomegaly.        Assessment and Plan:   The following treatment plan was discussed      1. Essential " hypertension, benign    Patient has been stable with current management  We will make no changes for now    - metoprolol SR (TOPROL XL) 100 MG TABLET SR 24 HR; Take 1 Tab by mouth every day. (NEED APPOINTMENT)  Dispense: 90 Tab; Refill: 2  - amLODIPine (NORVASC) 5 MG Tab; TAKE 1 TABLET EVERY DAY  Dispense: 90 Tab; Refill: 2    2. Mixed hyperlipidemia    Repeat labs  Depending on new labs we may have to add fenofibrate or gemfibrozil    - valsartan-hydrochlorothiazide (DIOVAN-HCT) 320-25 MG per tablet; Take one tab po q24h  Dispense: 90 Tab; Refill: 2    3. Need for vaccination    Vaccine was administered today without adverse event.    - Hepatitis B Vaccine Adult 20+      4. Neuropathy  Patient has been stable with current management  We will make no changes for now        Instructed to Follow up in clinic or ER for worsening symptoms, difficulty breathing, lack of expected recovery, or should new symptoms or problems arise.    Followup: Return in about 6 months (around 2/20/2021) for Reevaluation, labs.       Once again this medical record contains text that has been entered with the assistance of computer voice recognition and dictation software.  Therefore, it may contain unintended errors in text, spelling, punctuation, or grammar

## 2020-09-03 DIAGNOSIS — E11.9 TYPE 2 DIABETES MELLITUS WITHOUT COMPLICATION, WITHOUT LONG-TERM CURRENT USE OF INSULIN (HCC): ICD-10-CM

## 2020-09-04 RX ORDER — EMPAGLIFLOZIN 25 MG/1
TABLET, FILM COATED ORAL
Qty: 90 TAB | Refills: 3 | Status: SHIPPED | OUTPATIENT
Start: 2020-09-04 | End: 2021-08-30

## 2020-09-23 RX ORDER — DULAGLUTIDE 1.5 MG/.5ML
INJECTION, SOLUTION SUBCUTANEOUS
Qty: 2 ML | Refills: 12 | Status: SHIPPED | OUTPATIENT
Start: 2020-09-23 | End: 2021-08-12

## 2020-11-04 RX ORDER — ATORVASTATIN CALCIUM 80 MG/1
TABLET, FILM COATED ORAL
Qty: 90 TAB | Refills: 3 | Status: SHIPPED | OUTPATIENT
Start: 2020-11-04 | End: 2021-10-28

## 2021-01-28 ENCOUNTER — OFFICE VISIT (OUTPATIENT)
Dept: MEDICAL GROUP | Age: 51
End: 2021-01-28
Payer: COMMERCIAL

## 2021-01-28 VITALS
TEMPERATURE: 97.5 F | DIASTOLIC BLOOD PRESSURE: 82 MMHG | HEART RATE: 91 BPM | SYSTOLIC BLOOD PRESSURE: 122 MMHG | BODY MASS INDEX: 36.65 KG/M2 | HEIGHT: 70 IN | OXYGEN SATURATION: 93 % | WEIGHT: 256 LBS

## 2021-01-28 DIAGNOSIS — M79.605 PAIN IN BOTH LOWER EXTREMITIES: ICD-10-CM

## 2021-01-28 DIAGNOSIS — M79.604 PAIN IN BOTH LOWER EXTREMITIES: ICD-10-CM

## 2021-01-28 DIAGNOSIS — E11.9 DIABETES MELLITUS WITHOUT COMPLICATION (HCC): ICD-10-CM

## 2021-01-28 DIAGNOSIS — E55.9 VITAMIN D DEFICIENCY: ICD-10-CM

## 2021-01-28 DIAGNOSIS — Z00.00 ANNUAL PHYSICAL EXAM: ICD-10-CM

## 2021-01-28 DIAGNOSIS — I10 ESSENTIAL HYPERTENSION: ICD-10-CM

## 2021-01-28 DIAGNOSIS — E78.2 MIXED HYPERLIPIDEMIA: ICD-10-CM

## 2021-01-28 LAB
HBA1C MFR BLD: 7.3 % (ref 0–5.6)
INT CON NEG: ABNORMAL
INT CON POS: ABNORMAL

## 2021-01-28 PROCEDURE — 99396 PREV VISIT EST AGE 40-64: CPT | Performed by: FAMILY MEDICINE

## 2021-01-28 PROCEDURE — 83036 HEMOGLOBIN GLYCOSYLATED A1C: CPT | Performed by: FAMILY MEDICINE

## 2021-01-28 RX ORDER — MELATONIN
2000 DAILY
Qty: 90 TAB | Refills: 2
Start: 2021-01-28 | End: 2023-12-13

## 2021-01-28 RX ORDER — GLIPIZIDE 5 MG/1
5 TABLET ORAL DAILY
Qty: 90 TAB | Refills: 0
Start: 2021-01-28 | End: 2021-03-23 | Stop reason: SDUPTHER

## 2021-01-28 RX ORDER — GABAPENTIN 300 MG/1
1200 CAPSULE ORAL 2 TIMES DAILY
Qty: 180 CAP | Refills: 3
Start: 2021-01-28 | End: 2021-03-23 | Stop reason: SDUPTHER

## 2021-01-28 ASSESSMENT — FIBROSIS 4 INDEX: FIB4 SCORE: 0.73

## 2021-01-28 ASSESSMENT — PATIENT HEALTH QUESTIONNAIRE - PHQ9: CLINICAL INTERPRETATION OF PHQ2 SCORE: 0

## 2021-01-28 NOTE — PROGRESS NOTES
Subjective:     CC:   Chief Complaint   Patient presents with   • Lab Results       HPI:   Andrez Sanchez is a 50 y.o. male who presents for annual exam    Last Colorectal Cancer Screening: NA  Last Tdap: 2014  Received HPV series: Aged out  Hx STDs: No    Exercise: no regular exercise, sedentary  Diet: regular, does drink rootbeer and double chocolate Mocha      He  has a past medical history of Diabetes ([facility]), Hyperlipidemia, Hypertension, and Obesity.  He  has a past surgical history that includes av fistula revision (1973) and uvulopharyngopalatoplasty (2006).    Family History   Problem Relation Age of Onset   • Cancer Mother    • Diabetes Paternal Grandmother      Social History     Tobacco Use   • Smoking status: Never Smoker   • Smokeless tobacco: Never Used   Substance Use Topics   • Alcohol use: Yes     Comment: rare   • Drug use: No     He  reports being sexually active and has had partner(s) who are Female. He reports using the following method of birth control/protection: Condom.    Patient Active Problem List    Diagnosis Date Noted   • Diabetes mellitus type II, controlled ([facility]) 07/22/2020   • Long term (current) use of oral hypoglycemic drugs 07/22/2020   • Uncontrolled type 2 diabetes mellitus with hyperglycemia ([facility]) 02/06/2020   • Neuropathy 01/23/2020   • Benign prostatic hyperplasia without lower urinary tract symptoms 01/23/2020   • Obesity (BMI 30-39.9) 01/03/2018   • Pain in both lower extremities 11/06/2017   • Erectile dysfunction 03/08/2017   • Nonspecific abnormal electrocardiogram (ECG) (EKG) - 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 90 Tab 0   • gabapentin (NEURONTIN) 300 MG Cap Take 4 Caps by mouth 2 times a day. Take 4 pills in the AM and 4 pills at night 180 Cap 3   • vitamin D (CHOLECALCIFEROL) 1000 Unit Tab Take 2 Tabs by mouth every day. 90 Tab 2   • atorvastatin (LIPITOR) 80 MG tablet TAKE 1 TABLET DAILY IN THE EVENING (DUE TO BE SEEN) 90 Tab 3   • TRULICITY 1.5 MG/0.5ML Solution Pen-injector INJECT 1.5 MG UNDER THE SKIN EVERY 7 DAYS AS INSTRUCTED 2 mL 12   • JARDIANCE 25 MG Tab TAKE 1 TABLET EVERY DAY 90 Tab 3   • Omeprazole 20 MG Tablet Delayed Release Dispersible Take  by mouth.     • metoprolol SR (TOPROL XL) 100 MG TABLET SR 24 HR Take 1 Tab by mouth every day. (NEED APPOINTMENT) 90 Tab 2   • valsartan-hydrochlorothiazide (DIOVAN-HCT) 320-25 MG per tablet Take one tab po q24h 90 Tab 2   • Continuous Blood Gluc Sensor (FREESTYLE DINA 14 DAY SENSOR) Misc USE 1 SENSOR EVERY 14 DAYS 3 Each 7   • metformin (GLUCOPHAGE) 1000 MG tablet Take 1 Tab by mouth 2 times a day, with meals. 180 Tab 3   • Continuous Blood Gluc  (FREESTYLE DINA READER) Device 1 Each by Does not apply route Continuous. 1 Device 0   • aspirin (ASA) 81 MG Chew Tab chewable tablet Take 81 mg by mouth every day. 100 Tab 11     No current facility-administered medications for this visit.      No Known Allergies    Review of Systems   Constitutional: Negative for fever, chills and malaise/fatigue.   HENT: Negative for congestion.    Eyes: Negative for pain.   Respiratory: Negative for cough and shortness of breath.    Cardiovascular: Negative for chest pain and leg swelling.   Gastrointestinal: Negative for nausea, vomiting, abdominal pain and diarrhea.   Genitourinary: Negative for dysuria and hematuria.   Skin: Negative for rash.   Neurological: Negative for dizziness, focal weakness and headaches.   Endo/Heme/Allergies: Does not bruise/bleed easily.   Psychiatric/Behavioral: Negative for depression.  The patient is not nervous/anxious.      Objective:   BP  "122/82 (BP Location: Right arm, Patient Position: Sitting, BP Cuff Size: Large adult)   Pulse 91   Temp 36.4 °C (97.5 °F) (Temporal)   Ht 1.778 m (5' 10\")   Wt 116 kg (256 lb)   SpO2 93%   BMI 36.73 kg/m²      Wt Readings from Last 4 Encounters:   01/28/21 116 kg (256 lb)   08/20/20 117 kg (258 lb)   07/22/20 116 kg (256 lb 4.8 oz)   04/23/20 117 kg (258 lb)           Physical Exam:  Constitutional: Well-developed and well-nourished. Not diaphoretic. No distress.   Skin: Skin is warm and dry. No rash noted.  Head: Atraumatic without lesions.  Eyes: Conjunctivae and extraocular motions are normal. Pupils are equal, round, and reactive to light. No scleral icterus.   Ears:  External ears unremarkable. Tympanic membranes clear and intact.  Nose: Nares patent. Septum midline. Turbinates without erythema nor edema. No discharge.   Mouth/Throat: Tongue normal. Oropharynx is clear and moist. Posterior pharynx without erythema or exudates.  Neck: Supple, trachea midline. Normal range of motion. No thyromegaly present. No lymphadenopathy--cervical or supraclavicular.  Cardiovascular: Regular rate and rhythm, S1 and S2 without murmur, rubs, or gallops.    Respiratory: Effort normal. Clear to auscultation throughout. No adventitious sounds.   Abdomen: Soft, non tender, and without distention. Active bowel sounds in all four quadrants. No rebound, guarding, masses or HSM.    Extremities: No cyanosis, clubbing, erythema, nor edema. Distal pulses intact and symmetric.   Musculoskeletal: All major joints AROM full in all directions without pain.  Neurological: Alert and oriented x 3. Grossly non-focal. Strength and sensation grossly intact. DTRs 2+/3 and symmetric.   Psychiatric:  Behavior, mood, and affect are appropriate.      Assessment and Plan:     1. Annual physical exam  - Comp Metabolic Panel; Future  - CBC WITHOUT DIFFERENTIAL; Future  - Lipid Profile; Future  - PROSTATE SPECIFIC AG DIAGNOSTIC; Future  - TSH+FREE " T4  - T3 FREE; Future  - MICROALBUMIN CREAT RATIO URINE; Future    2. Essential hypertension  Patient has been stable with current management  We will make no changes for now    3. Mixed hyperlipidemia  Patient has been stable with current management  We will make no changes for now    4. Diabetes mellitus without complication (HCC)    Continue to follow with Dr. Castaneda    - HEMOGLOBIN A1C; Future  - POCT  A1C  - glipiZIDE (GLUCOTROL) 5 MG Tab; Take 1 Tab by mouth every day.  Dispense: 90 Tab; Refill: 0    5. Pain in both lower extremities  - gabapentin (NEURONTIN) 300 MG Cap; Take 4 Caps by mouth 2 times a day. Take 4 pills in the AM and 4 pills at night  Dispense: 180 Cap; Refill: 3    6. Vitamin D deficiency  - vitamin D (CHOLECALCIFEROL) 1000 Unit Tab; Take 2 Tabs by mouth every day.  Dispense: 90 Tab; Refill: 2      Health maintenance:     Labs per orders  Immunizations per orders  Patient counseled about skin care, diet, supplements, and exercise.  Discussed diet and exercise     Follow-up: Return in about 3 months (around 4/28/2021) for Reevaluation, labs.

## 2021-01-28 NOTE — RESULT ENCOUNTER NOTE
I discussed results and plan with patient, who stated understanding.       Pablo Cordova MD  [employer], [address] [address]

## 2021-02-05 ENCOUNTER — TELEPHONE (OUTPATIENT)
Dept: MEDICAL GROUP | Age: 51
End: 2021-02-05

## 2021-02-05 DIAGNOSIS — Z23 NEED FOR VACCINATION: ICD-10-CM

## 2021-02-05 NOTE — TELEPHONE ENCOUNTER
Patient is on the MA Schedule 02/12/2021 for Hep B  vaccine/injection.    SPECIFIC Action To Be Taken: Orders pending, please sign.

## 2021-02-12 ENCOUNTER — NON-PROVIDER VISIT (OUTPATIENT)
Dept: MEDICAL GROUP | Age: 51
End: 2021-02-12
Payer: COMMERCIAL

## 2021-02-12 DIAGNOSIS — Z23 NEED FOR VACCINATION: ICD-10-CM

## 2021-02-12 PROCEDURE — 90746 HEPB VACCINE 3 DOSE ADULT IM: CPT | Performed by: FAMILY MEDICINE

## 2021-02-12 PROCEDURE — 90471 IMMUNIZATION ADMIN: CPT | Performed by: FAMILY MEDICINE

## 2021-02-12 NOTE — PROGRESS NOTES
"Andrez Sanchez is a 50 y.o. male here for a non-provider visit for:   HEPATITIS B 3 of 3    Reason for immunization: continue or complete series started at the office  Immunization records indicate need for vaccine: Yes, confirmed with Epic  Minimum interval has been met for this vaccine: Yes  ABN completed: Not Indicated    Order and dose verified by: N/A  VIS Dated  7/2016 was given to patient: No (pt declined)  All IAC Questionnaire questions were answered \"No.\"    Patient tolerated injection and no adverse effects were observed or reported: Yes    Pt scheduled for next dose in series: Not Indicated    "

## 2021-03-24 DIAGNOSIS — E11.65 UNCONTROLLED TYPE 2 DIABETES MELLITUS WITH HYPERGLYCEMIA (HCC): ICD-10-CM

## 2021-03-24 RX ORDER — FLASH GLUCOSE SENSOR
1 KIT MISCELLANEOUS CONTINUOUS
Qty: 1 EACH | Refills: 5 | Status: SHIPPED | OUTPATIENT
Start: 2021-03-24 | End: 2022-06-01

## 2021-03-24 RX ORDER — FLASH GLUCOSE SENSOR
KIT MISCELLANEOUS
Qty: 3 EACH | Refills: 7 | Status: SHIPPED | OUTPATIENT
Start: 2021-03-24 | End: 2022-06-01

## 2021-04-06 LAB
ALBUMIN SERPL-MCNC: 4.3 G/DL (ref 4–5)
ALBUMIN/CREAT UR: 7 MG/G CREAT (ref 0–29)
ALBUMIN/GLOB SERPL: 1.6 {RATIO} (ref 1.2–2.2)
ALP SERPL-CCNC: 75 IU/L (ref 39–117)
ALT SERPL-CCNC: 38 IU/L (ref 0–44)
AST SERPL-CCNC: 20 IU/L (ref 0–40)
BILIRUB SERPL-MCNC: 0.7 MG/DL (ref 0–1.2)
BUN SERPL-MCNC: 17 MG/DL (ref 6–24)
BUN/CREAT SERPL: 15 (ref 9–20)
CALCIUM SERPL-MCNC: 9.3 MG/DL (ref 8.7–10.2)
CHLORIDE SERPL-SCNC: 104 MMOL/L (ref 96–106)
CHOLEST SERPL-MCNC: 125 MG/DL (ref 100–199)
CO2 SERPL-SCNC: 25 MMOL/L (ref 20–29)
CREAT SERPL-MCNC: 1.14 MG/DL (ref 0.76–1.27)
CREAT UR-MCNC: 128.4 MG/DL
ERYTHROCYTE [DISTWIDTH] IN BLOOD BY AUTOMATED COUNT: 12.8 % (ref 11.6–15.4)
GLOBULIN SER CALC-MCNC: 2.7 G/DL (ref 1.5–4.5)
GLUCOSE SERPL-MCNC: 144 MG/DL (ref 65–99)
HBA1C MFR BLD: 7.4 % (ref 4.8–5.6)
HCT VFR BLD AUTO: 46.5 % (ref 37.5–51)
HDLC SERPL-MCNC: 34 MG/DL
HGB BLD-MCNC: 15.8 G/DL (ref 13–17.7)
LABORATORY COMMENT REPORT: ABNORMAL
LDLC SERPL CALC-MCNC: 49 MG/DL (ref 0–99)
MCH RBC QN AUTO: 31.3 PG (ref 26.6–33)
MCHC RBC AUTO-ENTMCNC: 34 G/DL (ref 31.5–35.7)
MCV RBC AUTO: 92 FL (ref 79–97)
MICROALBUMIN UR-MCNC: 9.5 UG/ML
NRBC BLD AUTO-RTO: NORMAL %
POTASSIUM SERPL-SCNC: 3.9 MMOL/L (ref 3.5–5.2)
PROT SERPL-MCNC: 7 G/DL (ref 6–8.5)
PSA SERPL-MCNC: 3.2 NG/ML (ref 0–4)
RBC # BLD AUTO: 5.04 X10E6/UL (ref 4.14–5.8)
SODIUM SERPL-SCNC: 142 MMOL/L (ref 134–144)
T3FREE SERPL-MCNC: 3.4 PG/ML (ref 2–4.4)
T4 FREE SERPL-MCNC: 1.35 NG/DL (ref 0.82–1.77)
TRIGL SERPL-MCNC: 269 MG/DL (ref 0–149)
TSH SERPL DL<=0.005 MIU/L-ACNC: 1.47 UIU/ML (ref 0.45–4.5)
VLDLC SERPL CALC-MCNC: 42 MG/DL (ref 5–40)
WBC # BLD AUTO: 5.4 X10E3/UL (ref 3.4–10.8)

## 2021-05-16 DIAGNOSIS — E78.2 MIXED HYPERLIPIDEMIA: ICD-10-CM

## 2021-05-16 DIAGNOSIS — I10 ESSENTIAL HYPERTENSION, BENIGN: ICD-10-CM

## 2021-05-18 RX ORDER — AMLODIPINE BESYLATE 5 MG/1
TABLET ORAL
Qty: 90 TABLET | Refills: 3 | Status: SHIPPED | OUTPATIENT
Start: 2021-05-18 | End: 2022-05-16 | Stop reason: SDUPTHER

## 2021-05-18 RX ORDER — VALSARTAN AND HYDROCHLOROTHIAZIDE 320; 25 MG/1; MG/1
TABLET, FILM COATED ORAL
Qty: 90 TABLET | Refills: 3 | Status: SHIPPED | OUTPATIENT
Start: 2021-05-18 | End: 2022-05-16 | Stop reason: SDUPTHER

## 2021-05-18 RX ORDER — METOPROLOL SUCCINATE 100 MG/1
TABLET, EXTENDED RELEASE ORAL
Qty: 90 TABLET | Refills: 3 | Status: SHIPPED | OUTPATIENT
Start: 2021-05-18 | End: 2022-05-16 | Stop reason: SDUPTHER

## 2021-05-21 DIAGNOSIS — E11.9 TYPE 2 DIABETES MELLITUS WITHOUT COMPLICATION, WITHOUT LONG-TERM CURRENT USE OF INSULIN (HCC): ICD-10-CM

## 2021-08-12 RX ORDER — DULAGLUTIDE 1.5 MG/.5ML
INJECTION, SOLUTION SUBCUTANEOUS
Qty: 2 ML | Refills: 12 | Status: SHIPPED | OUTPATIENT
Start: 2021-08-12 | End: 2022-06-01 | Stop reason: SDUPTHER

## 2021-08-29 DIAGNOSIS — E11.9 TYPE 2 DIABETES MELLITUS WITHOUT COMPLICATION, WITHOUT LONG-TERM CURRENT USE OF INSULIN (HCC): ICD-10-CM

## 2021-08-30 RX ORDER — EMPAGLIFLOZIN 25 MG/1
TABLET, FILM COATED ORAL
Qty: 30 TABLET | Refills: 0 | Status: SHIPPED | OUTPATIENT
Start: 2021-08-30 | End: 2022-03-22 | Stop reason: SDUPTHER

## 2021-10-28 RX ORDER — ATORVASTATIN CALCIUM 80 MG/1
TABLET, FILM COATED ORAL
Qty: 90 TABLET | Refills: 3 | Status: SHIPPED | OUTPATIENT
Start: 2021-10-28 | End: 2022-05-16 | Stop reason: SDUPTHER

## 2021-11-08 DIAGNOSIS — M79.604 PAIN IN BOTH LOWER EXTREMITIES: ICD-10-CM

## 2021-11-08 DIAGNOSIS — M79.605 PAIN IN BOTH LOWER EXTREMITIES: ICD-10-CM

## 2021-11-09 RX ORDER — GABAPENTIN 300 MG/1
CAPSULE ORAL
Qty: 180 CAPSULE | Refills: 14 | Status: SHIPPED | OUTPATIENT
Start: 2021-11-09 | End: 2022-06-01 | Stop reason: SDUPTHER

## 2022-01-19 DIAGNOSIS — E11.9 DIABETES MELLITUS WITHOUT COMPLICATION (HCC): ICD-10-CM

## 2022-01-20 RX ORDER — GLIPIZIDE 5 MG/1
TABLET ORAL
Qty: 30 TABLET | Refills: 0 | Status: SHIPPED | OUTPATIENT
Start: 2022-01-20 | End: 2022-03-22 | Stop reason: SDUPTHER

## 2022-03-14 DIAGNOSIS — E11.9 DIABETES MELLITUS WITHOUT COMPLICATION (HCC): ICD-10-CM

## 2022-03-14 RX ORDER — GLIPIZIDE 5 MG/1
5 TABLET ORAL DAILY
Qty: 30 TABLET | Refills: 0 | OUTPATIENT
Start: 2022-03-14

## 2022-03-14 NOTE — TELEPHONE ENCOUNTER
Received request via: Pharmacy    Was the patient seen in the last year in this department? No    Does the patient have an active prescription (recently filled or refills available) for medication(s) requested? No

## 2022-03-22 DIAGNOSIS — E11.9 DIABETES MELLITUS WITHOUT COMPLICATION (HCC): ICD-10-CM

## 2022-03-22 DIAGNOSIS — E11.9 TYPE 2 DIABETES MELLITUS WITHOUT COMPLICATION, WITHOUT LONG-TERM CURRENT USE OF INSULIN (HCC): ICD-10-CM

## 2022-03-22 RX ORDER — GLIPIZIDE 5 MG/1
5 TABLET ORAL DAILY
Qty: 30 TABLET | Refills: 0 | Status: SHIPPED | OUTPATIENT
Start: 2022-03-22 | End: 2022-05-16 | Stop reason: SDUPTHER

## 2022-03-22 RX ORDER — EMPAGLIFLOZIN 25 MG/1
1 TABLET, FILM COATED ORAL
Qty: 30 TABLET | Refills: 0 | Status: SHIPPED | OUTPATIENT
Start: 2022-03-22 | End: 2022-05-16 | Stop reason: SDUPTHER

## 2022-05-14 ENCOUNTER — PATIENT MESSAGE (OUTPATIENT)
Dept: MEDICAL GROUP | Age: 52
End: 2022-05-14
Payer: COMMERCIAL

## 2022-05-14 DIAGNOSIS — E11.9 TYPE 2 DIABETES MELLITUS WITHOUT COMPLICATION, WITHOUT LONG-TERM CURRENT USE OF INSULIN (HCC): ICD-10-CM

## 2022-05-14 DIAGNOSIS — E11.9 DIABETES MELLITUS WITHOUT COMPLICATION (HCC): ICD-10-CM

## 2022-05-14 DIAGNOSIS — E78.2 MIXED HYPERLIPIDEMIA: ICD-10-CM

## 2022-05-14 DIAGNOSIS — I10 ESSENTIAL HYPERTENSION, BENIGN: ICD-10-CM

## 2022-05-16 NOTE — PATIENT COMMUNICATION
Received request via: Patient    Was the patient seen in the last year in this department? No    Does the patient have an active prescription (recently filled or refills available) for medication(s) requested? No     Pt has appt with you on 6/1, but is out of medications. 30 day refill pended.

## 2022-05-17 RX ORDER — METOPROLOL SUCCINATE 100 MG/1
100 TABLET, EXTENDED RELEASE ORAL DAILY
Qty: 30 TABLET | Refills: 0 | Status: SHIPPED | OUTPATIENT
Start: 2022-05-17 | End: 2022-06-01 | Stop reason: SDUPTHER

## 2022-05-17 RX ORDER — GLIPIZIDE 5 MG/1
5 TABLET ORAL DAILY
Qty: 30 TABLET | Refills: 0 | Status: SHIPPED | OUTPATIENT
Start: 2022-05-17 | End: 2022-06-01 | Stop reason: SDUPTHER

## 2022-05-17 RX ORDER — ATORVASTATIN CALCIUM 80 MG/1
TABLET, FILM COATED ORAL
Qty: 30 TABLET | Refills: 0 | Status: SHIPPED | OUTPATIENT
Start: 2022-05-17 | End: 2022-06-01 | Stop reason: SDUPTHER

## 2022-05-17 RX ORDER — VALSARTAN AND HYDROCHLOROTHIAZIDE 320; 25 MG/1; MG/1
TABLET, FILM COATED ORAL
Qty: 30 TABLET | Refills: 1 | Status: SHIPPED | OUTPATIENT
Start: 2022-05-17 | End: 2022-06-01 | Stop reason: SDUPTHER

## 2022-05-17 RX ORDER — AMLODIPINE BESYLATE 5 MG/1
5 TABLET ORAL DAILY
Qty: 30 TABLET | Refills: 0 | Status: SHIPPED | OUTPATIENT
Start: 2022-05-17 | End: 2022-06-01 | Stop reason: SDUPTHER

## 2022-05-17 RX ORDER — EMPAGLIFLOZIN 25 MG/1
1 TABLET, FILM COATED ORAL
Qty: 30 TABLET | Refills: 0 | Status: SHIPPED | OUTPATIENT
Start: 2022-05-17 | End: 2022-06-01 | Stop reason: SDUPTHER

## 2022-06-01 ENCOUNTER — TELEPHONE (OUTPATIENT)
Dept: MEDICAL GROUP | Age: 52
End: 2022-06-01

## 2022-06-01 ENCOUNTER — OFFICE VISIT (OUTPATIENT)
Dept: MEDICAL GROUP | Age: 52
End: 2022-06-01
Payer: COMMERCIAL

## 2022-06-01 VITALS
TEMPERATURE: 98 F | WEIGHT: 266 LBS | BODY MASS INDEX: 35.25 KG/M2 | HEART RATE: 87 BPM | DIASTOLIC BLOOD PRESSURE: 80 MMHG | OXYGEN SATURATION: 99 % | SYSTOLIC BLOOD PRESSURE: 118 MMHG | HEIGHT: 73 IN | RESPIRATION RATE: 16 BRPM

## 2022-06-01 DIAGNOSIS — Z12.12 SCREENING FOR COLORECTAL CANCER: ICD-10-CM

## 2022-06-01 DIAGNOSIS — E55.9 VITAMIN D DEFICIENCY: ICD-10-CM

## 2022-06-01 DIAGNOSIS — M79.605 PAIN IN BOTH LOWER EXTREMITIES: ICD-10-CM

## 2022-06-01 DIAGNOSIS — E11.9 TYPE 2 DIABETES MELLITUS WITHOUT COMPLICATION, WITHOUT LONG-TERM CURRENT USE OF INSULIN (HCC): ICD-10-CM

## 2022-06-01 DIAGNOSIS — E78.2 MIXED HYPERLIPIDEMIA: ICD-10-CM

## 2022-06-01 DIAGNOSIS — Z12.11 SCREENING FOR COLORECTAL CANCER: ICD-10-CM

## 2022-06-01 DIAGNOSIS — Z23 NEED FOR VACCINATION: ICD-10-CM

## 2022-06-01 DIAGNOSIS — Z00.00 ANNUAL PHYSICAL EXAM: ICD-10-CM

## 2022-06-01 DIAGNOSIS — I10 ESSENTIAL HYPERTENSION, BENIGN: ICD-10-CM

## 2022-06-01 DIAGNOSIS — M79.604 PAIN IN BOTH LOWER EXTREMITIES: ICD-10-CM

## 2022-06-01 DIAGNOSIS — E11.9 DIABETES MELLITUS WITHOUT COMPLICATION (HCC): ICD-10-CM

## 2022-06-01 LAB
HBA1C MFR BLD: 8 % (ref 0–5.6)
INT CON NEG: NEGATIVE
INT CON POS: POSITIVE

## 2022-06-01 PROCEDURE — 90677 PCV20 VACCINE IM: CPT | Performed by: FAMILY MEDICINE

## 2022-06-01 PROCEDURE — 90471 IMMUNIZATION ADMIN: CPT | Performed by: FAMILY MEDICINE

## 2022-06-01 PROCEDURE — 99396 PREV VISIT EST AGE 40-64: CPT | Mod: 25 | Performed by: FAMILY MEDICINE

## 2022-06-01 PROCEDURE — 83036 HEMOGLOBIN GLYCOSYLATED A1C: CPT | Performed by: FAMILY MEDICINE

## 2022-06-01 RX ORDER — ATORVASTATIN CALCIUM 80 MG/1
TABLET, FILM COATED ORAL
Qty: 90 TABLET | Refills: 2 | Status: SHIPPED | OUTPATIENT
Start: 2022-06-01 | End: 2023-05-10

## 2022-06-01 RX ORDER — DULAGLUTIDE 1.5 MG/.5ML
INJECTION, SOLUTION SUBCUTANEOUS
Qty: 2 ML | Refills: 5 | Status: SHIPPED | OUTPATIENT
Start: 2022-06-01 | End: 2022-06-01

## 2022-06-01 RX ORDER — GLIPIZIDE 5 MG/1
5 TABLET ORAL DAILY
Qty: 90 TABLET | Refills: 2 | Status: SHIPPED | OUTPATIENT
Start: 2022-06-01 | End: 2023-03-01

## 2022-06-01 RX ORDER — MELATONIN
2000 DAILY
Qty: 90 TABLET | Refills: 2 | Status: CANCELLED | OUTPATIENT
Start: 2022-06-01

## 2022-06-01 RX ORDER — VALSARTAN AND HYDROCHLOROTHIAZIDE 320; 25 MG/1; MG/1
TABLET, FILM COATED ORAL
Qty: 30 TABLET | Refills: 1 | Status: CANCELLED | OUTPATIENT
Start: 2022-06-01

## 2022-06-01 RX ORDER — AMLODIPINE BESYLATE 5 MG/1
5 TABLET ORAL DAILY
Qty: 30 TABLET | Refills: 0 | Status: CANCELLED | OUTPATIENT
Start: 2022-06-01

## 2022-06-01 RX ORDER — VALSARTAN AND HYDROCHLOROTHIAZIDE 320; 25 MG/1; MG/1
TABLET, FILM COATED ORAL
Qty: 90 TABLET | Refills: 1 | Status: SHIPPED | OUTPATIENT
Start: 2022-06-01 | End: 2023-02-14

## 2022-06-01 RX ORDER — DULAGLUTIDE 1.5 MG/.5ML
INJECTION, SOLUTION SUBCUTANEOUS
Qty: 2 ML | Refills: 12 | Status: CANCELLED | OUTPATIENT
Start: 2022-06-01

## 2022-06-01 RX ORDER — AMLODIPINE BESYLATE 5 MG/1
5 TABLET ORAL DAILY
Qty: 90 TABLET | Refills: 2 | Status: SHIPPED | OUTPATIENT
Start: 2022-06-01 | End: 2023-03-01

## 2022-06-01 RX ORDER — GABAPENTIN 300 MG/1
CAPSULE ORAL
Qty: 180 CAPSULE | Refills: 14 | Status: CANCELLED | OUTPATIENT
Start: 2022-06-01

## 2022-06-01 RX ORDER — ATORVASTATIN CALCIUM 80 MG/1
TABLET, FILM COATED ORAL
Qty: 30 TABLET | Refills: 0 | Status: CANCELLED | OUTPATIENT
Start: 2022-06-01

## 2022-06-01 RX ORDER — METOPROLOL SUCCINATE 100 MG/1
100 TABLET, EXTENDED RELEASE ORAL DAILY
Qty: 90 TABLET | Refills: 2 | Status: SHIPPED | OUTPATIENT
Start: 2022-06-01 | End: 2023-03-01

## 2022-06-01 RX ORDER — GLIPIZIDE 5 MG/1
5 TABLET ORAL DAILY
Qty: 30 TABLET | Refills: 0 | Status: CANCELLED | OUTPATIENT
Start: 2022-06-01

## 2022-06-01 RX ORDER — EMPAGLIFLOZIN 25 MG/1
1 TABLET, FILM COATED ORAL
Qty: 30 TABLET | Refills: 0 | Status: CANCELLED | OUTPATIENT
Start: 2022-06-01

## 2022-06-01 RX ORDER — SEMAGLUTIDE 1.34 MG/ML
INJECTION, SOLUTION SUBCUTANEOUS
Qty: 1 ML | Refills: 5 | Status: SHIPPED | OUTPATIENT
Start: 2022-06-01 | End: 2023-03-02 | Stop reason: SDUPTHER

## 2022-06-01 RX ORDER — EMPAGLIFLOZIN 25 MG/1
1 TABLET, FILM COATED ORAL
Qty: 90 TABLET | Refills: 2 | Status: SHIPPED | OUTPATIENT
Start: 2022-06-01 | End: 2023-03-01

## 2022-06-01 RX ORDER — GABAPENTIN 300 MG/1
CAPSULE ORAL
Qty: 180 CAPSULE | Refills: 14 | Status: SHIPPED | OUTPATIENT
Start: 2022-06-01 | End: 2023-06-04 | Stop reason: SDUPTHER

## 2022-06-01 RX ORDER — ASPIRIN 81 MG/1
81 TABLET, CHEWABLE ORAL DAILY
Qty: 100 TABLET | Refills: 11 | Status: SHIPPED | OUTPATIENT
Start: 2022-06-01 | End: 2023-08-25

## 2022-06-01 RX ORDER — METOPROLOL SUCCINATE 100 MG/1
100 TABLET, EXTENDED RELEASE ORAL DAILY
Qty: 30 TABLET | Refills: 0 | Status: CANCELLED | OUTPATIENT
Start: 2022-06-01

## 2022-06-01 ASSESSMENT — PATIENT HEALTH QUESTIONNAIRE - PHQ9: CLINICAL INTERPRETATION OF PHQ2 SCORE: 0

## 2022-06-01 NOTE — PROGRESS NOTES
Subjective:     CC:   Chief Complaint   Patient presents with   • Annual Wellness Visit       HPI:   Andrez Sanchez is a 51 y.o. male who presents for the following    1. Annual physical exam  See below    2. Type 2 diabetes mellitus without complication, without long-term current use of insulin (HCC)  Jardiance 25 mg p.o. daily  Glipizide 5 mg p.o. daily  Metformin 1000 mg p.o. twice daily  Trulicity 1.5 mg subcu q. 14 days    Patient states that he has not been doing well with diet control because he has been stress related.  His father's been diagnosed with stage IV colon cancer.      3. Screening for colorectal cancer  Due for colon cancer screening    4. Need for vaccination  Due for PCV 20      Last Colorectal Cancer Screening: overdue  Last Tdap: UTD  Received HPV series: Aged out  Hx STDs: No    Exercise: no regular exercise, sedentary  Diet: regular      He  has a past medical history of Diabetes (HCC), Hyperlipidemia, Hypertension, and Obesity.  He  has a past surgical history that includes av fistula revision (1973) and uvulopharyngopalatoplasty (2006).    Family History   Problem Relation Age of Onset   • Cancer Mother    • Diabetes Paternal Grandmother      Social History     Tobacco Use   • Smoking status: Never Smoker   • Smokeless tobacco: Never Used   Vaping Use   • Vaping Use: Never used   Substance Use Topics   • Alcohol use: Yes     Comment: rare   • Drug use: No     He  reports being sexually active and has had partner(s) who are female. He reports using the following method of birth control/protection: Condom.    Patient Active Problem List    Diagnosis Date Noted   • Diabetes mellitus type II, controlled ([facility]) 07/22/2020   • Long term (current) use of oral hypoglycemic drugs 07/22/2020   • Uncontrolled type 2 diabetes mellitus with hyperglycemia ([facility]) 02/06/2020   • Neuropathy 01/23/2020   • Benign prostatic hyperplasia without lower urinary tract symptoms 01/23/2020   • Obesity (BMI  30-39.9) 01/03/2018   • Pain in both lower extremities 11/06/2017   • Erectile dysfunction 03/08/2017   • Nonspecific abnormal electrocardiogram (ECG) (EKG) - 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 100 Tablet 11   • valsartan-hydrochlorothiazide (DIOVAN-HCT) 320-25 MG per tablet TAKE 1 TABLET EVERY 24 HOURS 90 Tablet 1   • Empagliflozin (JARDIANCE) 25 MG Tab Take 1 Tablet by mouth every day. 90 Tablet 2   • atorvastatin (LIPITOR) 80 MG tablet Take one tablet by mouth every night. 90 Tablet 2   • gabapentin (NEURONTIN) 300 MG Cap TAKE 4 CAPSULES IN THE MORNING AND 4 CAPSULES AT NIGHT, TWICE A  Capsule 14   • metoprolol SR (TOPROL XL) 100 MG TABLET SR 24 HR Take 1 Tablet by mouth every day. 90 Tablet 2   • glipiZIDE (GLUCOTROL) 5 MG Tab Take 1 Tablet by mouth every day. 90 Tablet 2   • amLODIPine (NORVASC) 5 MG Tab Take 1 Tablet by mouth every day. 90 Tablet 2   • Omeprazole 20 MG Tablet Delayed Release Dispersible Take one tab po q24h 90 Tablet 2   • Semaglutide,0.25 or 0.5MG/DOS, (OZEMPIC, 0.25 OR 0.5 MG/DOSE,) 2 MG/1.5ML Solution Pen-injector Inject 0.5mg SQ q14 days 1 mL 5   • metformin (GLUCOPHAGE) 1000 MG tablet TAKE 1 TABLET TWICE A DAY WITH MEALS 180 tablet 3   • vitamin D (CHOLECALCIFEROL) 1000 Unit Tab Take 2 Tabs by mouth every day. 90 Tab 2     No current facility-administered medications for this visit.     No Known Allergies    Review of Systems   Constitutional: Negative for fever, chills and malaise/fatigue.   HENT: Negative for congestion.    Eyes: Negative for pain.   Respiratory: Negative for cough and shortness of breath.   "  Cardiovascular: Negative for chest pain and leg swelling.   Gastrointestinal: Negative for nausea, vomiting, abdominal pain and diarrhea.   Genitourinary: Negative for dysuria and hematuria.   Skin: Negative for rash.   Neurological: Negative for dizziness, focal weakness and headaches.   Endo/Heme/Allergies: Does not bruise/bleed easily.   Psychiatric/Behavioral: Negative for depression.  The patient is not nervous/anxious.      Objective:   /80 (BP Location: Right arm, Patient Position: Sitting, BP Cuff Size: Large adult)   Pulse 87   Temp 36.7 °C (98 °F) (Temporal)   Resp 16   Ht 1.854 m (6' 1\")   Wt 121 kg (266 lb)   SpO2 99%   BMI 35.09 kg/m²      Wt Readings from Last 4 Encounters:   06/01/22 121 kg (266 lb)   01/28/21 116 kg (256 lb)   08/20/20 117 kg (258 lb)   07/22/20 116 kg (256 lb 4.8 oz)           Physical Exam:  Constitutional: Alert, no distress, well-groomed.  Skin: No rashes in visible areas.  Eye: Round. Conjunctiva clear, lids normal. No icterus.   ENMT: Lips pink without lesions, good dentition, moist mucous membranes. Phonation normal.  Neck: No masses, no thyromegaly. Moves freely without pain.  Respiratory: Unlabored respiratory effort, no cough or audible wheeze  Psych: Alert and oriented x3, normal affect and mood.    Latest Reference Range & Units 06/01/22 10:08   Glycohemoglobin 0.0 - 5.6 % 8.0 !   !: Data is abnormal    Assessment and Plan:     1. Annual physical exam    2. Type 2 diabetes mellitus without complication, without long-term current use of insulin (HCC)    Not at goal  Change Trulicity to Ozempic 0.5 mg q. weekly  This will help with weight loss in addition to A1c control  Return to clinic in 3 months  Continue all other diabetic meds    - Empagliflozin (JARDIANCE) 25 MG Tab; Take 1 Tablet by mouth every day.  Dispense: 90 Tablet; Refill: 2  - Semaglutide,0.25 or 0.5MG/DOS, (OZEMPIC, 0.25 OR 0.5 MG/DOSE,) 2 MG/1.5ML Solution Pen-injector; Inject 0.5mg SQ q14 " days  Dispense: 1 mL; Refill: 5    3. Diabetes mellitus without complication (HCC)  - aspirin (ASA) 81 MG Chew Tab chewable tablet; Chew 1 Tablet every day.  Dispense: 100 Tablet; Refill: 11  - POCT A1C  - Diabetic Monofilament Lower Extremity Exam  - Comp Metabolic Panel; Future  - Hemoglobin A1c; Future  - Lipid Profile; Future  - Microalbumin Creat Ratio Urine - Lab Collect; Future  - glipiZIDE (GLUCOTROL) 5 MG Tab; Take 1 Tablet by mouth every day.  Dispense: 90 Tablet; Refill: 2    4. Essential hypertension, benign  - metoprolol SR (TOPROL XL) 100 MG TABLET SR 24 HR; Take 1 Tablet by mouth every day.  Dispense: 90 Tablet; Refill: 2    5. Mixed hyperlipidemia  - CBC WITH DIFFERENTIAL; Future  - valsartan-hydrochlorothiazide (DIOVAN-HCT) 320-25 MG per tablet; TAKE 1 TABLET EVERY 24 HOURS  Dispense: 90 Tablet; Refill: 1    6. Pain in both lower extremities  - gabapentin (NEURONTIN) 300 MG Cap; TAKE 4 CAPSULES IN THE MORNING AND 4 CAPSULES AT NIGHT, TWICE A DAY  Dispense: 180 Capsule; Refill: 14    7. Vitamin D deficiency  - VITAMIN D,25 HYDROXY; Future    8. Screening for colorectal cancer  - Referral to GI for Colonoscopy    9. Need for vaccination  - Pneumococcal Conjugate Vaccine 20-Valent (19 yrs+)    Other orders  - atorvastatin (LIPITOR) 80 MG tablet; Take one tablet by mouth every night.  Dispense: 90 Tablet; Refill: 2  - amLODIPine (NORVASC) 5 MG Tab; Take 1 Tablet by mouth every day.  Dispense: 90 Tablet; Refill: 2  - Omeprazole 20 MG Tablet Delayed Release Dispersible; Take one tab po q24h  Dispense: 90 Tablet; Refill: 2      Health maintenance:     Labs per orders  Immunizations per orders  Patient counseled about skin care, diet, supplements, and exercise.  Discussed colorectal cancer screening     Follow-up: Return in about 3 months (around 9/1/2022) for Reevaluation, labs.

## 2022-06-01 NOTE — TELEPHONE ENCOUNTER
VOICEMAIL  1. Caller Name: [facility]                      Call Back Number: [phone]    2. Message: Pharmacy called stating they do not have any omeprazole dissolvable tablets. Recommends capsules. Please advise.    Case No: [id number]    3. Patient approves office to leave a detailed voicemail/MyChart message: yes

## 2022-06-02 RX ORDER — OMEPRAZOLE 20 MG/1
20 CAPSULE, DELAYED RELEASE ORAL DAILY
Qty: 90 CAPSULE | Refills: 2 | Status: SHIPPED | OUTPATIENT
Start: 2022-06-02 | End: 2023-03-01

## 2023-02-07 DIAGNOSIS — E78.2 MIXED HYPERLIPIDEMIA: ICD-10-CM

## 2023-02-14 RX ORDER — VALSARTAN AND HYDROCHLOROTHIAZIDE 320; 25 MG/1; MG/1
TABLET, FILM COATED ORAL
Qty: 90 TABLET | Refills: 3 | Status: ON HOLD | OUTPATIENT
Start: 2023-02-14 | End: 2023-12-14

## 2023-02-26 DIAGNOSIS — I10 ESSENTIAL HYPERTENSION, BENIGN: ICD-10-CM

## 2023-02-26 DIAGNOSIS — E11.9 TYPE 2 DIABETES MELLITUS WITHOUT COMPLICATION, WITHOUT LONG-TERM CURRENT USE OF INSULIN (HCC): ICD-10-CM

## 2023-02-26 DIAGNOSIS — E11.9 DIABETES MELLITUS WITHOUT COMPLICATION (HCC): ICD-10-CM

## 2023-03-01 RX ORDER — GLIPIZIDE 5 MG/1
TABLET ORAL
Qty: 90 TABLET | Refills: 3 | Status: SHIPPED | OUTPATIENT
Start: 2023-03-01 | End: 2023-12-20 | Stop reason: SDUPTHER

## 2023-03-01 RX ORDER — EMPAGLIFLOZIN 25 MG/1
TABLET, FILM COATED ORAL
Qty: 90 TABLET | Refills: 3 | Status: ON HOLD | OUTPATIENT
Start: 2023-03-01 | End: 2023-12-14

## 2023-03-01 RX ORDER — OMEPRAZOLE 20 MG/1
CAPSULE, DELAYED RELEASE ORAL
Qty: 90 CAPSULE | Refills: 3 | Status: SHIPPED | OUTPATIENT
Start: 2023-03-01 | End: 2023-03-02

## 2023-03-01 RX ORDER — AMLODIPINE BESYLATE 5 MG/1
TABLET ORAL
Qty: 90 TABLET | Refills: 3 | Status: SHIPPED | OUTPATIENT
Start: 2023-03-01 | End: 2023-12-13

## 2023-03-01 RX ORDER — METOPROLOL SUCCINATE 100 MG/1
TABLET, EXTENDED RELEASE ORAL
Qty: 90 TABLET | Refills: 3 | Status: ON HOLD | OUTPATIENT
Start: 2023-03-01 | End: 2023-12-14

## 2023-03-02 ENCOUNTER — OFFICE VISIT (OUTPATIENT)
Dept: MEDICAL GROUP | Age: 53
End: 2023-03-02
Payer: COMMERCIAL

## 2023-03-02 VITALS
HEIGHT: 73 IN | HEART RATE: 77 BPM | OXYGEN SATURATION: 96 % | WEIGHT: 267 LBS | DIASTOLIC BLOOD PRESSURE: 84 MMHG | TEMPERATURE: 97.7 F | BODY MASS INDEX: 35.39 KG/M2 | SYSTOLIC BLOOD PRESSURE: 120 MMHG

## 2023-03-02 DIAGNOSIS — Z00.00 ANNUAL PHYSICAL EXAM: ICD-10-CM

## 2023-03-02 DIAGNOSIS — K92.89 GAS BLOAT SYNDROME: ICD-10-CM

## 2023-03-02 DIAGNOSIS — R15.0 INCOMPLETE DEFECATION: ICD-10-CM

## 2023-03-02 DIAGNOSIS — R30.0 DYSURIA: ICD-10-CM

## 2023-03-02 DIAGNOSIS — R20.0 NUMBNESS AND TINGLING IN LEFT ARM: ICD-10-CM

## 2023-03-02 DIAGNOSIS — R20.2 NUMBNESS AND TINGLING IN LEFT ARM: ICD-10-CM

## 2023-03-02 DIAGNOSIS — E11.65 UNCONTROLLED TYPE 2 DIABETES MELLITUS WITH HYPERGLYCEMIA (HCC): ICD-10-CM

## 2023-03-02 DIAGNOSIS — I10 ESSENTIAL HYPERTENSION: ICD-10-CM

## 2023-03-02 DIAGNOSIS — Z12.11 SCREENING FOR COLON CANCER: ICD-10-CM

## 2023-03-02 DIAGNOSIS — Z11.59 NEED FOR HEPATITIS C SCREENING TEST: ICD-10-CM

## 2023-03-02 DIAGNOSIS — L40.9 PSORIASIS: ICD-10-CM

## 2023-03-02 PROBLEM — R15.9 FECAL INCONTINENCE: Status: ACTIVE | Noted: 2023-03-02

## 2023-03-02 LAB
HBA1C MFR BLD: 13.4 % (ref ?–5.8)
POCT INT CON NEG: NEGATIVE
POCT INT CON POS: POSITIVE

## 2023-03-02 PROCEDURE — 83036 HEMOGLOBIN GLYCOSYLATED A1C: CPT | Performed by: FAMILY MEDICINE

## 2023-03-02 PROCEDURE — 99215 OFFICE O/P EST HI 40 MIN: CPT | Performed by: FAMILY MEDICINE

## 2023-03-02 RX ORDER — SEMAGLUTIDE 1.34 MG/ML
INJECTION, SOLUTION SUBCUTANEOUS
Qty: 1 ML | Refills: 5 | Status: SHIPPED | OUTPATIENT
Start: 2023-03-02 | End: 2023-09-02 | Stop reason: SDUPTHER

## 2023-03-02 RX ORDER — SIMETHICONE 80 MG
80 TABLET,CHEWABLE ORAL EVERY 6 HOURS PRN
Qty: 30 TABLET | Refills: 3 | Status: SHIPPED
Start: 2023-03-02 | End: 2023-06-02

## 2023-03-02 RX ORDER — SEMAGLUTIDE 2.68 MG/ML
INJECTION, SOLUTION SUBCUTANEOUS
Qty: 3 ML | Refills: 3 | Status: SHIPPED | OUTPATIENT
Start: 2023-03-02 | End: 2023-09-02 | Stop reason: SDUPTHER

## 2023-03-02 RX ORDER — TAMSULOSIN HYDROCHLORIDE 0.4 MG/1
0.4 CAPSULE ORAL
Qty: 90 CAPSULE | Refills: 0 | Status: SHIPPED | OUTPATIENT
Start: 2023-03-02 | End: 2023-06-02 | Stop reason: SDUPTHER

## 2023-03-02 RX ORDER — CLOBETASOL PROPIONATE 0.5 MG/G
EMULSION TOPICAL
Qty: 45 G | Refills: 1 | Status: SHIPPED | OUTPATIENT
Start: 2023-03-02 | End: 2023-06-02

## 2023-03-02 RX ORDER — DICYCLOMINE HCL 20 MG
20 TABLET ORAL EVERY 6 HOURS
Qty: 120 TABLET | Refills: 0 | Status: SHIPPED
Start: 2023-03-02 | End: 2023-06-02

## 2023-03-02 ASSESSMENT — PATIENT HEALTH QUESTIONNAIRE - PHQ9: CLINICAL INTERPRETATION OF PHQ2 SCORE: 0

## 2023-03-02 NOTE — PROGRESS NOTES
"This medical record contains text that has been entered with the assistance of computer voice recognition and dictation software.  Therefore, it may contain unintended errors in text, spelling, punctuation, or grammar      Chief Complaint   Patient presents with    Numbness     Numbness in two fingers down to the wrist that lasted about 8-10 hours. PT states it \"felt like pins and needles and it happed a couple of times.\" He also states it hasn't happened in about 2-3 weeks.     Follow-Up     Pt was suppose to get a colonoscopy and never did.           Andrez Sanchez is a 52 y.o. male here evaluation and management of: Knee issues      HPI:         1. Uncontrolled type 2 diabetes mellitus with hyperglycemia (HCC)    Metformin 1000 mg p.o. twice daily  Glipizide 5 mg 1 tab p.o. daily  Jardiance 25 mg p.o. daily  Ozempic 0.5 mg q. 7 days    Matthew states that he lost his father about 3 months ago so he has not been very good with his diet or controlling his diabetes.    2. Numbness and tingling in left arm  NEW UNDIAGNOSED PROBLEM    Andrez states that he has episodic episodes of numbness and tingling in the right wrist thumb and index finger 1 time it lasted for 8 hours he is not sure if it is due to the way he was sleeping but it comes and goes.  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    3. Dysuria  NEW UNDIAGNOSED PROBLEM    Randy and his wife states that he has been having difficulty starting a urine stream, also been having splitting of the stream.  He denies any blood in the urine.    4. Psoriasis  NEW UNDIAGNOSED PROBLEM    Randy states that he has been getting these are the red itchy and silver flaky rashes behind both knees.  He also has severely dry bilateral shins.  He denies any scalp or nail involvement.    5. Incomplete defecation  NEW UNDIAGNOSED PROBLEM    States that he has been having a lot of gas lately, and at night he woke up 3 times in the past 6 months " or so when he had defecated on himself a small amount.  He is not sure if he had a cath at night and he tried to push it out.    6. Gas bloat syndrome  NEW UNDIAGNOSED PROBLEM    Andrez states that he has been having excessive gas lately some indigestion.  He is not sure is because of his diet.  He denies any abdominal pain no blood in the stool or dark tarry stool.    7. Screening for colon cancer  Randy states he needs a new referral for gastroenterology because he did not follow-up due to his father's death.      Current medicines (including changes today)  Current Outpatient Medications   Medication Sig Dispense Refill    Semaglutide,0.25 or 0.5MG/DOS, (OZEMPIC, 0.25 OR 0.5 MG/DOSE,) 2 MG/1.5ML Solution Pen-injector Inject 0.5mg SQ q7 days 1 mL 5    CLOBETASOL PROPIONATE E 0.05 % Cream AAA BID FOR UP TO 14 DAYS THEN STOP 45 g 1    simethicone (MYLICON) 80 MG Chew Tab Chew 1 Tablet every 6 hours as needed for Flatulence. 30 Tablet 3    dicyclomine (BENTYL) 20 MG Tab Take 1 Tablet by mouth every 6 hours. 120 Tablet 0    tamsulosin (FLOMAX) 0.4 MG capsule Take 1 Capsule by mouth 1/2 hour after breakfast. 90 Capsule 0    Semaglutide, 2 MG/DOSE, (OZEMPIC, 2 MG/DOSE,) 8 MG/3ML Solution Pen-injector Inject 1 mg, subcutaneously q. 7 days 3 mL 3    amLODIPine (NORVASC) 5 MG Tab TAKE 1 TABLET DAILY 90 Tablet 3    glipiZIDE (GLUCOTROL) 5 MG Tab TAKE 1 TABLET DAILY 90 Tablet 3    metoprolol SR (TOPROL XL) 100 MG TABLET SR 24 HR TAKE 1 TABLET DAILY 90 Tablet 3    JARDIANCE 25 MG Tab TAKE 1 TABLET DAILY 90 Tablet 3    valsartan-hydrochlorothiazide (DIOVAN-HCT) 320-25 MG per tablet TAKE 1 TABLET EVERY 24 HOURS 90 Tablet 3    aspirin (ASA) 81 MG Chew Tab chewable tablet Chew 1 Tablet every day. 100 Tablet 11    atorvastatin (LIPITOR) 80 MG tablet Take one tablet by mouth every night. 90 Tablet 2    gabapentin (NEURONTIN) 300 MG Cap TAKE 4 CAPSULES IN THE MORNING AND 4 CAPSULES AT NIGHT, TWICE A  Capsule 14    Omeprazole 20  "MG Tablet Delayed Release Dispersible Take one tab po q24h 90 Tablet 2    metformin (GLUCOPHAGE) 1000 MG tablet TAKE 1 TABLET TWICE A DAY WITH MEALS 180 tablet 3    vitamin D (CHOLECALCIFEROL) 1000 Unit Tab Take 2 Tabs by mouth every day. 90 Tab 2     No current facility-administered medications for this visit.     He  has a past medical history of Diabetes (HCC), Hyperlipidemia, Hypertension, and Obesity.  He  has a past surgical history that includes av fistula revision () and uvulopharyngopalatoplasty ().  Social History     Tobacco Use    Smoking status: Never    Smokeless tobacco: Never   Vaping Use    Vaping Use: Never used   Substance Use Topics    Alcohol use: Yes     Comment: rare    Drug use: No     Social History     Social History Narrative    Not on file     Family History   Problem Relation Age of Onset    Cancer Mother     Diabetes Paternal Grandmother      Family Status   Relation Name Status    Mo   at age 68        CHF    Fa  Alive    Bro  Alive    Bro  Alive    Son  Alive    Son  Alive    PGMo  (Not Specified)         ROS    The pertinent  ROS findings can be seen in the HPI above.     All other systems reviewed and are negative     Objective:     /84 (BP Location: Left arm, Patient Position: Sitting, BP Cuff Size: Adult)   Pulse 77   Temp 36.5 °C (97.7 °F) (Temporal)   Ht 1.854 m (6' 1\")   Wt 121 kg (267 lb)   SpO2 96%  Body mass index is 35.23 kg/m².      Physical Exam:    Constitutional: Alert, no distress.  Skin: Behind both knees there is erythematous papules surrounded by a silver flakes measuring about 6 cm  Eye: Equal, round and reactive, conjunctiva clear, lids normal.  ENMT: Lips without lesions, good dentition, oropharynx clear.  Neck: Trachea midline, no masses, no thyromegaly. No cervical or supraclavicular lymphadenopathy.  Respiratory: Unlabored respiratory effort, lungs clear to auscultation, no wheezes, no ronchi.  Cardiovascular: Normal S1, S2, no " murmur, no edema  Abdomen: Soft, non-tender, no masses, no hepatosplenomegaly.  Right wrist, negative Tinel, negative Phalen, normal flexion and extension normal hand squeeze      Assessment and Plan:   The following treatment plan was discussed    All recent labs and provider notes reviewed    1. Uncontrolled type 2 diabetes mellitus with hyperglycemia (HCC)    This is likely due to noncompliance that is uncontrolled  We discussed the importance of medication compliance  Increase Ozempic to 1 mg q. 7 days  He states he was only taking the metformin once a day  Repeat the labs in 3 months    - Semaglutide,0.25 or 0.5MG/DOS, (OZEMPIC, 0.25 OR 0.5 MG/DOSE,) 2 MG/1.5ML Solution Pen-injector; Inject 0.5mg SQ q7 days  Dispense: 1 mL; Refill: 5  - POCT  A1C  - VITAMIN D,25 HYDROXY (DEFICIENCY); Future  - HEP C VIRUS ANTIBODY; Future  - PROSTATE SPECIFIC AG DIAGNOSTIC; Future  - T3 FREE; Future  - TSH+FREE T4  - Lipid Profile; Future  - CBC WITH DIFFERENTIAL; Future  - Comp Metabolic Panel; Future  - Semaglutide, 2 MG/DOSE, (OZEMPIC, 2 MG/DOSE,) 8 MG/3ML Solution Pen-injector; Inject 1 mg, subcutaneously q. 7 days  Dispense: 3 mL; Refill: 3    2. Numbness and tingling in left arm    We will obtain EMG studies and hand surgery and appropriate evaluation and treatment.  - Referral to Hand Surgery    3. Dysuria    Begin Flomax  All side effects explained    - tamsulosin (FLOMAX) 0.4 MG capsule; Take 1 Capsule by mouth 1/2 hour after breakfast.  Dispense: 90 Capsule; Refill: 0    4. Psoriasis    Begin clobetasol  Educated on moisture trapping    - CLOBETASOL PROPIONATE E 0.05 % Cream; AAA BID FOR UP TO 14 DAYS THEN STOP  Dispense: 45 g; Refill: 1    5. Incomplete defecation    Refer to colorectal surgeon for appropriate evaluation and treatment    - Referral to Colorectal Surgery    6. Gas bloat syndrome      We will begin simethicone, Bentyl  We will also add bismuth  as it reduces the odor arising from hydrogen sulfide as  "well as other pungent components of flatus      I did recommend a LOW FODMAP diet    FODMAPs: fermentable oligosaccharides, disaccharides, monosaccharides, and polyols.    Characteristics and sources of common FODMAPs      Word that corresponds to letter in acronym Compounds in this category Foods that contain these compounds  F Fermentable  O Oligosaccharides Fructans, galacto-oligosaccharides Wheat, barley, rye, onion, elisa, white part of spring onion, garlic, shallots, artichokes, beetroot, fennel, peas, chicory, pistachio, cashews, legumes, lentils, and chickpeas  D Disaccharides Lactose Milk, custard, ice cream, and yogurt  M Monosaccharides \"Free fructose\" (fructose in excess of glucose) Apples, pears, mangoes, cherries, watermelon, asparagus, sugar snap peas, honey, high-fructose corn syrup  A And  P Polyols Sorbitol, mannitol, maltitol, and xylitol Apples, pears, apricots, cherries, nectarines, peaches, plums, watermelon, mushrooms, cauliflower, artificially sweetened chewing gum and confectionery    - simethicone (MYLICON) 80 MG Chew Tab; Chew 1 Tablet every 6 hours as needed for Flatulence.  Dispense: 30 Tablet; Refill: 3  - dicyclomine (BENTYL) 20 MG Tab; Take 1 Tablet by mouth every 6 hours.  Dispense: 120 Tablet; Refill: 0    7. Screening for colon cancer    - Referral to Gastroenterology    8. Annual labs    - VITAMIN D,25 HYDROXY (DEFICIENCY); Future  - HEP C VIRUS ANTIBODY; Future  - PROSTATE SPECIFIC AG DIAGNOSTIC; Future  - T3 FREE; Future  - TSH+FREE T4  - Lipid Profile; Future  - CBC WITH DIFFERENTIAL; Future  - Comp Metabolic Panel; Future    9. Need for hepatitis C screening test  - HEP C VIRUS ANTIBODY; Future       My total time spent caring for the patient on the day of the encounter was 40 minutes.   This does not include time spent on separately billable procedures/tests.       Instructed to Follow up in clinic or ER for worsening symptoms, difficulty breathing, lack of expected recovery, " or should new symptoms or problems arise.    Followup: Return in about 3 months (around 6/2/2023) for Reevaluation, labs.

## 2023-03-03 NOTE — RESULT ENCOUNTER NOTE
I discussed results and plan with patient, who stated understanding.       Pablo Cordova MD  [employer], [address] [address]

## 2023-03-17 LAB
25(OH)D3+25(OH)D2 SERPL-MCNC: 66.7 NG/ML (ref 30–100)
ALBUMIN SERPL-MCNC: 4.7 G/DL (ref 3.8–4.9)
ALBUMIN/GLOB SERPL: 1.6 {RATIO} (ref 1.2–2.2)
ALP SERPL-CCNC: 97 IU/L (ref 44–121)
ALT SERPL-CCNC: 31 IU/L (ref 0–44)
AST SERPL-CCNC: 17 IU/L (ref 0–40)
BASOPHILS # BLD AUTO: 0.1 X10E3/UL (ref 0–0.2)
BASOPHILS NFR BLD AUTO: 1 %
BILIRUB SERPL-MCNC: 0.8 MG/DL (ref 0–1.2)
BUN SERPL-MCNC: 27 MG/DL (ref 6–24)
BUN/CREAT SERPL: 19 (ref 9–20)
CALCIUM SERPL-MCNC: 10.2 MG/DL (ref 8.7–10.2)
CHLORIDE SERPL-SCNC: 99 MMOL/L (ref 96–106)
CHOLEST SERPL-MCNC: 187 MG/DL (ref 100–199)
CO2 SERPL-SCNC: 23 MMOL/L (ref 20–29)
CREAT SERPL-MCNC: 1.43 MG/DL (ref 0.76–1.27)
EGFRCR SERPLBLD CKD-EPI 2021: 59 ML/MIN/1.73
EOSINOPHIL # BLD AUTO: 0.2 X10E3/UL (ref 0–0.4)
EOSINOPHIL NFR BLD AUTO: 3 %
ERYTHROCYTE [DISTWIDTH] IN BLOOD BY AUTOMATED COUNT: 12.1 % (ref 11.6–15.4)
GLOBULIN SER CALC-MCNC: 2.9 G/DL (ref 1.5–4.5)
GLUCOSE SERPL-MCNC: 381 MG/DL (ref 70–99)
HCT VFR BLD AUTO: 49.3 % (ref 37.5–51)
HCV IGG SERPL QL IA: NON REACTIVE
HDLC SERPL-MCNC: 36 MG/DL
HGB BLD-MCNC: 16.6 G/DL (ref 13–17.7)
IMM GRANULOCYTES # BLD AUTO: 0 X10E3/UL (ref 0–0.1)
IMM GRANULOCYTES NFR BLD AUTO: 0 %
IMMATURE CELLS  115398: NORMAL
LABORATORY COMMENT REPORT: ABNORMAL
LDLC SERPL CALC-MCNC: 79 MG/DL (ref 0–99)
LYMPHOCYTES # BLD AUTO: 1.2 X10E3/UL (ref 0.7–3.1)
LYMPHOCYTES NFR BLD AUTO: 17 %
MCH RBC QN AUTO: 31.3 PG (ref 26.6–33)
MCHC RBC AUTO-ENTMCNC: 33.7 G/DL (ref 31.5–35.7)
MCV RBC AUTO: 93 FL (ref 79–97)
MONOCYTES # BLD AUTO: 0.6 X10E3/UL (ref 0.1–0.9)
MONOCYTES NFR BLD AUTO: 8 %
MORPHOLOGY BLD-IMP: NORMAL
NEUTROPHILS # BLD AUTO: 5.1 X10E3/UL (ref 1.4–7)
NEUTROPHILS NFR BLD AUTO: 71 %
NRBC BLD AUTO-RTO: NORMAL %
PLATELET # BLD AUTO: 272 X10E3/UL (ref 150–450)
POTASSIUM SERPL-SCNC: 4.5 MMOL/L (ref 3.5–5.2)
PROT SERPL-MCNC: 7.6 G/DL (ref 6–8.5)
PSA SERPL-MCNC: 3.3 NG/ML (ref 0–4)
RBC # BLD AUTO: 5.3 X10E6/UL (ref 4.14–5.8)
SODIUM SERPL-SCNC: 138 MMOL/L (ref 134–144)
T3FREE SERPL-MCNC: 3.4 PG/ML (ref 2–4.4)
T4 FREE SERPL-MCNC: 1.32 NG/DL (ref 0.82–1.77)
TRIGL SERPL-MCNC: 449 MG/DL (ref 0–149)
TSH SERPL DL<=0.005 MIU/L-ACNC: 1.93 UIU/ML (ref 0.45–4.5)
VLDLC SERPL CALC-MCNC: 72 MG/DL (ref 5–40)
WBC # BLD AUTO: 7.1 X10E3/UL (ref 3.4–10.8)

## 2023-05-10 RX ORDER — ATORVASTATIN CALCIUM 80 MG/1
TABLET, FILM COATED ORAL
Qty: 90 TABLET | Refills: 3 | Status: SHIPPED | OUTPATIENT
Start: 2023-05-10

## 2023-06-02 ENCOUNTER — OFFICE VISIT (OUTPATIENT)
Dept: MEDICAL GROUP | Age: 53
End: 2023-06-02
Payer: COMMERCIAL

## 2023-06-02 ENCOUNTER — HOSPITAL ENCOUNTER (OUTPATIENT)
Facility: MEDICAL CENTER | Age: 53
End: 2023-06-02
Attending: FAMILY MEDICINE
Payer: COMMERCIAL

## 2023-06-02 VITALS
WEIGHT: 260 LBS | OXYGEN SATURATION: 95 % | BODY MASS INDEX: 37.22 KG/M2 | HEART RATE: 77 BPM | TEMPERATURE: 97 F | SYSTOLIC BLOOD PRESSURE: 120 MMHG | DIASTOLIC BLOOD PRESSURE: 82 MMHG | HEIGHT: 70 IN

## 2023-06-02 DIAGNOSIS — Z12.11 SCREENING FOR COLON CANCER: ICD-10-CM

## 2023-06-02 DIAGNOSIS — R41.3 MEMORY DEFICIT: ICD-10-CM

## 2023-06-02 DIAGNOSIS — E11.65 UNCONTROLLED TYPE 2 DIABETES MELLITUS WITH HYPERGLYCEMIA (HCC): ICD-10-CM

## 2023-06-02 DIAGNOSIS — R30.0 DYSURIA: ICD-10-CM

## 2023-06-02 LAB
AMBIGUOUS DTTM AMBI4: NORMAL
HBA1C MFR BLD: 13 % (ref ?–5.8)
POCT INT CON NEG: NEGATIVE
POCT INT CON POS: POSITIVE

## 2023-06-02 PROCEDURE — 3079F DIAST BP 80-89 MM HG: CPT | Performed by: FAMILY MEDICINE

## 2023-06-02 PROCEDURE — 83036 HEMOGLOBIN GLYCOSYLATED A1C: CPT | Performed by: FAMILY MEDICINE

## 2023-06-02 PROCEDURE — 3074F SYST BP LT 130 MM HG: CPT | Performed by: FAMILY MEDICINE

## 2023-06-02 PROCEDURE — 99215 OFFICE O/P EST HI 40 MIN: CPT | Performed by: FAMILY MEDICINE

## 2023-06-02 PROCEDURE — 82570 ASSAY OF URINE CREATININE: CPT

## 2023-06-02 PROCEDURE — 82043 UR ALBUMIN QUANTITATIVE: CPT

## 2023-06-02 RX ORDER — TAMSULOSIN HYDROCHLORIDE 0.4 MG/1
0.4 CAPSULE ORAL
Qty: 90 CAPSULE | Refills: 0 | Status: SHIPPED | OUTPATIENT
Start: 2023-06-02 | End: 2023-08-15

## 2023-06-02 RX ORDER — CHLORAL HYDRATE 500 MG
1000 CAPSULE ORAL
COMMUNITY
End: 2023-12-13

## 2023-06-02 ASSESSMENT — FIBROSIS 4 INDEX: FIB4 SCORE: 0.57

## 2023-06-02 NOTE — PROGRESS NOTES
This medical record contains text that has been entered with the assistance of computer voice recognition and dictation software.  Therefore, it may contain unintended errors in text, spelling, punctuation, or grammar      Chief Complaint   Patient presents with    Memory Loss    Diabetes Follow-up    Follow-Up     Needs to know how to use the Ozempic pen.          Andrez Sanchez is a 52 y.o. male here evaluation and management of: Diabetes mellitus, memory issues      HPI:           1. Uncontrolled type 2 diabetes mellitus with hyperglycemia (HCC)    Metformin 1000 mg p.o. twice daily  Glipizide 5 mg 1 tab p.o. daily  Jardiance 25 mg      Patient states that he forgot to take his medication and his wife thinks he has memory issues.  He does not smoke marijuana.  He denies any polydipsia, no polyuria no polyphagia no numbness or tingling.      Andrez states that he did not know how to use the Ozempic pen so he has not been taking it.    2. Memory deficit  His wife has been complaining that he has memory is off, he is also been forgetting to take his medications.    3. Dysuria  Flomax he did not start taking that because he did not know why he was prescribed it.  He had been complaining of dribbling difficulty urinating getting up several times at night to urinate.      4. Screening for colon cancer  Overdue for colon cancer screening.    Current medicines (including changes today)  Current Outpatient Medications   Medication Sig Dispense Refill    Omega-3 Fatty Acids (FISH OIL) 1000 MG Cap capsule Take 1,000 mg by mouth 3 times a day with meals.      tamsulosin (FLOMAX) 0.4 MG capsule Take 1 Capsule by mouth 1/2 hour after breakfast. 90 Capsule 0    atorvastatin (LIPITOR) 80 MG tablet TAKE 1 TABLET EVERY NIGHT 90 Tablet 3    amLODIPine (NORVASC) 5 MG Tab TAKE 1 TABLET DAILY 90 Tablet 3    glipiZIDE (GLUCOTROL) 5 MG Tab TAKE 1 TABLET DAILY 90 Tablet 3    metoprolol SR (TOPROL XL) 100 MG TABLET SR 24 HR TAKE 1  TABLET DAILY 90 Tablet 3    JARDIANCE 25 MG Tab TAKE 1 TABLET DAILY 90 Tablet 3    valsartan-hydrochlorothiazide (DIOVAN-HCT) 320-25 MG per tablet TAKE 1 TABLET EVERY 24 HOURS 90 Tablet 3    aspirin (ASA) 81 MG Chew Tab chewable tablet Chew 1 Tablet every day. 100 Tablet 11    gabapentin (NEURONTIN) 300 MG Cap TAKE 4 CAPSULES IN THE MORNING AND 4 CAPSULES AT NIGHT, TWICE A  Capsule 14    Omeprazole 20 MG Tablet Delayed Release Dispersible Take one tab po q24h 90 Tablet 2    metformin (GLUCOPHAGE) 1000 MG tablet TAKE 1 TABLET TWICE A DAY WITH MEALS 180 tablet 3    vitamin D (CHOLECALCIFEROL) 1000 Unit Tab Take 2 Tabs by mouth every day. 90 Tab 2    Semaglutide,0.25 or 0.5MG/DOS, (OZEMPIC, 0.25 OR 0.5 MG/DOSE,) 2 MG/1.5ML Solution Pen-injector Inject 0.5mg SQ q7 days 1 mL 5    Semaglutide, 2 MG/DOSE, (OZEMPIC, 2 MG/DOSE,) 8 MG/3ML Solution Pen-injector Inject 1 mg, subcutaneously q. 7 days (Patient not taking: Reported on 2023) 3 mL 3     No current facility-administered medications for this visit.     He  has a past medical history of Diabetes (HCC), Hyperlipidemia, Hypertension, and Obesity.  He  has a past surgical history that includes av fistula revision () and uvulopharyngopalatoplasty ().  Social History     Tobacco Use    Smoking status: Never    Smokeless tobacco: Never   Vaping Use    Vaping Use: Never used   Substance Use Topics    Alcohol use: Yes     Comment: rare    Drug use: No     Social History     Social History Narrative    Not on file     Family History   Problem Relation Age of Onset    Cancer Mother     Diabetes Paternal Grandmother      Family Status   Relation Name Status    Mo   at age 68        CHF    Fa  Alive    Bro  Alive    Bro  Alive    Son  Alive    Son  Alive    PGMo  (Not Specified)         ROS    The pertinent  ROS findings can be seen in the HPI above.     All other systems reviewed and are negative     Objective:     /82 (BP Location: Right arm,  "Patient Position: Sitting, BP Cuff Size: Adult long)   Pulse 77   Temp 36.1 °C (97 °F) (Temporal)   Ht 1.778 m (5' 10\")   Wt 118 kg (260 lb)   SpO2 95%  Body mass index is 37.31 kg/m².      Physical Exam:    Constitutional: Alert, no distress.  Skin: No suspicious lesions  Eye: Equal, round and reactive, conjunctiva clear, lids normal.  ENMT: Lips without lesions, good dentition, oropharynx clear.  Neck: Trachea midline, no masses, no thyromegaly. No cervical or supraclavicular lymphadenopathy.  Respiratory: Unlabored respiratory effort, lungs clear to auscultation, no wheezes, no ronchi.  Cardiovascular: Normal S1, S2, no murmur, no edema  Abdomen: Soft, non-tender, no masses, no hepatosplenomegaly.  Monofilament testing with a 10 gram force: sensation intact: intact bilaterally  Visual Inspection: Feet without maceration, ulcers, fissures.  Pedal pulses: intact bilaterally        28/30     Latest Reference Range & Units 06/02/23 09:22   Glycohemoglobin 5.8 % 13.0 !   !: Data is abnormal        Assessment and Plan:   The following treatment plan was discussed    All recent labs and provider notes reviewed    1. Uncontrolled type 2 diabetes mellitus with hyperglycemia (HCC)    Hemoglobin A1c is still above 13  Patient refuses insulin  He agrees to starting Ozempic and being more medically compliant, repeat labs in 2 months    - Microalbumin Creat Ratio Urine (Clinic Collect); Future  - POCT  A1C    2. Memory deficit    We discussed that uncontrolled sugars can cause brain fog  He will become more medically compliant  Neuroimaging not clinically indicated as this is not an acute onset of cognitive impairment nor a rapid neurologic deterioration.   Encouraged patient to play brain games  Learn a new language, use  opposite hand,  Also cardivascular risk stratification    3. Dysuria  - tamsulosin (FLOMAX) 0.4 MG capsule; Take 1 Capsule by mouth 1/2 hour after breakfast.  Dispense: 90 Capsule; Refill: 0    4. " Screening for colon cancer  - Referral to Gastroenterology    Other orders  - Omega-3 Fatty Acids (FISH OIL) 1000 MG Cap capsule; Take 1,000 mg by mouth 3 times a day with meals.         My total time spent caring for the patient on the day of the encounter was 40 minutes.   This does not include time spent on separately billable procedures/tests.     Instructed to Follow up in clinic or ER for worsening symptoms, difficulty breathing, lack of expected recovery, or should new symptoms or problems arise.    Followup: Return in about 3 months (around 9/2/2023) for Reevaluation, labs.

## 2023-06-02 NOTE — RESULT ENCOUNTER NOTE
I discussed results and plan with patient, who stated understanding.       Pablo Cordova MD  [employer], [address] [address]

## 2023-06-03 LAB
CREAT UR-MCNC: 47.18 MG/DL
MICROALBUMIN UR-MCNC: 1.3 MG/DL
MICROALBUMIN/CREAT UR: 28 MG/G (ref 0–30)

## 2023-06-04 DIAGNOSIS — M79.604 PAIN IN BOTH LOWER EXTREMITIES: ICD-10-CM

## 2023-06-04 DIAGNOSIS — M79.605 PAIN IN BOTH LOWER EXTREMITIES: ICD-10-CM

## 2023-06-06 RX ORDER — GABAPENTIN 300 MG/1
CAPSULE ORAL
Qty: 180 CAPSULE | Refills: 14 | Status: SHIPPED | OUTPATIENT
Start: 2023-06-06 | End: 2023-10-04 | Stop reason: SDUPTHER

## 2023-08-13 DIAGNOSIS — R30.0 DYSURIA: ICD-10-CM

## 2023-08-15 RX ORDER — TAMSULOSIN HYDROCHLORIDE 0.4 MG/1
0.4 CAPSULE ORAL
Qty: 90 CAPSULE | Refills: 3 | Status: SHIPPED | OUTPATIENT
Start: 2023-08-15 | End: 2023-12-27 | Stop reason: SDUPTHER

## 2023-08-24 DIAGNOSIS — E11.9 DIABETES MELLITUS WITHOUT COMPLICATION (HCC): ICD-10-CM

## 2023-08-25 RX ORDER — ASPIRIN 81 MG
81 TABLET,CHEWABLE ORAL
Qty: 100 TABLET | Refills: 3 | Status: SHIPPED | OUTPATIENT
Start: 2023-08-25 | End: 2023-12-13

## 2023-09-02 DIAGNOSIS — E11.65 UNCONTROLLED TYPE 2 DIABETES MELLITUS WITH HYPERGLYCEMIA (HCC): ICD-10-CM

## 2023-09-05 RX ORDER — SEMAGLUTIDE 1.34 MG/ML
INJECTION, SOLUTION SUBCUTANEOUS
Qty: 1 ML | Refills: 5 | Status: SHIPPED | OUTPATIENT
Start: 2023-09-05 | End: 2023-09-13

## 2023-09-05 RX ORDER — SEMAGLUTIDE 2.68 MG/ML
INJECTION, SOLUTION SUBCUTANEOUS
Qty: 3 ML | Refills: 3 | Status: SHIPPED | OUTPATIENT
Start: 2023-09-05 | End: 2023-09-13 | Stop reason: SDUPTHER

## 2023-09-13 DIAGNOSIS — E11.65 UNCONTROLLED TYPE 2 DIABETES MELLITUS WITH HYPERGLYCEMIA (HCC): ICD-10-CM

## 2023-09-13 RX ORDER — SEMAGLUTIDE 2.68 MG/ML
INJECTION, SOLUTION SUBCUTANEOUS
Qty: 3 ML | Refills: 3 | Status: SHIPPED | OUTPATIENT
Start: 2023-09-13 | End: 2023-09-14

## 2023-09-14 RX ORDER — OMEPRAZOLE 20 MG/1
20 CAPSULE, DELAYED RELEASE ORAL DAILY
Qty: 90 CAPSULE | Refills: 2 | Status: SHIPPED | OUTPATIENT
Start: 2023-09-14 | End: 2023-12-13

## 2023-09-14 RX ORDER — SEMAGLUTIDE 1.34 MG/ML
INJECTION, SOLUTION SUBCUTANEOUS
Qty: 3 ML | Refills: 6 | Status: SHIPPED | OUTPATIENT
Start: 2023-09-14 | End: 2023-12-18 | Stop reason: SDUPTHER

## 2023-10-04 DIAGNOSIS — E11.9 TYPE 2 DIABETES MELLITUS WITHOUT COMPLICATION, WITHOUT LONG-TERM CURRENT USE OF INSULIN (HCC): ICD-10-CM

## 2023-10-04 DIAGNOSIS — M79.605 PAIN IN BOTH LOWER EXTREMITIES: ICD-10-CM

## 2023-10-04 DIAGNOSIS — M79.604 PAIN IN BOTH LOWER EXTREMITIES: ICD-10-CM

## 2023-10-05 RX ORDER — GABAPENTIN 300 MG/1
CAPSULE ORAL
Qty: 180 CAPSULE | Refills: 14 | Status: SHIPPED | OUTPATIENT
Start: 2023-10-05 | End: 2023-12-13

## 2023-10-17 ENCOUNTER — OFFICE VISIT (OUTPATIENT)
Dept: MEDICAL GROUP | Age: 53
End: 2023-10-17
Payer: COMMERCIAL

## 2023-10-17 VITALS
OXYGEN SATURATION: 94 % | SYSTOLIC BLOOD PRESSURE: 80 MMHG | DIASTOLIC BLOOD PRESSURE: 58 MMHG | TEMPERATURE: 98.1 F | HEART RATE: 82 BPM | BODY MASS INDEX: 37.65 KG/M2 | WEIGHT: 263 LBS | HEIGHT: 70 IN

## 2023-10-17 DIAGNOSIS — Z12.11 SCREENING FOR COLON CANCER: ICD-10-CM

## 2023-10-17 DIAGNOSIS — E11.9 TYPE 2 DIABETES MELLITUS WITHOUT COMPLICATION, WITHOUT LONG-TERM CURRENT USE OF INSULIN (HCC): ICD-10-CM

## 2023-10-17 DIAGNOSIS — I95.2 HYPOTENSION DUE TO DRUGS: ICD-10-CM

## 2023-10-17 LAB — RETINAL SCREEN: NORMAL

## 2023-10-17 PROCEDURE — 99214 OFFICE O/P EST MOD 30 MIN: CPT | Performed by: FAMILY MEDICINE

## 2023-10-17 PROCEDURE — 3074F SYST BP LT 130 MM HG: CPT | Performed by: FAMILY MEDICINE

## 2023-10-17 PROCEDURE — 3078F DIAST BP <80 MM HG: CPT | Performed by: FAMILY MEDICINE

## 2023-10-17 ASSESSMENT — FIBROSIS 4 INDEX: FIB4 SCORE: 0.58

## 2023-10-17 NOTE — PROGRESS NOTES
This medical record contains text that has been entered with the assistance of computer voice recognition and dictation software.  Therefore, it may contain unintended errors in text, spelling, punctuation, or grammar      Chief Complaint   Patient presents with    Diabetes Follow-up         Andrez Sanchez is a 53 y.o. male here evaluation and management of: Routine follow-up      HPI:           1. Hypotension due to drugs  NEW UNDIAGNOSED PROBLEM    Valsartan/hydrochlorothiazide 320/25 mg p.o. daily  Metoprolol sustained-release 100 mg p.o. daily  Amlodipine 5 mg p.o. daily    The patient states that he thought that his blood pressure would be elevated today because he and his wife find out that his wife has colon cancer.  She just survived breast cancer and this new diagnosis is making him very anxious.  He denies any headaches no chest pain no back pain no weakness no numbness or tingling.    2. Screening for colon cancer  Andrez states that he did not get a call from the referral service to set up the colonoscopy.    Current medicines (including changes today)  Current Outpatient Medications   Medication Sig Dispense Refill    metformin (GLUCOPHAGE) 1000 MG tablet Take 1 Tablet by mouth 2 times a day with meals. 180 Tablet 3    gabapentin (NEURONTIN) 300 MG Cap TAKE 4 CAPSULES IN THE MORNING AND 4 CAPSULES AT NIGHT, TWICE A  Capsule 14    omeprazole (PRILOSEC) 20 MG delayed-release capsule Take 1 Capsule by mouth every day. 90 Capsule 2    ASPIRIN LOW DOSE 81 MG Chew Tab chewable tablet CHEW 1 TABLET DAILY 100 Tablet 3    tamsulosin (FLOMAX) 0.4 MG capsule TAKE 1 CAPSULE 1/2 HOUR AFTER BREAKFAST 90 Capsule 3    Semaglutide, 1 MG/DOSE, (OZEMPIC, 1 MG/DOSE,) 4 MG/3ML Solution Pen-injector Inject 1 mg subcutaneously q. 7 days 3 mL 6    Omega-3 Fatty Acids (FISH OIL) 1000 MG Cap capsule Take 1,000 mg by mouth 3 times a day with meals.      atorvastatin (LIPITOR) 80 MG tablet TAKE 1 TABLET EVERY NIGHT 90  "Tablet 3    amLODIPine (NORVASC) 5 MG Tab TAKE 1 TABLET DAILY 90 Tablet 3    glipiZIDE (GLUCOTROL) 5 MG Tab TAKE 1 TABLET DAILY 90 Tablet 3    metoprolol SR (TOPROL XL) 100 MG TABLET SR 24 HR TAKE 1 TABLET DAILY 90 Tablet 3    JARDIANCE 25 MG Tab TAKE 1 TABLET DAILY 90 Tablet 3    valsartan-hydrochlorothiazide (DIOVAN-HCT) 320-25 MG per tablet TAKE 1 TABLET EVERY 24 HOURS 90 Tablet 3    vitamin D (CHOLECALCIFEROL) 1000 Unit Tab Take 2 Tabs by mouth every day. 90 Tab 2     No current facility-administered medications for this visit.     He  has a past medical history of Diabetes (HCC), Hyperlipidemia, Hypertension, and Obesity.  He  has a past surgical history that includes av fistula revision () and uvulopharyngopalatoplasty ().  Social History     Tobacco Use    Smoking status: Never    Smokeless tobacco: Never   Vaping Use    Vaping Use: Never used   Substance Use Topics    Alcohol use: Yes     Comment: rare    Drug use: No     Social History     Social History Narrative    Not on file     Family History   Problem Relation Age of Onset    Cancer Mother     Diabetes Paternal Grandmother      Family Status   Relation Name Status    Mo   at age 68        CHF    Fa  Alive    Bro  Alive    Bro  Alive    Son  Alive    Son  Alive    PGMo  (Not Specified)         ROS    The pertinent  ROS findings can be seen in the HPI above.     All other systems reviewed and are negative     Objective:     BP (!) 80/58 (BP Location: Left arm, Patient Position: Sitting, BP Cuff Size: Adult long)   Pulse 82   Temp 36.7 °C (98.1 °F) (Temporal)   Ht 1.778 m (5' 10\")   Wt 119 kg (263 lb)   SpO2 94%  Body mass index is 37.74 kg/m².      Physical Exam:    Constitutional: Alert, no distress.  Skin: No suspicious lesions  Eye: Equal, round and reactive, conjunctiva clear, lids normal.  ENMT: Lips without lesions, good dentition, oropharynx clear.  Neck: Trachea midline, no masses, no thyromegaly. No cervical or " supraclavicular lymphadenopathy.  Respiratory: Unlabored respiratory effort, lungs clear to auscultation, no wheezes, no ronchi.  Cardiovascular: Normal S1, S2, no murmur, no edema  Abdomen: Soft, non-tender, no masses, no hepatosplenomegaly.        Assessment and Plan:   The following treatment plan was discussed    All recent labs and provider notes reviewed    1. Hypotension due to drugs  Stop amlodipine    Continue metoprolol 100 mg.  Continue Diovan//25 mg.  Return to clinic with a 3 to 4-week BP log    2. Screening for colon cancer    Due for colon cancer screening  No personal or familial history of colon cancer.   No acute gastrointestinal complaints including abdominal pain, bowel changes, hematochezia or melena.     - COLOGUARD COLON CANCER SCREENING             Instructed to Follow up in clinic or ER for worsening symptoms, difficulty breathing, lack of expected recovery, or should new symptoms or problems arise.    Followup: Return in about 4 weeks (around 11/14/2023) for Reevaluation, labs, 3-4 week BP log.

## 2023-11-16 DIAGNOSIS — E11.65 UNCONTROLLED TYPE 2 DIABETES MELLITUS WITH HYPERGLYCEMIA (HCC): ICD-10-CM

## 2023-12-13 ENCOUNTER — HOSPITAL ENCOUNTER (OUTPATIENT)
Facility: MEDICAL CENTER | Age: 53
End: 2023-12-15
Attending: STUDENT IN AN ORGANIZED HEALTH CARE EDUCATION/TRAINING PROGRAM | Admitting: INTERNAL MEDICINE
Payer: COMMERCIAL

## 2023-12-13 ENCOUNTER — TELEPHONE (OUTPATIENT)
Dept: MEDICAL GROUP | Age: 53
End: 2023-12-13
Payer: COMMERCIAL

## 2023-12-13 ENCOUNTER — APPOINTMENT (OUTPATIENT)
Dept: RADIOLOGY | Facility: MEDICAL CENTER | Age: 53
End: 2023-12-13
Attending: STUDENT IN AN ORGANIZED HEALTH CARE EDUCATION/TRAINING PROGRAM
Payer: COMMERCIAL

## 2023-12-13 DIAGNOSIS — R79.89 ELEVATED TROPONIN: ICD-10-CM

## 2023-12-13 DIAGNOSIS — I95.2 HYPOTENSION DUE TO DRUGS: ICD-10-CM

## 2023-12-13 DIAGNOSIS — R55 NEAR SYNCOPE: ICD-10-CM

## 2023-12-13 DIAGNOSIS — K21.9 GASTROESOPHAGEAL REFLUX DISEASE WITHOUT ESOPHAGITIS: ICD-10-CM

## 2023-12-13 DIAGNOSIS — N17.9 AKI (ACUTE KIDNEY INJURY) (HCC): ICD-10-CM

## 2023-12-13 DIAGNOSIS — E85.4 CARDIAC AMYLOIDOSIS (HCC): ICD-10-CM

## 2023-12-13 DIAGNOSIS — K92.89 GAS BLOAT SYNDROME: ICD-10-CM

## 2023-12-13 DIAGNOSIS — I43 CARDIAC AMYLOIDOSIS (HCC): ICD-10-CM

## 2023-12-13 DIAGNOSIS — I95.9 HYPOTENSION, UNSPECIFIED HYPOTENSION TYPE: ICD-10-CM

## 2023-12-13 DIAGNOSIS — E11.65 UNCONTROLLED TYPE 2 DIABETES MELLITUS WITH HYPERGLYCEMIA (HCC): ICD-10-CM

## 2023-12-13 LAB
ALBUMIN SERPL BCP-MCNC: 4.3 G/DL (ref 3.2–4.9)
ALBUMIN/GLOB SERPL: 1.6 G/DL
ALP SERPL-CCNC: 62 U/L (ref 30–99)
ALT SERPL-CCNC: 22 U/L (ref 2–50)
ANION GAP SERPL CALC-SCNC: 15 MMOL/L (ref 7–16)
APPEARANCE UR: CLEAR
AST SERPL-CCNC: 12 U/L (ref 12–45)
BASOPHILS # BLD AUTO: 0.7 % (ref 0–1.8)
BASOPHILS # BLD: 0.04 K/UL (ref 0–0.12)
BILIRUB SERPL-MCNC: 0.6 MG/DL (ref 0.1–1.5)
BILIRUB UR QL STRIP.AUTO: NEGATIVE
BUN SERPL-MCNC: 37 MG/DL (ref 8–22)
CALCIUM ALBUM COR SERPL-MCNC: 9.5 MG/DL (ref 8.5–10.5)
CALCIUM SERPL-MCNC: 9.7 MG/DL (ref 8.5–10.5)
CHLORIDE SERPL-SCNC: 104 MMOL/L (ref 96–112)
CK SERPL-CCNC: 72 U/L (ref 0–154)
CO2 SERPL-SCNC: 22 MMOL/L (ref 20–33)
COLOR UR: YELLOW
CREAT SERPL-MCNC: 2.38 MG/DL (ref 0.5–1.4)
EKG IMPRESSION: NORMAL
EOSINOPHIL # BLD AUTO: 0.31 K/UL (ref 0–0.51)
EOSINOPHIL NFR BLD: 5.2 % (ref 0–6.9)
ERYTHROCYTE [DISTWIDTH] IN BLOOD BY AUTOMATED COUNT: 40.1 FL (ref 35.9–50)
GFR SERPLBLD CREATININE-BSD FMLA CKD-EPI: 32 ML/MIN/1.73 M 2
GLOBULIN SER CALC-MCNC: 2.7 G/DL (ref 1.9–3.5)
GLUCOSE BLD STRIP.AUTO-MCNC: 106 MG/DL (ref 65–99)
GLUCOSE BLD STRIP.AUTO-MCNC: 132 MG/DL (ref 65–99)
GLUCOSE SERPL-MCNC: 139 MG/DL (ref 65–99)
GLUCOSE UR STRIP.AUTO-MCNC: 500 MG/DL
HCT VFR BLD AUTO: 41 % (ref 42–52)
HGB BLD-MCNC: 14.2 G/DL (ref 14–18)
IMM GRANULOCYTES # BLD AUTO: 0.01 K/UL (ref 0–0.11)
IMM GRANULOCYTES NFR BLD AUTO: 0.2 % (ref 0–0.9)
KETONES UR STRIP.AUTO-MCNC: ABNORMAL MG/DL
LACTATE SERPL-SCNC: 2.7 MMOL/L (ref 0.5–2)
LEUKOCYTE ESTERASE UR QL STRIP.AUTO: NEGATIVE
LYMPHOCYTES # BLD AUTO: 1.79 K/UL (ref 1–4.8)
LYMPHOCYTES NFR BLD: 29.9 % (ref 22–41)
MCH RBC QN AUTO: 31.4 PG (ref 27–33)
MCHC RBC AUTO-ENTMCNC: 34.6 G/DL (ref 32.3–36.5)
MCV RBC AUTO: 90.7 FL (ref 81.4–97.8)
MICRO URNS: ABNORMAL
MONOCYTES # BLD AUTO: 0.58 K/UL (ref 0–0.85)
MONOCYTES NFR BLD AUTO: 9.7 % (ref 0–13.4)
NEUTROPHILS # BLD AUTO: 3.25 K/UL (ref 1.82–7.42)
NEUTROPHILS NFR BLD: 54.3 % (ref 44–72)
NITRITE UR QL STRIP.AUTO: NEGATIVE
NRBC # BLD AUTO: 0 K/UL
NRBC BLD-RTO: 0 /100 WBC (ref 0–0.2)
PH UR STRIP.AUTO: 5 [PH] (ref 5–8)
PHOSPHATE SERPL-MCNC: 4.2 MG/DL (ref 2.5–4.5)
PLATELET # BLD AUTO: 259 K/UL (ref 164–446)
PMV BLD AUTO: 10.1 FL (ref 9–12.9)
POTASSIUM SERPL-SCNC: 4.1 MMOL/L (ref 3.6–5.5)
PROT SERPL-MCNC: 7 G/DL (ref 6–8.2)
PROT UR QL STRIP: NEGATIVE MG/DL
RBC # BLD AUTO: 4.52 M/UL (ref 4.7–6.1)
RBC UR QL AUTO: NEGATIVE
SODIUM SERPL-SCNC: 141 MMOL/L (ref 135–145)
SP GR UR STRIP.AUTO: 1.03
TROPONIN T SERPL-MCNC: 24 NG/L (ref 6–19)
TROPONIN T SERPL-MCNC: 25 NG/L (ref 6–19)
TROPONIN T SERPL-MCNC: 27 NG/L (ref 6–19)
URATE SERPL-MCNC: 9.9 MG/DL (ref 2.5–8.3)
UROBILINOGEN UR STRIP.AUTO-MCNC: 0.2 MG/DL
WBC # BLD AUTO: 6 K/UL (ref 4.8–10.8)

## 2023-12-13 PROCEDURE — 82550 ASSAY OF CK (CPK): CPT

## 2023-12-13 PROCEDURE — 85025 COMPLETE CBC W/AUTO DIFF WBC: CPT

## 2023-12-13 PROCEDURE — 84100 ASSAY OF PHOSPHORUS: CPT

## 2023-12-13 PROCEDURE — 84484 ASSAY OF TROPONIN QUANT: CPT

## 2023-12-13 PROCEDURE — G0378 HOSPITAL OBSERVATION PER HR: HCPCS

## 2023-12-13 PROCEDURE — 82962 GLUCOSE BLOOD TEST: CPT

## 2023-12-13 PROCEDURE — 71045 X-RAY EXAM CHEST 1 VIEW: CPT

## 2023-12-13 PROCEDURE — 80053 COMPREHEN METABOLIC PANEL: CPT

## 2023-12-13 PROCEDURE — 36415 COLL VENOUS BLD VENIPUNCTURE: CPT

## 2023-12-13 PROCEDURE — 84550 ASSAY OF BLOOD/URIC ACID: CPT

## 2023-12-13 PROCEDURE — A9270 NON-COVERED ITEM OR SERVICE: HCPCS | Performed by: INTERNAL MEDICINE

## 2023-12-13 PROCEDURE — 93005 ELECTROCARDIOGRAM TRACING: CPT

## 2023-12-13 PROCEDURE — 83605 ASSAY OF LACTIC ACID: CPT

## 2023-12-13 PROCEDURE — 99285 EMERGENCY DEPT VISIT HI MDM: CPT

## 2023-12-13 PROCEDURE — 700102 HCHG RX REV CODE 250 W/ 637 OVERRIDE(OP): Performed by: INTERNAL MEDICINE

## 2023-12-13 PROCEDURE — 99223 1ST HOSP IP/OBS HIGH 75: CPT | Performed by: INTERNAL MEDICINE

## 2023-12-13 PROCEDURE — 81003 URINALYSIS AUTO W/O SCOPE: CPT

## 2023-12-13 PROCEDURE — 700105 HCHG RX REV CODE 258: Performed by: STUDENT IN AN ORGANIZED HEALTH CARE EDUCATION/TRAINING PROGRAM

## 2023-12-13 PROCEDURE — 93005 ELECTROCARDIOGRAM TRACING: CPT | Performed by: STUDENT IN AN ORGANIZED HEALTH CARE EDUCATION/TRAINING PROGRAM

## 2023-12-13 RX ORDER — OXYCODONE HYDROCHLORIDE 5 MG/1
2.5 TABLET ORAL
Status: DISCONTINUED | OUTPATIENT
Start: 2023-12-13 | End: 2023-12-14

## 2023-12-13 RX ORDER — AMINOPHYLLINE 25 MG/ML
100 INJECTION, SOLUTION INTRAVENOUS
Status: DISCONTINUED | OUTPATIENT
Start: 2023-12-13 | End: 2023-12-15 | Stop reason: HOSPADM

## 2023-12-13 RX ORDER — GABAPENTIN 300 MG/1
1200 CAPSULE ORAL EVERY MORNING
COMMUNITY
End: 2023-12-27 | Stop reason: SDUPTHER

## 2023-12-13 RX ORDER — AMOXICILLIN 250 MG
2 CAPSULE ORAL 2 TIMES DAILY
Status: DISCONTINUED | OUTPATIENT
Start: 2023-12-13 | End: 2023-12-15 | Stop reason: HOSPADM

## 2023-12-13 RX ORDER — GABAPENTIN 100 MG/1
200 CAPSULE ORAL 2 TIMES DAILY
Status: DISCONTINUED | OUTPATIENT
Start: 2023-12-13 | End: 2023-12-15 | Stop reason: HOSPADM

## 2023-12-13 RX ORDER — PROCHLORPERAZINE EDISYLATE 5 MG/ML
5-10 INJECTION INTRAMUSCULAR; INTRAVENOUS EVERY 4 HOURS PRN
Status: DISCONTINUED | OUTPATIENT
Start: 2023-12-13 | End: 2023-12-15 | Stop reason: HOSPADM

## 2023-12-13 RX ORDER — OMEPRAZOLE 20 MG/1
20 CAPSULE, DELAYED RELEASE ORAL DAILY
Status: DISCONTINUED | OUTPATIENT
Start: 2023-12-14 | End: 2023-12-13

## 2023-12-13 RX ORDER — ONDANSETRON 2 MG/ML
4 INJECTION INTRAMUSCULAR; INTRAVENOUS EVERY 4 HOURS PRN
Status: DISCONTINUED | OUTPATIENT
Start: 2023-12-13 | End: 2023-12-15 | Stop reason: HOSPADM

## 2023-12-13 RX ORDER — PROMETHAZINE HYDROCHLORIDE 25 MG/1
12.5-25 TABLET ORAL EVERY 4 HOURS PRN
Status: DISCONTINUED | OUTPATIENT
Start: 2023-12-13 | End: 2023-12-15 | Stop reason: HOSPADM

## 2023-12-13 RX ORDER — ASPIRIN 325 MG
325 TABLET ORAL DAILY
Status: DISCONTINUED | OUTPATIENT
Start: 2023-12-14 | End: 2023-12-13

## 2023-12-13 RX ORDER — PROMETHAZINE HYDROCHLORIDE 25 MG/1
12.5-25 SUPPOSITORY RECTAL EVERY 4 HOURS PRN
Status: DISCONTINUED | OUTPATIENT
Start: 2023-12-13 | End: 2023-12-15 | Stop reason: HOSPADM

## 2023-12-13 RX ORDER — CHOLECALCIFEROL (VITAMIN D3) 125 MCG
1 CAPSULE ORAL
COMMUNITY

## 2023-12-13 RX ORDER — ATORVASTATIN CALCIUM 80 MG/1
80 TABLET, FILM COATED ORAL DAILY
Status: DISCONTINUED | OUTPATIENT
Start: 2023-12-14 | End: 2023-12-15 | Stop reason: HOSPADM

## 2023-12-13 RX ORDER — ASPIRIN 81 MG/1
81 TABLET ORAL DAILY
COMMUNITY

## 2023-12-13 RX ORDER — BISACODYL 10 MG
10 SUPPOSITORY, RECTAL RECTAL
Status: DISCONTINUED | OUTPATIENT
Start: 2023-12-13 | End: 2023-12-15 | Stop reason: HOSPADM

## 2023-12-13 RX ORDER — OXYCODONE HYDROCHLORIDE 5 MG/1
5 TABLET ORAL
Status: DISCONTINUED | OUTPATIENT
Start: 2023-12-13 | End: 2023-12-14

## 2023-12-13 RX ORDER — ONDANSETRON 4 MG/1
4 TABLET, ORALLY DISINTEGRATING ORAL EVERY 4 HOURS PRN
Status: DISCONTINUED | OUTPATIENT
Start: 2023-12-13 | End: 2023-12-15 | Stop reason: HOSPADM

## 2023-12-13 RX ORDER — POLYETHYLENE GLYCOL 3350 17 G/17G
1 POWDER, FOR SOLUTION ORAL
Status: DISCONTINUED | OUTPATIENT
Start: 2023-12-13 | End: 2023-12-15 | Stop reason: HOSPADM

## 2023-12-13 RX ORDER — ASPIRIN 300 MG/1
300 SUPPOSITORY RECTAL DAILY
Status: DISCONTINUED | OUTPATIENT
Start: 2023-12-14 | End: 2023-12-13

## 2023-12-13 RX ORDER — ACETAMINOPHEN 325 MG/1
650 TABLET ORAL EVERY 6 HOURS PRN
Status: DISCONTINUED | OUTPATIENT
Start: 2023-12-13 | End: 2023-12-15 | Stop reason: HOSPADM

## 2023-12-13 RX ORDER — ASPIRIN 81 MG/1
81 TABLET, CHEWABLE ORAL
Status: DISCONTINUED | OUTPATIENT
Start: 2023-12-13 | End: 2023-12-15 | Stop reason: HOSPADM

## 2023-12-13 RX ORDER — HYDROMORPHONE HYDROCHLORIDE 1 MG/ML
0.25 INJECTION, SOLUTION INTRAMUSCULAR; INTRAVENOUS; SUBCUTANEOUS
Status: DISCONTINUED | OUTPATIENT
Start: 2023-12-13 | End: 2023-12-14

## 2023-12-13 RX ORDER — ASPIRIN 81 MG/1
324 TABLET, CHEWABLE ORAL DAILY
Status: DISCONTINUED | OUTPATIENT
Start: 2023-12-14 | End: 2023-12-13

## 2023-12-13 RX ORDER — REGADENOSON 0.08 MG/ML
0.4 INJECTION, SOLUTION INTRAVENOUS
Status: COMPLETED | OUTPATIENT
Start: 2023-12-13 | End: 2023-12-14

## 2023-12-13 RX ORDER — SODIUM CHLORIDE 9 MG/ML
1000 INJECTION, SOLUTION INTRAVENOUS ONCE
Status: COMPLETED | OUTPATIENT
Start: 2023-12-13 | End: 2023-12-13

## 2023-12-13 RX ORDER — ENOXAPARIN SODIUM 100 MG/ML
40 INJECTION SUBCUTANEOUS DAILY
Status: DISCONTINUED | OUTPATIENT
Start: 2023-12-14 | End: 2023-12-15 | Stop reason: HOSPADM

## 2023-12-13 RX ADMIN — SODIUM CHLORIDE 1000 ML: 9 INJECTION, SOLUTION INTRAVENOUS at 18:36

## 2023-12-13 RX ADMIN — GABAPENTIN 200 MG: 100 CAPSULE ORAL at 21:30

## 2023-12-13 RX ADMIN — ASPIRIN 81 MG: 81 TABLET, CHEWABLE ORAL at 21:30

## 2023-12-13 ASSESSMENT — PATIENT HEALTH QUESTIONNAIRE - PHQ9
1. LITTLE INTEREST OR PLEASURE IN DOING THINGS: NOT AT ALL
SUM OF ALL RESPONSES TO PHQ9 QUESTIONS 1 AND 2: 0
2. FEELING DOWN, DEPRESSED, IRRITABLE, OR HOPELESS: NOT AT ALL
SUM OF ALL RESPONSES TO PHQ9 QUESTIONS 1 AND 2: 0
2. FEELING DOWN, DEPRESSED, IRRITABLE, OR HOPELESS: NOT AT ALL
1. LITTLE INTEREST OR PLEASURE IN DOING THINGS: NOT AT ALL

## 2023-12-13 ASSESSMENT — COGNITIVE AND FUNCTIONAL STATUS - GENERAL
MOBILITY SCORE: 24
SUGGESTED CMS G CODE MODIFIER DAILY ACTIVITY: CH
SUGGESTED CMS G CODE MODIFIER MOBILITY: CH
DAILY ACTIVITIY SCORE: 24

## 2023-12-13 ASSESSMENT — LIFESTYLE VARIABLES
TOTAL SCORE: 0
EVER FELT BAD OR GUILTY ABOUT YOUR DRINKING: NO
DOES PATIENT WANT TO STOP DRINKING: CANNOT ASSESS
EVER HAD A DRINK FIRST THING IN THE MORNING TO STEADY YOUR NERVES TO GET RID OF A HANGOVER: NO
AVERAGE NUMBER OF DAYS PER WEEK YOU HAVE A DRINK CONTAINING ALCOHOL: 0
HOW MANY TIMES IN THE PAST YEAR HAVE YOU HAD 5 OR MORE DRINKS IN A DAY: 0
HAVE PEOPLE ANNOYED YOU BY CRITICIZING YOUR DRINKING: NO
TOTAL SCORE: 0
ON A TYPICAL DAY WHEN YOU DRINK ALCOHOL HOW MANY DRINKS DO YOU HAVE: 0
TOTAL SCORE: 0
CONSUMPTION TOTAL: NEGATIVE
ALCOHOL_USE: NO
HAVE YOU EVER FELT YOU SHOULD CUT DOWN ON YOUR DRINKING: NO

## 2023-12-13 ASSESSMENT — FIBROSIS 4 INDEX
FIB4 SCORE: 0.51
FIB4 SCORE: 0.58

## 2023-12-13 ASSESSMENT — PAIN DESCRIPTION - PAIN TYPE: TYPE: ACUTE PAIN

## 2023-12-13 NOTE — TELEPHONE ENCOUNTER
PT called and said his Blood pressures have been low and that he has has been keeping an eye on it. He then told me his systolic pressure was in the 60's and he didn't feel well at all and was lying in bed.  was out of office so I asked  if he should go to the ER and he said yes. The PT said his wife would drive him to the emergency room.

## 2023-12-14 ENCOUNTER — APPOINTMENT (OUTPATIENT)
Dept: CARDIOLOGY | Facility: MEDICAL CENTER | Age: 53
End: 2023-12-14
Attending: GENERAL PRACTICE
Payer: COMMERCIAL

## 2023-12-14 ENCOUNTER — APPOINTMENT (OUTPATIENT)
Dept: RADIOLOGY | Facility: MEDICAL CENTER | Age: 53
End: 2023-12-14
Attending: INTERNAL MEDICINE
Payer: COMMERCIAL

## 2023-12-14 PROBLEM — N17.9 AKI (ACUTE KIDNEY INJURY) (HCC): Status: ACTIVE | Noted: 2023-12-14

## 2023-12-14 PROBLEM — R07.9 CHEST PAIN: Status: ACTIVE | Noted: 2023-12-14

## 2023-12-14 LAB
ALBUMIN SERPL BCP-MCNC: 3.7 G/DL (ref 3.2–4.9)
ALBUMIN/GLOB SERPL: 1.4 G/DL
ALP SERPL-CCNC: 58 U/L (ref 30–99)
ALT SERPL-CCNC: 19 U/L (ref 2–50)
ANION GAP SERPL CALC-SCNC: 13 MMOL/L (ref 7–16)
AST SERPL-CCNC: 12 U/L (ref 12–45)
BASOPHILS # BLD AUTO: 0.6 % (ref 0–1.8)
BASOPHILS # BLD: 0.03 K/UL (ref 0–0.12)
BILIRUB SERPL-MCNC: 0.4 MG/DL (ref 0.1–1.5)
BUN SERPL-MCNC: 39 MG/DL (ref 8–22)
CALCIUM ALBUM COR SERPL-MCNC: 9 MG/DL (ref 8.5–10.5)
CALCIUM SERPL-MCNC: 8.8 MG/DL (ref 8.5–10.5)
CHLORIDE SERPL-SCNC: 105 MMOL/L (ref 96–112)
CHOLEST SERPL-MCNC: 102 MG/DL (ref 100–199)
CO2 SERPL-SCNC: 21 MMOL/L (ref 20–33)
CREAT SERPL-MCNC: 1.89 MG/DL (ref 0.5–1.4)
CREAT UR-MCNC: 130.21 MG/DL
EKG IMPRESSION: NORMAL
EOSINOPHIL # BLD AUTO: 0.29 K/UL (ref 0–0.51)
EOSINOPHIL NFR BLD: 5.6 % (ref 0–6.9)
ERYTHROCYTE [DISTWIDTH] IN BLOOD BY AUTOMATED COUNT: 39.6 FL (ref 35.9–50)
EST. AVERAGE GLUCOSE BLD GHB EST-MCNC: 189 MG/DL
GFR SERPLBLD CREATININE-BSD FMLA CKD-EPI: 42 ML/MIN/1.73 M 2
GLOBULIN SER CALC-MCNC: 2.6 G/DL (ref 1.9–3.5)
GLUCOSE BLD STRIP.AUTO-MCNC: 183 MG/DL (ref 65–99)
GLUCOSE BLD STRIP.AUTO-MCNC: 199 MG/DL (ref 65–99)
GLUCOSE BLD STRIP.AUTO-MCNC: 200 MG/DL (ref 65–99)
GLUCOSE BLD STRIP.AUTO-MCNC: 212 MG/DL (ref 65–99)
GLUCOSE BLD STRIP.AUTO-MCNC: 239 MG/DL (ref 65–99)
GLUCOSE BLD STRIP.AUTO-MCNC: 244 MG/DL (ref 65–99)
GLUCOSE SERPL-MCNC: 185 MG/DL (ref 65–99)
HBA1C MFR BLD: 8.2 % (ref 4–5.6)
HCT VFR BLD AUTO: 36.9 % (ref 42–52)
HDLC SERPL-MCNC: 25 MG/DL
HGB BLD-MCNC: 12.7 G/DL (ref 14–18)
IMM GRANULOCYTES # BLD AUTO: 0.02 K/UL (ref 0–0.11)
IMM GRANULOCYTES NFR BLD AUTO: 0.4 % (ref 0–0.9)
LDLC SERPL CALC-MCNC: 29 MG/DL
LV EJECT FRACT  99904: 60
LYMPHOCYTES # BLD AUTO: 1.58 K/UL (ref 1–4.8)
LYMPHOCYTES NFR BLD: 30.7 % (ref 22–41)
MCH RBC QN AUTO: 30.8 PG (ref 27–33)
MCHC RBC AUTO-ENTMCNC: 34.4 G/DL (ref 32.3–36.5)
MCV RBC AUTO: 89.3 FL (ref 81.4–97.8)
MONOCYTES # BLD AUTO: 0.51 K/UL (ref 0–0.85)
MONOCYTES NFR BLD AUTO: 9.9 % (ref 0–13.4)
NEUTROPHILS # BLD AUTO: 2.72 K/UL (ref 1.82–7.42)
NEUTROPHILS NFR BLD: 52.8 % (ref 44–72)
NRBC # BLD AUTO: 0 K/UL
NRBC BLD-RTO: 0 /100 WBC (ref 0–0.2)
PLATELET # BLD AUTO: 212 K/UL (ref 164–446)
PMV BLD AUTO: 10.1 FL (ref 9–12.9)
POTASSIUM SERPL-SCNC: 4 MMOL/L (ref 3.6–5.5)
PROT SERPL-MCNC: 6.3 G/DL (ref 6–8.2)
RBC # BLD AUTO: 4.13 M/UL (ref 4.7–6.1)
SODIUM SERPL-SCNC: 139 MMOL/L (ref 135–145)
SODIUM UR-SCNC: 113 MMOL/L
TRIGL SERPL-MCNC: 242 MG/DL (ref 0–149)
TROPONIN T SERPL-MCNC: 23 NG/L (ref 6–19)
WBC # BLD AUTO: 5.2 K/UL (ref 4.8–10.8)

## 2023-12-14 PROCEDURE — 700102 HCHG RX REV CODE 250 W/ 637 OVERRIDE(OP): Performed by: INTERNAL MEDICINE

## 2023-12-14 PROCEDURE — 76775 US EXAM ABDO BACK WALL LIM: CPT

## 2023-12-14 PROCEDURE — 85025 COMPLETE CBC W/AUTO DIFF WBC: CPT

## 2023-12-14 PROCEDURE — 84165 PROTEIN E-PHORESIS SERUM: CPT

## 2023-12-14 PROCEDURE — 93005 ELECTROCARDIOGRAM TRACING: CPT | Performed by: INTERNAL MEDICINE

## 2023-12-14 PROCEDURE — A9270 NON-COVERED ITEM OR SERVICE: HCPCS | Performed by: GENERAL PRACTICE

## 2023-12-14 PROCEDURE — 36415 COLL VENOUS BLD VENIPUNCTURE: CPT

## 2023-12-14 PROCEDURE — 700111 HCHG RX REV CODE 636 W/ 250 OVERRIDE (IP)

## 2023-12-14 PROCEDURE — 84484 ASSAY OF TROPONIN QUANT: CPT

## 2023-12-14 PROCEDURE — 86334 IMMUNOFIX E-PHORESIS SERUM: CPT

## 2023-12-14 PROCEDURE — 82962 GLUCOSE BLOOD TEST: CPT | Mod: 91

## 2023-12-14 PROCEDURE — 84300 ASSAY OF URINE SODIUM: CPT

## 2023-12-14 PROCEDURE — 80053 COMPREHEN METABOLIC PANEL: CPT

## 2023-12-14 PROCEDURE — 82570 ASSAY OF URINE CREATININE: CPT

## 2023-12-14 PROCEDURE — 93306 TTE W/DOPPLER COMPLETE: CPT | Mod: 26 | Performed by: INTERNAL MEDICINE

## 2023-12-14 PROCEDURE — 700111 HCHG RX REV CODE 636 W/ 250 OVERRIDE (IP): Mod: JZ | Performed by: INTERNAL MEDICINE

## 2023-12-14 PROCEDURE — 83521 IG LIGHT CHAINS FREE EACH: CPT | Mod: 91

## 2023-12-14 PROCEDURE — 93010 ELECTROCARDIOGRAM REPORT: CPT | Performed by: STUDENT IN AN ORGANIZED HEALTH CARE EDUCATION/TRAINING PROGRAM

## 2023-12-14 PROCEDURE — 99233 SBSQ HOSP IP/OBS HIGH 50: CPT | Performed by: GENERAL PRACTICE

## 2023-12-14 PROCEDURE — 80061 LIPID PANEL: CPT

## 2023-12-14 PROCEDURE — 93306 TTE W/DOPPLER COMPLETE: CPT

## 2023-12-14 PROCEDURE — 84155 ASSAY OF PROTEIN SERUM: CPT | Mod: 91

## 2023-12-14 PROCEDURE — 83036 HEMOGLOBIN GLYCOSYLATED A1C: CPT

## 2023-12-14 PROCEDURE — G0378 HOSPITAL OBSERVATION PER HR: HCPCS

## 2023-12-14 PROCEDURE — A9270 NON-COVERED ITEM OR SERVICE: HCPCS | Performed by: INTERNAL MEDICINE

## 2023-12-14 PROCEDURE — 700102 HCHG RX REV CODE 250 W/ 637 OVERRIDE(OP): Performed by: GENERAL PRACTICE

## 2023-12-14 PROCEDURE — 78452 HT MUSCLE IMAGE SPECT MULT: CPT

## 2023-12-14 RX ORDER — TAMSULOSIN HYDROCHLORIDE 0.4 MG/1
0.4 CAPSULE ORAL
Status: DISCONTINUED | OUTPATIENT
Start: 2023-12-14 | End: 2023-12-14

## 2023-12-14 RX ORDER — GLUCOSAMINE HCL/CHONDROITIN SU 500-400 MG
CAPSULE ORAL
Qty: 100 EACH | Refills: 0 | Status: SHIPPED
Start: 2023-12-14 | End: 2023-12-15

## 2023-12-14 RX ORDER — REGADENOSON 0.08 MG/ML
INJECTION, SOLUTION INTRAVENOUS
Status: COMPLETED
Start: 2023-12-14 | End: 2023-12-14

## 2023-12-14 RX ORDER — TRAMADOL HYDROCHLORIDE 50 MG/1
50 TABLET ORAL EVERY 6 HOURS PRN
Status: DISCONTINUED | OUTPATIENT
Start: 2023-12-14 | End: 2023-12-15 | Stop reason: HOSPADM

## 2023-12-14 RX ORDER — LANCETS 30 GAUGE
EACH MISCELLANEOUS
Qty: 100 EACH | Refills: 0 | Status: SHIPPED
Start: 2023-12-14 | End: 2023-12-15

## 2023-12-14 RX ADMIN — INSULIN HUMAN 4 UNITS: 100 INJECTION, SOLUTION PARENTERAL at 17:31

## 2023-12-14 RX ADMIN — ATORVASTATIN CALCIUM 80 MG: 80 TABLET, FILM COATED ORAL at 06:08

## 2023-12-14 RX ADMIN — INSULIN HUMAN 4 UNITS: 100 INJECTION, SOLUTION PARENTERAL at 20:40

## 2023-12-14 RX ADMIN — REGADENOSON 0.4 MG: 0.08 INJECTION, SOLUTION INTRAVENOUS at 10:17

## 2023-12-14 RX ADMIN — INSULIN HUMAN 4 UNITS: 100 INJECTION, SOLUTION PARENTERAL at 13:32

## 2023-12-14 RX ADMIN — TAMSULOSIN HYDROCHLORIDE 0.4 MG: 0.4 CAPSULE ORAL at 08:25

## 2023-12-14 RX ADMIN — ASPIRIN 81 MG: 81 TABLET, CHEWABLE ORAL at 20:35

## 2023-12-14 RX ADMIN — ENOXAPARIN SODIUM 40 MG: 100 INJECTION SUBCUTANEOUS at 17:33

## 2023-12-14 RX ADMIN — GABAPENTIN 200 MG: 100 CAPSULE ORAL at 06:08

## 2023-12-14 RX ADMIN — GABAPENTIN 200 MG: 100 CAPSULE ORAL at 17:34

## 2023-12-14 ASSESSMENT — ENCOUNTER SYMPTOMS
HEARTBURN: 0
DOUBLE VISION: 0
ORTHOPNEA: 0
POLYDIPSIA: 0
NECK PAIN: 0
FLANK PAIN: 0
SPEECH CHANGE: 0
BRUISES/BLEEDS EASILY: 0
NAUSEA: 0
VOMITING: 0
HEADACHES: 0
WEIGHT LOSS: 0
TREMORS: 0
COUGH: 0
HALLUCINATIONS: 0
CHILLS: 0
FOCAL WEAKNESS: 0
NERVOUS/ANXIOUS: 0
PHOTOPHOBIA: 0
SPUTUM PRODUCTION: 0
WEAKNESS: 1
DIZZINESS: 1
BLURRED VISION: 0
BACK PAIN: 0
HEMOPTYSIS: 0
FEVER: 0
PALPITATIONS: 0

## 2023-12-14 ASSESSMENT — PAIN DESCRIPTION - PAIN TYPE
TYPE: ACUTE PAIN

## 2023-12-14 ASSESSMENT — LIFESTYLE VARIABLES: SUBSTANCE_ABUSE: 0

## 2023-12-14 ASSESSMENT — FIBROSIS 4 INDEX: FIB4 SCORE: 0.68

## 2023-12-14 NOTE — ED NOTES
Bedside report received from off going RN/tech: Jackson Hardy, assumed care of patient.  POC discussed with patient. Call light within reach, all needs addressed at this time.       Fall risk interventions in place: Move the patient closer to the nurse's station, Patient's personal possessions are with in their safe reach, Place socks on patient, Place fall risk sign on patient's door, and Keep floor surfaces clean and dry (all applicable per [place] Fall risk assessment)   Continuous monitoring: Cardiac Leads, Pulse Ox, or Blood Pressure  IVF/IV medications: Not Applicable   Oxygen: Room Air  Bedside sitter: Not Applicable   Isolation: Not Applicable

## 2023-12-14 NOTE — H&P
Hospital Medicine History & Physical Note    Date of Service  12/13/2023    Primary Care Physician  Pablo Cordova M.D.        Code Status  Full Code    Chief Complaint  Chief Complaint   Patient presents with    Low Blood Sugar     Patient's meter is ready 62. BGL in triage is 132.     Hypotension     Patient repots that at home today his BP has been low and had a home reading 71/50 with a feeling of fatigue. Reports near syncopal episode.        History of Presenting Illness  Andrez Sanchez is a 53 y.o. male with past medical history of type 2 diabetes, hypertension, dyslipidemia, obesity, CKD 3, who presented 12/13/2023 with complaints of generalized weakness, concern for hypotension and hypoglycemia.  At home he has been having near syncopal episode, fatigue, blood pressure in 80s systolic.  His PCP stopped amlodipine a few weeks ago, he continue taking metoprolol, valsartan/hydrochlorothiazide  His continuous glucose monitoring device was showing blood sugar 50s  He is on metformin, glipizide, Jardiance.  Upon arrival to ER his blood pressure more or less stable in the position laying flat.  His blood sugar is 139, while his implanted glucose monitoring device showed blood sugar 56.  It was concluded that it does not work correctly.  Blood work showed worsening of creatinine 2.38, BUN 37, to compare with 1.43/27 in March.  Troponin 27  Upon questioning, patient has been having intermittent mild chest discomfort on and off without triggering, aggravating or elevating factors in the last several weeks.  On EKG there is no concern for STEMI or NSTEMI.  No significant changes from prior  I discussed the plan of care with patient and ERP .    Review of Systems  Review of Systems   Constitutional:  Negative for chills, fever and weight loss.   HENT:  Negative for ear pain, hearing loss and tinnitus.    Eyes:  Negative for blurred vision, double vision and photophobia.   Respiratory:  Negative for cough,  hemoptysis and sputum production.    Cardiovascular:  Negative for chest pain, palpitations and orthopnea.   Gastrointestinal:  Negative for heartburn, nausea and vomiting.   Genitourinary:  Negative for dysuria, flank pain, frequency and hematuria.   Musculoskeletal:  Negative for back pain, joint pain and neck pain.   Skin:  Negative for itching and rash.   Neurological:  Positive for dizziness and weakness. Negative for tremors, speech change, focal weakness and headaches.   Endo/Heme/Allergies:  Negative for environmental allergies and polydipsia. Does not bruise/bleed easily.   Psychiatric/Behavioral:  Negative for hallucinations and substance abuse. The patient is not nervous/anxious.        Past Medical History   has a past medical history of Diabetes (HCC), Hyperlipidemia, Hypertension, and Obesity.    Surgical History   has a past surgical history that includes av fistula revision (1973) and uvulopharyngopalatoplasty (2006).     Family History  family history includes Cancer in his mother; Diabetes in his paternal grandmother.   Family history reviewed with patient. There is no family history that is pertinent to the chief complaint.     Social History   reports that he has never smoked. He has never used smokeless tobacco. He reports that he does not currently use alcohol. He reports that he does not use drugs.    Allergies  No Known Allergies    Medications  Prior to Admission Medications   Prescriptions Last Dose Informant Patient Reported? Taking?   ASPIRIN LOW DOSE 81 MG Chew Tab chewable tablet   No No   Sig: CHEW 1 TABLET DAILY   JARDIANCE 25 MG Tab   No No   Sig: TAKE 1 TABLET DAILY   Omega-3 Fatty Acids (FISH OIL) 1000 MG Cap capsule   Yes No   Sig: Take 1,000 mg by mouth 3 times a day with meals.   Semaglutide, 1 MG/DOSE, (OZEMPIC, 1 MG/DOSE,) 4 MG/3ML Solution Pen-injector   No No   Sig: Inject 1 mg subcutaneously q. 7 days   amLODIPine (NORVASC) 5 MG Tab   No No   Sig: TAKE 1 TABLET DAILY    atorvastatin (LIPITOR) 80 MG tablet   No No   Sig: TAKE 1 TABLET EVERY NIGHT   gabapentin (NEURONTIN) 300 MG Cap   No No   Sig: TAKE 4 CAPSULES IN THE MORNING AND 4 CAPSULES AT NIGHT, TWICE A DAY   glipiZIDE (GLUCOTROL) 5 MG Tab   No No   Sig: TAKE 1 TABLET DAILY   metformin (GLUCOPHAGE) 1000 MG tablet   No No   Sig: Take 1 Tablet by mouth 2 times a day with meals.   metoprolol SR (TOPROL XL) 100 MG TABLET SR 24 HR   No No   Sig: TAKE 1 TABLET DAILY   omeprazole (PRILOSEC) 20 MG delayed-release capsule   No No   Sig: Take 1 Capsule by mouth every day.   tamsulosin (FLOMAX) 0.4 MG capsule   No No   Sig: TAKE 1 CAPSULE 1/2 HOUR AFTER BREAKFAST   valsartan-hydrochlorothiazide (DIOVAN-HCT) 320-25 MG per tablet   No No   Sig: TAKE 1 TABLET EVERY 24 HOURS   vitamin D (CHOLECALCIFEROL) 1000 Unit Tab   No No   Sig: Take 2 Tabs by mouth every day.      Facility-Administered Medications: None       Physical Exam  Temp:  [36.1 °C (96.9 °F)] 36.1 °C (96.9 °F)  Pulse:  [64-93] 76  Resp:  [14-19] 16  BP: ()/(56-91) 119/81  SpO2:  [89 %-96 %] 94 %  Blood Pressure: 119/81   Temperature: 36.1 °C (96.9 °F)   Pulse: 76   Respiration: 16   Pulse Oximetry: 94 %       Physical Exam  Vitals and nursing note reviewed.   Constitutional:       General: He is not in acute distress.     Appearance: Normal appearance. He is obese.   HENT:      Head: Normocephalic and atraumatic.      Nose: Nose normal.      Mouth/Throat:      Mouth: Mucous membranes are moist.   Eyes:      Extraocular Movements: Extraocular movements intact.      Pupils: Pupils are equal, round, and reactive to light.   Cardiovascular:      Rate and Rhythm: Normal rate and regular rhythm.   Pulmonary:      Effort: Pulmonary effort is normal.      Breath sounds: Normal breath sounds.   Abdominal:      General: Abdomen is flat. There is no distension.      Tenderness: There is no abdominal tenderness.   Musculoskeletal:         General: No swelling or deformity. Normal  "range of motion.      Cervical back: Normal range of motion and neck supple.   Skin:     General: Skin is warm and dry.   Neurological:      General: No focal deficit present.      Mental Status: He is alert and oriented to person, place, and time.   Psychiatric:         Mood and Affect: Mood normal.         Behavior: Behavior normal.         Laboratory:  Recent Labs     12/13/23 1813   WBC 6.0   RBC 4.52*   HEMOGLOBIN 14.2   HEMATOCRIT 41.0*   MCV 90.7   MCH 31.4   MCHC 34.6   RDW 40.1   PLATELETCT 259   MPV 10.1     Recent Labs     12/13/23  1813   SODIUM 141   POTASSIUM 4.1   CHLORIDE 104   CO2 22   GLUCOSE 139*   BUN 37*   CREATININE 2.38*   CALCIUM 9.7     Recent Labs     12/13/23 1813   ALTSGPT 22   ASTSGOT 12   ALKPHOSPHAT 62   TBILIRUBIN 0.6   GLUCOSE 139*         No results for input(s): \"NTPROBNP\" in the last 72 hours.      Recent Labs     12/13/23 1813   TROPONINT 27*       Imaging:  DX-CHEST-PORTABLE (1 VIEW)   Final Result      No acute cardiac or pulmonary abnormalities are identified.      US-RENAL    (Results Pending)       X-Ray:  I have personally reviewed the images and compared with prior images.    Assessment/Plan:  Justification for Admission Status  I anticipate this patient is appropriate for observation status at this time because chest pain    Patient will need a Telemetry bed on MEDICAL service .  The need is secondary to chest pain.    * Hypotension- (present on admission)  Assessment & Plan  Will hold all blood pressure medications due to concern for medication induced orthostatic hypotension  He was given 1 L of crystalloid in ER  Repeat orthostatic vitals tomorrow      KELLE (acute kidney injury) (HCC) on CKD stage III  Assessment & Plan  Creatinine 2.38, BUN 37, above baseline  May be related to hypoperfusion due to hypotension or hypovolemia  Will check bladder scan and kidney ultrasound, UA and urine electrolytes  Avoid nephrotoxins    Chest pain and elevated troponin  Assessment & " Plan  Presented with atypical chest pain and mildly elevated troponin  No concern for ACS, although patient has multiple risk factors  Will evaluate with stress test and echo  Repeat troponin and EKG        Diabetes mellitus without complication (HCC)- (present on admission)  Assessment & Plan  In the setting of worsening of kidney function glipizide and metformin should be discontinued  As noted above, patient had pseudohypoglycemia due to malfunctioning glucose monitoring device  Continue fingersticks every 4 hours  Hypoglycemia protocol    Essential hypertension- (present on admission)  Assessment & Plan  Restart BP medications tomorrow gradually if blood pressure goes up  He will need close follow-up with PCP and self-monitoring of blood pressure at home        VTE prophylaxis: heparin ppx

## 2023-12-14 NOTE — PROGRESS NOTES
4 Eyes Skin Assessment Completed by MADY Gamez and Angelika RN.    Head WDL  Ears WDL  Nose WDL  Mouth WDL  Neck WDL  Breast/Chest WDL  Shoulder Blades WDL  Spine WDL  (R) Arm/Elbow/Hand WDL  (L) Arm/Elbow/Hand WDL  Abdomen WDL  Groin WDL  Scrotum/Coccyx/Buttocks WDL  (R) Leg WDL  (L) Leg WDL  (R) Heel/Foot/Toe WDL  (L) Heel/Foot/Toe WDL          Devices In Places Tele Box, Blood Pressure Cuff, and Pulse Ox      Interventions In Place Pillows and Barrier Cream    Possible Skin Injury No    Pictures Uploaded Into Epic N/A  Wound Consult Placed N/A  RN Wound Prevention Protocol Ordered No

## 2023-12-14 NOTE — ED NOTES
Patient to room from lobby in wheelchair. Changed into gown, connected to monitor. Agree with triage note. Patient normotensive in ER room. Denies symptoms of hypoglycemia. Charted for ERP. Call light within reach. Son at bedside.

## 2023-12-14 NOTE — ED PROVIDER NOTES
ED Provider Note    CHIEF COMPLAINT  Chief Complaint   Patient presents with    Low Blood Sugar     Patient's meter is ready 62. BGL in triage is 132.     Hypotension     Patient repots that at home today his BP has been low and had a home reading 71/50 with a feeling of fatigue. Reports near syncopal episode.        EXTERNAL RECORDS REVIEWED  Outpatient Notes Patient was seen in clinic on the 17th October.  He has a history of hypotension due to antihypertensives.  At that time he was recommended to stop amlodipine and continue with metoprolol and Diovan/HCT.  The patient also has a history of diabetes he is on metformin as well as semaglutide and jiardiance 25 mg daily     HPI/ROS  LIMITATION TO HISTORY   Select: : None  OUTSIDE HISTORIAN(S):  None    Andrez Sanchez is a 53 y.o. male who presents with low blood sugars and low blood pressures.  He says that his blood pressures have been low for 'months'.  He says that he saw his PCP in October who took him off of the amlodipine.  He says that he measures his blood pressure and it has not been above 117.  He says it is typically around 100 systolics.  He said it has been dropping however and today has been the 80s.  He does feel lightheaded and dizzy. He also has fatigue.  He does get intermittent palpitations and a chest discomfort that last for about 30 seconds and goes away.  He denies any symptoms at this time and also denies any exertional symptoms.  Patient also had blood sugar today of 62 by his BGL monitor.  He says that he had pulled pork and lemonade for lunch and has had normal PO intake.  On recheck his blood sugar improved without intervention.  Patient denies taking any additional medications having any of his medications changed recently.  He does however state that he used to drink about 20 x Dr. Pepper's a day however now he stopped this in an attempt to avoid having to go onto insulin.  He denies any infectious symptoms or recent illness.   "Specifically he has no fevers, chills, URI symptoms or vomiting.      PAST MEDICAL HISTORY   has a past medical history of Diabetes (HCC), Hyperlipidemia, Hypertension, and Obesity.    SURGICAL HISTORY   has a past surgical history that includes av fistula revision (1973) and uvulopharyngopalatoplasty (2006).    FAMILY HISTORY  Family History   Problem Relation Age of Onset    Cancer Mother     Diabetes Paternal Grandmother        SOCIAL HISTORY  Social History     Tobacco Use    Smoking status: Never    Smokeless tobacco: Never   Vaping Use    Vaping Use: Never used   Substance and Sexual Activity    Alcohol use: Not Currently     Comment: rare    Drug use: No    Sexual activity: Yes     Partners: Female     Birth control/protection: Condom     Comment: wife had double mastectomy for breast cancer       CURRENT MEDICATIONS  Home Medications       Reviewed by Daniel Canales (Pharmacy Tech) on 12/13/23 at 2107  Med List Status: Complete     Medication Last Dose Status   aspirin 81 MG EC tablet 12/13/2023 Active   atorvastatin (LIPITOR) 80 MG tablet 12/13/2023 Active   Cholecalciferol (VITAMIN D3) 50 MCG (2000 UT) Tab 12/13/2023 Active   gabapentin (NEURONTIN) 300 MG Cap 12/13/2023 Active   glipiZIDE (GLUCOTROL) 5 MG Tab 12/13/2023 Active   JARDIANCE 25 MG Tab 12/13/2023 Active   metformin (GLUCOPHAGE) 1000 MG tablet 12/13/2023 Active   metoprolol SR (TOPROL XL) 100 MG TABLET SR 24 HR 12/13/2023 Active   Semaglutide, 1 MG/DOSE, (OZEMPIC, 1 MG/DOSE,) 4 MG/3ML Solution Pen-injector 12/9/2023 Active   tamsulosin (FLOMAX) 0.4 MG capsule 12/13/2023 Active   valsartan-hydrochlorothiazide (DIOVAN-HCT) 320-25 MG per tablet 12/13/2023 Active                    ALLERGIES  No Known Allergies    PHYSICAL EXAM  VITAL SIGNS: /81   Pulse 76   Temp 36.1 °C (96.9 °F) (Temporal)   Resp 16   Ht 1.778 m (5' 10\")   Wt 118 kg (260 lb)   SpO2 94%   BMI 37.31 kg/m²    Constitutional: Awake and alert.  Non toxic  HENT: " Normal inspection    Eyes: Normal inspection  Neck: Grossly normal range of motion.  Cardiovascular: Normal heart rate, Normal rhythm.  Symmetric peripheral pulses.   Thorax & Lungs: No respiratory distress, No wheezing, No rales, No rhonchi  Abdomen: Soft, non-distended, nontender to palpation in all 4 quadrants, no mass  Skin: No obvious rash.  Extremities: Warm, well perfused. No clubbing, cyanosis, edema   Neurologic: Grossly normal   Psychiatric: Normal for situation      DIAGNOSTIC STUDIES / PROCEDURES  EKG  I have independently interpreted this EKG  Results for orders placed or performed during the hospital encounter of 23   EKG (NOW)   Result Value Ref Range    Report       [facility] Emergency Dept.    Test Date:  2023  Pt Name:    CARROL GOSS                Department: ER  MRN:        [id number]                      Room:  Gender:     Male                         Technician: [id number]  :        1970                   Requested By:ER TRIAGE PROTOCOL  Order #:    [id number]                    Reading MD: Herlinda Márquez    Measurements  Intervals                                Axis  Rate:       89                           P:          -11  DC:         176                          QRS:        -21  QRSD:       96                           T:          34  QT:         358  QTc:        436    Interpretive Statements  Sinus rhythm  Borderline left axis deviation  RSR' in V1 or V2, right VCD or RVH  Compared to ECG 2016 08:16:11  No significant changes  Electronically Signed On 2023 20:32:51 PST by Herlinda Márquez           LABS  Results for orders placed or performed during the hospital encounter of 23   CBC WITH DIFFERENTIAL   Result Value Ref Range    WBC 6.0 4.8 - 10.8 K/uL    RBC 4.52 (L) 4.70 - 6.10 M/uL    Hemoglobin 14.2 14.0 - 18.0 g/dL    Hematocrit 41.0 (L) 42.0 - 52.0 %    MCV 90.7 81.4 - 97.8 fL    MCH 31.4 27.0 - 33.0 pg    MCHC 34.6 32.3 - 36.5 g/dL    RDW  40.1 35.9 - 50.0 fL    Platelet Count 259 164 - 446 K/uL    MPV 10.1 9.0 - 12.9 fL    Neutrophils-Polys 54.30 44.00 - 72.00 %    Lymphocytes 29.90 22.00 - 41.00 %    Monocytes 9.70 0.00 - 13.40 %    Eosinophils 5.20 0.00 - 6.90 %    Basophils 0.70 0.00 - 1.80 %    Immature Granulocytes 0.20 0.00 - 0.90 %    Nucleated RBC 0.00 0.00 - 0.20 /100 WBC    Neutrophils (Absolute) 3.25 1.82 - 7.42 K/uL    Lymphs (Absolute) 1.79 1.00 - 4.80 K/uL    Monos (Absolute) 0.58 0.00 - 0.85 K/uL    Eos (Absolute) 0.31 0.00 - 0.51 K/uL    Baso (Absolute) 0.04 0.00 - 0.12 K/uL    Immature Granulocytes (abs) 0.01 0.00 - 0.11 K/uL    NRBC (Absolute) 0.00 K/uL   COMP METABOLIC PANEL   Result Value Ref Range    Sodium 141 135 - 145 mmol/L    Potassium 4.1 3.6 - 5.5 mmol/L    Chloride 104 96 - 112 mmol/L    Co2 22 20 - 33 mmol/L    Anion Gap 15.0 7.0 - 16.0    Glucose 139 (H) 65 - 99 mg/dL    Bun 37 (H) 8 - 22 mg/dL    Creatinine 2.38 (H) 0.50 - 1.40 mg/dL    Calcium 9.7 8.5 - 10.5 mg/dL    Correct Calcium 9.5 8.5 - 10.5 mg/dL    AST(SGOT) 12 12 - 45 U/L    ALT(SGPT) 22 2 - 50 U/L    Alkaline Phosphatase 62 30 - 99 U/L    Total Bilirubin 0.6 0.1 - 1.5 mg/dL    Albumin 4.3 3.2 - 4.9 g/dL    Total Protein 7.0 6.0 - 8.2 g/dL    Globulin 2.7 1.9 - 3.5 g/dL    A-G Ratio 1.6 g/dL   TROPONIN   Result Value Ref Range    Troponin T 27 (H) 6 - 19 ng/L   URINALYSIS (UA)    Specimen: Urine   Result Value Ref Range    Color Yellow     Character Clear     Specific Gravity 1.034 <1.035    Ph 5.0 5.0 - 8.0    Glucose 500 (A) Negative mg/dL    Ketones Trace (A) Negative mg/dL    Protein Negative Negative mg/dL    Bilirubin Negative Negative    Urobilinogen, Urine 0.2 Negative    Nitrite Negative Negative    Leukocyte Esterase Negative Negative    Occult Blood Negative Negative    Micro Urine Req see below    LACTIC ACID   Result Value Ref Range    Lactic Acid 2.7 (H) 0.5 - 2.0 mmol/L   ESTIMATED GFR   Result Value Ref Range    GFR (CKD-EPI) 32 (A) >60  mL/min/1.73 m 2   TROPONIN   Result Value Ref Range    Troponin T 25 (H) 6 - 19 ng/L   EKG (NOW)   Result Value Ref Range    Report       [facility] Emergency Dept.    Test Date:  2023  Pt Name:    CARROL GOSS                Department: ER  MRN:        [id number]                      Room:  Gender:     Male                         Technician: [id number]  :        1970                   Requested By:ER TRIAGE PROTOCOL  Order #:    [id number]                    Reading MD: Herlinda Márquez    Measurements  Intervals                                Axis  Rate:       89                           P:          -11  WV:         176                          QRS:        -21  QRSD:       96                           T:          34  QT:         358  QTc:        436    Interpretive Statements  Sinus rhythm  Borderline left axis deviation  RSR' in V1 or V2, right VCD or RVH  Compared to ECG 2016 08:16:11  No significant changes  Electronically Signed On 2023 20:32:51 PST by Herlinda Márquez     POCT glucose device results   Result Value Ref Range    POC Glucose, Blood 132 (H) 65 - 99 mg/dL   POCT glucose device results   Result Value Ref Range    POC Glucose, Blood 106 (H) 65 - 99 mg/dL         RADIOLOGY  I have independently interpreted the diagnostic imaging associated with this visit and am waiting the final reading from the radiologist.   My preliminary interpretation is as follows: Normal appearing mediastinum   Radiologist interpretation:   DX-CHEST-PORTABLE (1 VIEW)   Final Result      No acute cardiac or pulmonary abnormalities are identified.      US-RENAL    (Results Pending)   NM-CARDIAC STRESS TEST    (Results Pending)         COURSE & MEDICAL DECISION MAKING    ED Observation Status? Yes; I am placing the patient in to an observation status due to a diagnostic uncertainty as well as therapeutic intensity. Patient placed in observation status at 6:30 PM, 2023.     Observation plan is  as follows: IV, Labs, EKG    Upon Reevaluation, the patient's condition has: not improved; and will be escalated to hospitalization.    Patient discharged from ED Observation status at 8:33 PM 12/13/2023    INITIAL ASSESSMENT, COURSE AND PLAN  Care Narrative: This is a 53-year-old male with history of hypertension and diabetes who presents with low blood pressures and low blood sugar readings at home.  On arrival, his blood pressure is low in the 90s systolic but he otherwise has normal vital signs.  He is neurologically intact.  Of note patient with multiple laboratory derangements including an KELLE and a slightly elevated troponin of 27.  His x-ray is unremarkable.  My suspicion is patient's blood pressure is low due to his antihypertensives.  He stopped amlodipine a couple months ago but is still taking metoprolol as well as valsartan and hydrochlorothiazide. I also considered possible dehydration.  He is not septic and has no associated symptoms.   His blood sugars here have been within normal range.  There appears to be a discrepancy between his blood glucose monitor and all readings and therefore I doubt that he has been hypoglycemic.  He is not septic and has no other signs or symptoms consistent with infection.   Unclear exactly the etiology of the patient's KELLE.  It is possibly due to dehydration especially in the setting of near syncope earlier today.  He was given a fluid bolus here and does report some improvement and his blood pressures have improved.  I do think he needs to be admitted to the hospital for further workup of this.  Regarding his elevated troponin unclear chronicity of this.  He is not having any chest pain at this time and EKG is nonischemic.  I have low suspicion for ACS.  Repeat troponin is stable.  I do feel reassured that he is not having any chest pain at this time.  I discussed the case with Dr. Forbes who will admit the patient for further management and ACS  workup      HYDRATION: Based on the patient's presentation of Hypotension the patient was given IV fluids. IV Hydration was used because oral hydration was not adequate alone. Upon recheck following hydration, the patient was improved.        DISPOSITION AND DISCUSSIONS  I have discussed management of the patient with the following physicians and JOSS's:  Dr. Forbes    Discussion of management with other [facility] or appropriate source(s): None     Decision tools and prescription drugs considered including, but not limited to: HEART Score 4 .    FINAL DIAGNOSIS  1. KELLE (acute kidney injury) (HCC) Acute   2. Elevated troponin Acute   3. Hypotension, unspecified hypotension type Acute          Electronically signed by: Herlinda Márquez M.D., 12/13/2023 6:05 PM

## 2023-12-14 NOTE — ED NOTES
IV access placed. Blood drawn and sent to lab. Medicated patient per MAR. Patient denies further needs. Call light within reach. Xray at bedside.

## 2023-12-14 NOTE — ASSESSMENT & PLAN NOTE
Will restart blood pressure medications as tolerated  Patient to purchase himself a blood pressure cuff, keep a daily log and present to his PCP for further management of his blood pressure

## 2023-12-14 NOTE — ASSESSMENT & PLAN NOTE
Will hold all blood pressure medications due to concern for medication induced orthostatic hypotension  He was given 1 L of crystalloid in ER  Orthostatic vitals

## 2023-12-14 NOTE — ASSESSMENT & PLAN NOTE
In the setting of worsening of kidney function glipizide and metformin should be discontinued  As noted above, patient had pseudohypoglycemia due to malfunctioning glucose monitoring device  Continue fingersticks every 4 hours  Hypoglycemia protocol, ISS  Previous A1c in June 2023 noted 13  Repeat A1c pending  Patient to be initiated on insulin therapy

## 2023-12-14 NOTE — ED NOTES
Bedside report given to MADY Wood. Patient care transferred. Call light within reach. Son at bedside. Side rails up.

## 2023-12-14 NOTE — HOSPITAL COURSE
This is a 53-year-old male with past medical history of hypertension, dyslipidemia, type 2 diabetes A1c 8.2 and obesity with a BMI of 38 who presented to the ED on 12/13/2023 with generalized weakness concern for hypotension following a near syncopal episode.    Patient's primary care discontinued his amlodipine a few weeks ago, he continues to take metoprolol, valsartan, and hydrochlorothiazide. Blood pressures noted to be in the systolic 80s.      Patient reports over the past several weeks, has been having intermittent chest pain.  EKG with no evidence of acute ischemic changes. Nuclear stress test negative for ischemia. TTE noted moderate LVH, calcified aortic valve leaflets, tricuspid aortic valve, mild aortic valve stenosis, transvalvular gradients are peak of 19 mmHg, mean of 12 mmHg.  Concern for possible infiltrative cardiomyopathy, rule out amyloidosis.  Case discussed with oncology, SPEP, UPEP, monoclonal protein ordered. . Case discussed with cardiology, recommend outpatient imaging. Cardiac monitor ordered and placed prior to discharge.    Labs significant for KELLE, likely secondary to hypovolemia perfusion due to hypotension.  Renal ultrasound unremarkable. Holding all antihypertensive medications at this time.  On day of discharge, renal function returned to baseline.

## 2023-12-14 NOTE — ASSESSMENT & PLAN NOTE
Presented with atypical chest pain and mildly elevated troponin  No concern for ACS, although patient has multiple risk factors  EKG with no evidence of acute ischemic changes.    TTE and stress test ordered.

## 2023-12-14 NOTE — ED NOTES
Med Rec complete per patient with med list on phone   Allergies reviewed  Antibiotics in the past 30 days:no  Anticoagulant in past 14 days:no  Pharmacy patient utilizes:[facility] in [place]    Pt states he takes all his oral medications in the morning

## 2023-12-14 NOTE — PROGRESS NOTES
Hospital Medicine Daily Progress Note    Date of Service  12/14/2023    Chief Complaint  Andrez Sanchez is a 53 y.o. male admitted 12/13/2023 with near syncope    Hospital Course  This is a 53-year-old male with past medical history of hypertension, dyslipidemia, type 2 diabetes A1c, CKD 3a and obesity with a BMI of 38 who presented to the ED on 12/13/2023 with generalized weakness concern for hypotension following a near syncopal episode.    Patient's primary care discontinued his amlodipine a few weeks ago, he continues to take metoprolol, valsartan, and hydrochlorothiazide.  Blood pressures noted to be in the systolic 80s.      Patient on metformin, glipizide and Jardiance, sugars noted to be in the 50s.  Patient's Dexcom was reading in the 50s, however POC checked here in the ER noted blood sugar of 139, likely device is not working correctly.    Patient reports over the past several weeks, has been having intermittent chest pain.  EKG with no evidence of acute ischemic changes.  TTE and stress test ordered.    Labs significant for KELLE on CKD, likely secondary to hypovolemia perfusion due to hypotension.  Renal ultrasound unremarkable. Holding all antihypertensive medications at this time.    Interval Problem Update  Patient reports over the past several weeks, has been having intermittent chest pain.  EKG with no evidence of acute ischemic changes.  TTE and stress test ordered.    Labs significant for KELLE on CKD, likely secondary to hypovolemia perfusion due to hypotension.  Renal ultrasound unremarkable. Holding all antihypertensive medications at this time.  Discontinue patient's metformin given worsening renal function.    A1c pending, last A1c was 13, if uncontrolled, patient will need to be started on insulin therapy.  Diabetic educator consulted.    I have discussed this patient's plan of care and discharge plan at IDT rounds today with Case Management, Nursing, Nursing leadership, and other members  of the IDT team.    Consultants/Specialty  None    Code Status  Full Code    Disposition  The patient is not medically cleared for discharge to home or a post-acute facility.  Anticipate discharge to: home with close outpatient follow-up    I have placed the appropriate orders for post-discharge needs.    Review of Systems  Review of Systems   All other systems reviewed and are negative.       Physical Exam  Temp:  [36.1 °C (96.9 °F)-36.2 °C (97.2 °F)] 36.2 °C (97.2 °F)  Pulse:  [64-93] 83  Resp:  [14-19] 16  BP: ()/(56-95) 123/87  SpO2:  [89 %-96 %] 92 %    Physical Exam  Vitals and nursing note reviewed.   Constitutional:       General: He is not in acute distress.     Appearance: Normal appearance.   HENT:      Head: Normocephalic and atraumatic.   Eyes:      Extraocular Movements: Extraocular movements intact.      Conjunctiva/sclera: Conjunctivae normal.      Pupils: Pupils are equal, round, and reactive to light.   Cardiovascular:      Rate and Rhythm: Normal rate and regular rhythm.      Pulses: Normal pulses.      Heart sounds: No murmur heard.     No friction rub. No gallop.   Pulmonary:      Effort: Pulmonary effort is normal. No respiratory distress.      Breath sounds: Normal breath sounds. No wheezing, rhonchi or rales.   Abdominal:      General: Bowel sounds are normal. There is no distension.      Palpations: Abdomen is soft.      Tenderness: There is no abdominal tenderness.   Musculoskeletal:         General: No swelling or tenderness. Normal range of motion.      Cervical back: Normal range of motion and neck supple. No muscular tenderness.      Right lower leg: No edema.      Left lower leg: No edema.   Skin:     General: Skin is warm and dry.      Capillary Refill: Capillary refill takes less than 2 seconds.      Findings: No bruising, erythema or rash.   Neurological:      General: No focal deficit present.      Mental Status: He is alert and oriented to person, place, and time.          Fluids    Intake/Output Summary (Last 24 hours) at 12/14/2023 1130  Last data filed at 12/14/2023 0900  Gross per 24 hour   Intake 240 ml   Output 1325 ml   Net -1085 ml       Laboratory  Recent Labs     12/13/23  1813 12/14/23  0203   WBC 6.0 5.2   RBC 4.52* 4.13*   HEMOGLOBIN 14.2 12.7*   HEMATOCRIT 41.0* 36.9*   MCV 90.7 89.3   MCH 31.4 30.8   MCHC 34.6 34.4   RDW 40.1 39.6   PLATELETCT 259 212   MPV 10.1 10.1     Recent Labs     12/13/23  1813 12/14/23  0203   SODIUM 141 139   POTASSIUM 4.1 4.0   CHLORIDE 104 105   CO2 22 21   GLUCOSE 139* 185*   BUN 37* 39*   CREATININE 2.38* 1.89*   CALCIUM 9.7 8.8             Recent Labs     12/14/23  0203   TRIGLYCERIDE 242*   HDL 25*   LDL 29       Imaging  NM-CARDIAC STRESS TEST         US-RENAL   Final Result         1.  Normal renal ultrasound.      DX-CHEST-PORTABLE (1 VIEW)   Final Result      No acute cardiac or pulmonary abnormalities are identified.      EC-ECHOCARDIOGRAM COMPLETE W/O CONT    (Results Pending)        Assessment/Plan  * Hypotension- (present on admission)  Assessment & Plan  Will hold all blood pressure medications due to concern for medication induced orthostatic hypotension  He was given 1 L of crystalloid in ER  Orthostatic vitals      KELLE (acute kidney injury) (HCC) on CKD stage III  Assessment & Plan  Labs significant for KELLE on CKD, likely secondary to hypovolemia perfusion due to hypotension.  Renal ultrasound unremarkable. Holding all antihypertensive medications at this time.  Avoid nephrotoxins    Chest pain and elevated troponin  Assessment & Plan  Presented with atypical chest pain and mildly elevated troponin  No concern for ACS, although patient has multiple risk factors  EKG with no evidence of acute ischemic changes.    TTE and stress test ordered.        Benign prostatic hyperplasia without lower urinary tract symptoms- (present on admission)  Assessment & Plan  Restart Flomax    Neuropathy- (present on admission)  Assessment  & Plan  Restarted gabapentin, renally dosed    Diabetes mellitus without complication (HCC)- (present on admission)  Assessment & Plan  In the setting of worsening of kidney function glipizide and metformin should be discontinued  As noted above, patient had pseudohypoglycemia due to malfunctioning glucose monitoring device  Continue fingersticks every 4 hours  Hypoglycemia protocol, ISS  Previous A1c in June 2023 noted 13  Repeat A1c pending  Patient to be initiated on insulin therapy    Mixed hyperlipidemia- (present on admission)  Assessment & Plan  Resume statin therapy    Essential hypertension- (present on admission)  Assessment & Plan  Will restart blood pressure medications as tolerated  Patient to purchase himself a blood pressure cuff, keep a daily log and present to his PCP for further management of his blood pressure         VTE prophylaxis: Lovenox    I have performed a physical exam and reviewed and updated ROS and Plan today (12/14/2023). In review of yesterday's note (12/13/2023), there are no changes except as documented above.    Greater than 51 minutes spent prepping to see patient (e.g. reviewing EMR) obtaining and/or reviewing separately obtained history. Performing a medically appropriate examination and evaluation. Independently interpreting results and communicating results to patient/family/caregiver. Counseling and educating the patient/family/caregiver. Ordering medications, tests, and/or procedures. Referring and communicating with other health care professionals.  Documenting clinical information in EPIC. Care coordination.

## 2023-12-14 NOTE — ASSESSMENT & PLAN NOTE
likely secondary to hypovolemia perfusion due to hypotension.  Renal ultrasound unremarkable. Holding all antihypertensive medications at this time.  Avoid nephrotoxins

## 2023-12-14 NOTE — CARE PLAN
The patient is Stable - Low risk of patient condition declining or worsening    Shift Goals  Clinical Goals: Stress test, monitor BP and BS  Patient Goals: sleep, rest    Progress made toward(s) clinical / shift goals:    Problem: Knowledge Deficit - Standard  Goal: Patient and family/care givers will demonstrate understanding of plan of care, disease process/condition, diagnostic tests and medications  Outcome: Progressing  Note: Patient updated regarding plan of care and current treatment. Patient aware regarding upcoming test and to be remain NPO. All questions answered. Pt voiced understanding.       Problem: Diabetes Management  Goal: Patient will achieve and maintain glucose in satisfactory range  Outcome: Progressing  Note: Patient on q4 blood glucose checks. Educated to notify RN if patient experience any signs of hypoglycemia/hyperglycemia. Pt verbalized understanding.     Problem: Hemodynamics  Goal: Patient's hemodynamics, fluid balance and neurologic status will be stable or improve  Outcome: Progressing  Note: Patient alert and oriented x 4, on RA. VS remain stable. No complaints of pain of discomfort. Denies numbness and tingling to all extremities. Pt voiding adequately.        Patient is not progressing towards the following goals:

## 2023-12-14 NOTE — PROGRESS NOTES
Telemetry Report:    Rhythm:     SR - ST  Heart Rate: 84 - 105  Ectopy:      N/A      MI:  0.21     QRS:       0.06  QT:   0.36       Per Telestrip from Monitor Room

## 2023-12-15 ENCOUNTER — APPOINTMENT (OUTPATIENT)
Dept: MEDICAL GROUP | Age: 53
End: 2023-12-15
Payer: COMMERCIAL

## 2023-12-15 VITALS
WEIGHT: 257.94 LBS | SYSTOLIC BLOOD PRESSURE: 130 MMHG | DIASTOLIC BLOOD PRESSURE: 92 MMHG | OXYGEN SATURATION: 93 % | BODY MASS INDEX: 36.93 KG/M2 | HEIGHT: 70 IN | TEMPERATURE: 97.6 F | HEART RATE: 86 BPM | RESPIRATION RATE: 14 BRPM

## 2023-12-15 LAB
ANION GAP SERPL CALC-SCNC: 12 MMOL/L (ref 7–16)
BUN SERPL-MCNC: 26 MG/DL (ref 8–22)
CALCIUM SERPL-MCNC: 9.1 MG/DL (ref 8.5–10.5)
CHLORIDE SERPL-SCNC: 101 MMOL/L (ref 96–112)
CO2 SERPL-SCNC: 23 MMOL/L (ref 20–33)
CREAT SERPL-MCNC: 1.26 MG/DL (ref 0.5–1.4)
ERYTHROCYTE [DISTWIDTH] IN BLOOD BY AUTOMATED COUNT: 38.5 FL (ref 35.9–50)
GFR SERPLBLD CREATININE-BSD FMLA CKD-EPI: 68 ML/MIN/1.73 M 2
GLUCOSE BLD STRIP.AUTO-MCNC: 156 MG/DL (ref 65–99)
GLUCOSE BLD STRIP.AUTO-MCNC: 182 MG/DL (ref 65–99)
GLUCOSE SERPL-MCNC: 182 MG/DL (ref 65–99)
HCT VFR BLD AUTO: 39.5 % (ref 42–52)
HGB BLD-MCNC: 13.6 G/DL (ref 14–18)
MAGNESIUM SERPL-MCNC: 1.7 MG/DL (ref 1.5–2.5)
MCH RBC QN AUTO: 30.4 PG (ref 27–33)
MCHC RBC AUTO-ENTMCNC: 34.4 G/DL (ref 32.3–36.5)
MCV RBC AUTO: 88.2 FL (ref 81.4–97.8)
NT-PROBNP SERPL IA-MCNC: 152 PG/ML (ref 0–125)
PHOSPHATE SERPL-MCNC: 4.1 MG/DL (ref 2.5–4.5)
PLATELET # BLD AUTO: 216 K/UL (ref 164–446)
PMV BLD AUTO: 10 FL (ref 9–12.9)
POTASSIUM SERPL-SCNC: 3.6 MMOL/L (ref 3.6–5.5)
RBC # BLD AUTO: 4.48 M/UL (ref 4.7–6.1)
SODIUM SERPL-SCNC: 136 MMOL/L (ref 135–145)
WBC # BLD AUTO: 5.5 K/UL (ref 4.8–10.8)

## 2023-12-15 PROCEDURE — 83880 ASSAY OF NATRIURETIC PEPTIDE: CPT

## 2023-12-15 PROCEDURE — A9270 NON-COVERED ITEM OR SERVICE: HCPCS | Performed by: INTERNAL MEDICINE

## 2023-12-15 PROCEDURE — G0378 HOSPITAL OBSERVATION PER HR: HCPCS

## 2023-12-15 PROCEDURE — 99239 HOSP IP/OBS DSCHRG MGMT >30: CPT | Performed by: GENERAL PRACTICE

## 2023-12-15 PROCEDURE — 84100 ASSAY OF PHOSPHORUS: CPT

## 2023-12-15 PROCEDURE — 700102 HCHG RX REV CODE 250 W/ 637 OVERRIDE(OP): Performed by: INTERNAL MEDICINE

## 2023-12-15 PROCEDURE — 36415 COLL VENOUS BLD VENIPUNCTURE: CPT

## 2023-12-15 PROCEDURE — A9270 NON-COVERED ITEM OR SERVICE: HCPCS

## 2023-12-15 PROCEDURE — 84166 PROTEIN E-PHORESIS/URINE/CSF: CPT

## 2023-12-15 PROCEDURE — 700102 HCHG RX REV CODE 250 W/ 637 OVERRIDE(OP)

## 2023-12-15 PROCEDURE — 99222 1ST HOSP IP/OBS MODERATE 55: CPT | Mod: FS | Performed by: INTERNAL MEDICINE

## 2023-12-15 PROCEDURE — 85027 COMPLETE CBC AUTOMATED: CPT

## 2023-12-15 PROCEDURE — 80048 BASIC METABOLIC PNL TOTAL CA: CPT

## 2023-12-15 PROCEDURE — 84156 ASSAY OF PROTEIN URINE: CPT

## 2023-12-15 PROCEDURE — 83735 ASSAY OF MAGNESIUM: CPT

## 2023-12-15 PROCEDURE — 86335 IMMUNFIX E-PHORSIS/URINE/CSF: CPT

## 2023-12-15 PROCEDURE — 82962 GLUCOSE BLOOD TEST: CPT

## 2023-12-15 RX ORDER — OMEPRAZOLE 20 MG/1
20 CAPSULE, DELAYED RELEASE ORAL DAILY
Qty: 30 CAPSULE | Refills: 0 | Status: SHIPPED | OUTPATIENT
Start: 2023-12-16 | End: 2023-12-15

## 2023-12-15 RX ORDER — LANCETS 30 GAUGE
EACH MISCELLANEOUS
Qty: 100 EACH | Refills: 0 | Status: SHIPPED
Start: 2023-12-15 | End: 2023-12-28

## 2023-12-15 RX ORDER — CALCIUM CARBONATE 500 MG/1
500 TABLET, CHEWABLE ORAL 3 TIMES DAILY PRN
Status: DISCONTINUED | OUTPATIENT
Start: 2023-12-15 | End: 2023-12-15 | Stop reason: HOSPADM

## 2023-12-15 RX ORDER — OMEPRAZOLE 20 MG/1
20 CAPSULE, DELAYED RELEASE ORAL DAILY
Status: DISCONTINUED | OUTPATIENT
Start: 2023-12-15 | End: 2023-12-15 | Stop reason: HOSPADM

## 2023-12-15 RX ORDER — GLUCOSAMINE HCL/CHONDROITIN SU 500-400 MG
CAPSULE ORAL
Qty: 100 EACH | Refills: 0 | Status: SHIPPED | OUTPATIENT
Start: 2023-12-15 | End: 2023-12-15

## 2023-12-15 RX ORDER — LANCETS 30 GAUGE
EACH MISCELLANEOUS
Qty: 100 EACH | Refills: 0 | Status: SHIPPED | OUTPATIENT
Start: 2023-12-15 | End: 2023-12-15

## 2023-12-15 RX ORDER — OMEPRAZOLE 20 MG/1
20 CAPSULE, DELAYED RELEASE ORAL DAILY
Qty: 30 CAPSULE | Refills: 0 | Status: SHIPPED | OUTPATIENT
Start: 2023-12-16 | End: 2023-12-27 | Stop reason: SDUPTHER

## 2023-12-15 RX ORDER — GLUCOSAMINE HCL/CHONDROITIN SU 500-400 MG
CAPSULE ORAL
Qty: 100 EACH | Refills: 0 | Status: SHIPPED
Start: 2023-12-15 | End: 2023-12-28

## 2023-12-15 RX ADMIN — INSULIN HUMAN 3 UNITS: 100 INJECTION, SOLUTION PARENTERAL at 09:44

## 2023-12-15 RX ADMIN — OMEPRAZOLE 20 MG: 20 CAPSULE, DELAYED RELEASE ORAL at 05:28

## 2023-12-15 RX ADMIN — ATORVASTATIN CALCIUM 80 MG: 80 TABLET, FILM COATED ORAL at 05:28

## 2023-12-15 RX ADMIN — GABAPENTIN 200 MG: 100 CAPSULE ORAL at 05:28

## 2023-12-15 ASSESSMENT — ENCOUNTER SYMPTOMS
PSYCHIATRIC NEGATIVE: 1
MUSCULOSKELETAL NEGATIVE: 1
COUGH: 0
LIGHT-HEADEDNESS: 0
DIZZINESS: 0
HEADACHES: 0
WHEEZING: 0
DIAPHORESIS: 0
SHORTNESS OF BREATH: 0
WEAKNESS: 0
ABDOMINAL PAIN: 0
ABDOMINAL DISTENTION: 0
FEVER: 0
CONSTIPATION: 0
FATIGUE: 0
CHILLS: 0
CHEST TIGHTNESS: 0
PALPITATIONS: 0
DIARRHEA: 0

## 2023-12-15 ASSESSMENT — PAIN DESCRIPTION - PAIN TYPE
TYPE: ACUTE PAIN
TYPE: ACUTE PAIN

## 2023-12-15 ASSESSMENT — FIBROSIS 4 INDEX: FIB4 SCORE: 0.68

## 2023-12-15 NOTE — DISCHARGE SUMMARY
Discharge Summary    CHIEF COMPLAINT ON ADMISSION  Chief Complaint   Patient presents with    Low Blood Sugar     Patient's meter is ready 62. BGL in triage is 132.     Hypotension     Patient repots that at home today his BP has been low and had a home reading 71/50 with a feeling of fatigue. Reports near syncopal episode.        Reason for Admission  Blood Pressure Problem     Admission Date  12/13/2023    CODE STATUS  Full Code    HPI & HOSPITAL COURSE  This is a 53-year-old male with past medical history of hypertension, dyslipidemia, type 2 diabetes A1c 8.2 and obesity with a BMI of 38 who presented to the ED on 12/13/2023 with generalized weakness concern for hypotension following a near syncopal episode.    Patient's primary care discontinued his amlodipine a few weeks ago, he continues to take metoprolol, valsartan, and hydrochlorothiazide. Blood pressures noted to be in the systolic 80s.      Patient reports over the past several weeks, has been having intermittent chest pain.  EKG with no evidence of acute ischemic changes. Nuclear stress test negative for ischemia. TTE noted moderate LVH, calcified aortic valve leaflets, tricuspid aortic valve, mild aortic valve stenosis, transvalvular gradients are peak of 19 mmHg, mean of 12 mmHg.  Concern for possible infiltrative cardiomyopathy, rule out amyloidosis.  Case discussed with oncology, SPEP, UPEP, monoclonal protein ordered. . Case discussed with cardiology, recommend outpatient imaging. Cardiac monitor ordered and placed prior to discharge.    Labs significant for KELLE, likely secondary to hypovolemia perfusion due to hypotension.  Renal ultrasound unremarkable. Holding all antihypertensive medications at this time.  On day of discharge, renal function returned to baseline.     Therefore, he is discharged in good and stable condition to home with close outpatient follow-up.    The patient met 2-midnight criteria for an inpatient stay at the time of  discharge.    Discharge Date  12/15/2023    FOLLOW UP ITEMS POST DISCHARGE  PCP  Cardiology    DISCHARGE DIAGNOSES  Principal Problem:    Hypotension (POA: Yes)  Active Problems:    Essential hypertension (POA: Yes)    Mixed hyperlipidemia (POA: Yes)    Diabetes mellitus without complication (HCC) (POA: Yes)    Obesity (BMI 30-39.9) (POA: Yes)    Neuropathy (POA: Yes)    Benign prostatic hyperplasia without lower urinary tract symptoms (POA: Yes)    Chest pain and elevated troponin (POA: Unknown)    KELLE (acute kidney injury) (HCC) (POA: Unknown)  Resolved Problems:    * No resolved hospital problems. *      FOLLOW UP  Future Appointments   Date Time Provider Department Center   12/15/2023  1:40 PM Pablo Cordova M.D. [address] [place] Cordova M.D.  [address] [place] [id number]  [phone]    Follow up      Rishabh Rondon M.D.  [address]  [facility] [address]  [place] [address]  [phone]    Schedule an appointment as soon as possible for a visit in 2 week(s)        MEDICATIONS ON DISCHARGE     Medication List        START taking these medications        Instructions   Alcohol Swabs   Doctor's comments: Per formulary preference. ICD-10 code: E11.65 Uncontrolled type 2 Diabetes Mellitus  Wipe site with prep pad prior to injection.     * Blood Glucose Meter Kit   Doctor's comments: Or per formulary preference. ICD-10 code: E11.65 Uncontrolled type 2 Diabetes Mellitus  Test blood sugar as recommended by provider. Accucheck Guide blood glucose monitoring kit.     * Blood Glucose Test Strips   Doctor's comments: Or per formulary preference. ICD-10 code: E11.65 Uncontrolled type 2 Diabetes Mellitus  Use one Accucheck Guide strip to test blood sugar three times daily before meals.     Lancets   Doctor's comments: Or per formulary preference. ICD-10 code: E11.65 Uncontrolled type 2 Diabetes Mellitus  Use one Accucheck Guide lancet to test blood sugar three times daily before meals.     omeprazole 20 MG  delayed-release capsule  Start taking on: December 16, 2023  Commonly known as: PriLOSEC   Take 1 Capsule by mouth every day for 30 days.  Dose: 20 mg           * This list has 2 medication(s) that are the same as other medications prescribed for you. Read the directions carefully, and ask your doctor or other care provider to review them with you.                CHANGE how you take these medications        Instructions   glipiZIDE 5 MG Tabs  What changed: when to take this  Commonly known as: Glucotrol   TAKE 1 TABLET DAILY            CONTINUE taking these medications        Instructions   aspirin 81 MG EC tablet   Take 81 mg by mouth every day.  Dose: 81 mg     atorvastatin 80 MG tablet  Commonly known as: Lipitor   TAKE 1 TABLET EVERY NIGHT     gabapentin 300 MG Caps  Commonly known as: Neurontin   Take 1,200 mg by mouth every morning. 1,200 mg = 4 Caps  Dose: 1,200 mg     Ozempic (1 MG/DOSE) 4 MG/3ML Sopn  Generic drug: Semaglutide (1 MG/DOSE)   Inject 1 mg subcutaneously q. 7 days     tamsulosin 0.4 MG capsule  Commonly known as: Flomax   TAKE 1 CAPSULE 1/2 HOUR AFTER BREAKFAST  Dose: 0.4 mg     Vitamin D3 50 MCG (2000 UT) Tabs   Take 1 Tablet by mouth every day.  Dose: 1 Tablet            STOP taking these medications      Jardiance 25 MG Tabs  Generic drug: Empagliflozin     metformin 1000 MG tablet  Commonly known as: Glucophage     metoprolol  MG Tb24  Commonly known as: Toprol XL     valsartan-hydrochlorothiazide 320-25 MG per tablet  Commonly known as: Diovan-HCT              Allergies  No Known Allergies    DIET  Orders Placed This Encounter   Procedures    Diet Order Diet: Cardiac; Second Modifier: (optional): Consistent CHO (Diabetic); Miscellaneous modifications: (optional): No Decaf, No Caffeine(for test)     Standing Status:   Standing     Number of Occurrences:   1     Order Specific Question:   Diet:     Answer:   Cardiac [6]     Order Specific Question:   Second Modifier: (optional)      Answer:   Consistent CHO (Diabetic) [4]     Order Specific Question:   Miscellaneous modifications: (optional)     Answer:   No Decaf, No Caffeine(for test) [11]       ACTIVITY  As tolerated.  Weight bearing as tolerated    CONSULTATIONS  Cardiology    PROCEDURES  None    LABORATORY  Lab Results   Component Value Date    SODIUM 136 12/15/2023    POTASSIUM 3.6 12/15/2023    CHLORIDE 101 12/15/2023    CO2 23 12/15/2023    GLUCOSE 182 (H) 12/15/2023    BUN 26 (H) 12/15/2023    CREATININE 1.26 12/15/2023    CREATININE 1.0 01/18/2008        Lab Results   Component Value Date    WBC 5.5 12/15/2023    HEMOGLOBIN 13.6 (L) 12/15/2023    HEMATOCRIT 39.5 (L) 12/15/2023    PLATELETCT 216 12/15/2023      EC-ECHOCARDIOGRAM COMPLETE W/O CONT   Final Result      NM-CARDIAC STRESS TEST   Final Result      US-RENAL   Final Result         1.  Normal renal ultrasound.      DX-CHEST-PORTABLE (1 VIEW)   Final Result      No acute cardiac or pulmonary abnormalities are identified.         Total time of the discharge process exceeds 45 minutes.

## 2023-12-15 NOTE — CARE PLAN
The patient is Stable - Low risk of patient condition declining or worsening    Shift Goals  Clinical Goals: Monitor urine output, Cardiology consult in the am  Patient Goals: Sleep  Family Goals: VINCENT    Progress made toward(s) clinical / shift goals:  Patient is AAOx4, is is able to communicate his needs. Education provided on safety and using the call light when in need of assistance. Patient demonstrates understanding of education provided. Bed low, locked and call light in reach.    Problem: Hemodynamics  Goal: Patient's hemodynamics, fluid balance and neurologic status will be stable or improve  Outcome: Progressing  Note: Patient's vital signs stable and within normal ranges. Patient remains AAOx4 and he is able to communicate his needs appropriately     Problem: Psychosocial  Goal: Patient's level of anxiety will decrease  Outcome: Progressing  Note: Emotional support provided to patient, patient seems anxious to speak with cardiology. Will continue to provided support     Problem: Urinary Elimination  Goal: Establish and maintain regular urinary output  Outcome: Progressing  Note: Patient is having regular urinary output, patient is completing 24 hour urine collection. Patient voiding appropriately and understands the purpose of this lab       Patient is not progressing towards the following goals: N/A

## 2023-12-15 NOTE — DISCHARGE INSTRUCTIONS
CONTINUE METFORMIN, GLIPIZIDE AND JARDIANCE    WE ARE DISCONTINUING METOPROLOL, VALSARTAN/HCTZ - PCP will follow BP and adjust medsDischarge Instructions    Discharged to home by car with relative. Discharged via walking, hospital escort: Refused.  Special equipment needed: Not Applicable    Be sure to schedule a follow-up appointment with your primary care doctor or any specialists as instructed.     Discharge Plan:   Diet Plan: Discussed  Activity Level: Discussed  Confirmed Follow up Appointment: Patient to Call and Schedule Appointment  Confirmed Symptoms Management: Discussed  Medication Reconciliation Updated: Yes  Influenza Vaccine Indication: Not indicated: Previously immunized this influenza season and > 8 years of age    I understand that a diet low in cholesterol, fat, and sodium is recommended for good health. Unless I have been given specific instructions below for another diet, I accept this instruction as my diet prescription.   Other diet:     Special Instructions: None    -Is this patient being discharged with medication to prevent blood clots?  No    Is patient discharged on Warfarin / Coumadin?   No

## 2023-12-15 NOTE — PROGRESS NOTES
"Cardiology Initial Consultation    Date of Service  12/15/2023    Referring Physician  Natalia Galeas D.O.    Reason for Consultation  Near syncope    History of Presenting Illness  Andrez Sanchez is a 53 y.o. male with a past medical history of hypertension, diabetes, obesity, dyslipidemia, SAMSON who presented 12/13/2023 with symptoms of lightheadedness concern for hypotension.  Patient reports his blood pressure machine readings were in the 60s.  Lightheadedness was described mainly associated with change in position.  No loss of consciousness.  Patient takes several antihypertensives including metoprolol, valsartan and hydrochlorothiazide.  His amlodipine was discontinued a few weeks ago by his primary care physician due to complaints of lightheadedness.  On arrival to the emergency department blood pressure is more or less stable.   Additionally, there was a concern with patient's meter reading blood sugar in the 50's.  Blood glucose here on arrival showed 132.  Labs were significant for acute kidney injury. Patient denies current chest pain.  He does describe recent episodes of \"chest strain\" over the mid portion that are intermittent.  Denies palpitations, lightheadedness, dizziness.  Echocardiogram demonstrated LVH, EF of 60%, calcified aortic valve leaflets, and moderate aortic stenosis.  Stress test and EKG are unremarkable.  Cardiology was consulted for near syncope, hypotension, and concern for infiltrative cardiomyopathy.     Review of Systems  Review of Systems   Constitutional:  Negative for chills, diaphoresis, fatigue and fever.   HENT:  Negative for congestion.    Respiratory:  Negative for cough, chest tightness, shortness of breath and wheezing.    Cardiovascular:  Negative for chest pain, palpitations and leg swelling.   Gastrointestinal:  Negative for abdominal distention, abdominal pain, constipation and diarrhea.   Genitourinary:  Negative for hematuria.   Musculoskeletal: Negative.  "   Skin:  Negative for pallor.   Neurological:  Negative for dizziness, syncope, weakness, light-headedness and headaches.   Psychiatric/Behavioral: Negative.         Past Medical home History   has a past medical history of Diabetes (HCC), Hyperlipidemia, Hypertension, and Obesity.    Surgical History   has a past surgical history that includes av fistula revision (1973) and uvulopharyngopalatoplasty (2006).    Family History  family history includes Cancer in his mother; Diabetes in his paternal grandmother.  Patient denies cardiac family history.     Social History   reports that he has never smoked. He has never used smokeless tobacco. He reports that he does not currently use alcohol. He reports that he does not use drugs.    Medications  Prior to Admission Medications   Prescriptions Last Dose Informant Patient Reported? Taking?   Cholecalciferol (VITAMIN D3) 50 MCG (2000 UT) Tab 12/13/2023 at AM Patient Yes Yes   Sig: Take 1 Tablet by mouth every day.   JARDIANCE 25 MG Tab 12/13/2023 at AM Patient No No   Sig: TAKE 1 TABLET DAILY   Patient taking differently: Take 25 mg by mouth every day.   Semaglutide, 1 MG/DOSE, (OZEMPIC, 1 MG/DOSE,) 4 MG/3ML Solution Pen-injector 12/9/2023 at EVERY SATURDAY Patient No No   Sig: Inject 1 mg subcutaneously q. 7 days   aspirin 81 MG EC tablet 12/13/2023 at AM Patient Yes Yes   Sig: Take 81 mg by mouth every day.   atorvastatin (LIPITOR) 80 MG tablet 12/13/2023 at AM Patient No No   Sig: TAKE 1 TABLET EVERY NIGHT   gabapentin (NEURONTIN) 300 MG Cap 12/13/2023 at AM Patient Yes Yes   Sig: Take 1,200 mg by mouth every morning. 1,200 mg = 4 Caps   glipiZIDE (GLUCOTROL) 5 MG Tab 12/13/2023 at AM Patient No No   Sig: TAKE 1 TABLET DAILY   Patient taking differently: Take 5 mg by mouth every day.   metformin (GLUCOPHAGE) 1000 MG tablet 12/13/2023 at AM Patient Yes No   Sig: Take 2,000 mg by mouth every day. 2,000 mg = 2 tabs   metoprolol SR (TOPROL XL) 100 MG TABLET SR 24 HR  12/13/2023 at AM Patient No No   Sig: TAKE 1 TABLET DAILY   Patient taking differently: Take 100 mg by mouth every day.   tamsulosin (FLOMAX) 0.4 MG capsule 12/13/2023 at AM Patient No No   Sig: TAKE 1 CAPSULE 1/2 HOUR AFTER BREAKFAST   valsartan-hydrochlorothiazide (DIOVAN-HCT) 320-25 MG per tablet 12/13/2023 at AM Patient No No   Sig: TAKE 1 TABLET EVERY 24 HOURS      Facility-Administered Medications: None       Allergies  No Known Allergies    Vital signs in last 24 hours  Temp:  [35.9 °C (96.6 °F)-36.4 °C (97.5 °F)] 35.9 °C (96.6 °F)  Pulse:  [76-84] 84  Resp:  [16] 16  BP: (106-130)/(71-88) 118/88  SpO2:  [90 %-93 %] 92 %    Physical Exam  Physical Exam  Constitutional:       General: He is not in acute distress.     Appearance: He is well-developed. He is obese.   HENT:      Head: Normocephalic.   Neck:      Vascular: No carotid bruit or JVD.   Cardiovascular:      Rate and Rhythm: Normal rate and regular rhythm.      Pulses: Normal pulses.      Heart sounds: Normal heart sounds.   Pulmonary:      Effort: Pulmonary effort is normal.      Breath sounds: Normal breath sounds.   Abdominal:      General: There is no distension.      Palpations: Abdomen is soft.   Musculoskeletal:         General: No swelling. Normal range of motion.      Right lower leg: No edema.      Left lower leg: No edema.   Skin:     General: Skin is warm and dry.      Capillary Refill: Capillary refill takes less than 2 seconds.      Coloration: Skin is not pale.   Neurological:      Mental Status: He is oriented to person, place, and time.   Psychiatric:         Mood and Affect: Mood normal.         Behavior: Behavior normal.         Thought Content: Thought content normal.         Judgment: Judgment normal.         Lab Review  Lab Results   Component Value Date/Time    WBC 5.5 12/15/2023 02:46 AM    RBC 4.48 (L) 12/15/2023 02:46 AM    HEMOGLOBIN 13.6 (L) 12/15/2023 02:46 AM    HEMATOCRIT 39.5 (L) 12/15/2023 02:46 AM    MCV 88.2  12/15/2023 02:46 AM    MCH 30.4 12/15/2023 02:46 AM    MCHC 34.4 12/15/2023 02:46 AM    MPV 10.0 12/15/2023 02:46 AM      Lab Results   Component Value Date/Time    SODIUM 136 12/15/2023 02:46 AM    POTASSIUM 3.6 12/15/2023 02:46 AM    CHLORIDE 101 12/15/2023 02:46 AM    CO2 23 12/15/2023 02:46 AM    GLUCOSE 182 (H) 12/15/2023 02:46 AM    BUN 26 (H) 12/15/2023 02:46 AM    CREATININE 1.26 12/15/2023 02:46 AM    CREATININE 1.0 01/18/2008 09:22 AM    BUNCREATRAT 19 03/16/2023 05:07 AM      Lab Results   Component Value Date/Time    ASTSGOT 12 12/14/2023 02:03 AM    ALTSGPT 19 12/14/2023 02:03 AM     Lab Results   Component Value Date/Time    CHOLSTRLTOT 102 12/14/2023 02:03 AM    LDL 29 12/14/2023 02:03 AM    HDL 25 (A) 12/14/2023 02:03 AM    TRIGLYCERIDE 242 (H) 12/14/2023 02:03 AM    TROPONINT 23 (H) 12/14/2023 04:22 AM       Recent Labs     12/15/23  0246   NTPROBNP 152*       Cardiac Imaging and Procedures Review    Imaging  Chest X-Ray:  12/13/2023  IMPRESSION:  No acute cardiac or pulmonary abnormalities are identified.     Assessment/Plan  No new Assessment & Plan notes have been filed under this hospital service since the last note was generated.  Service: Cardiology    Hypotension   Patient admitted with near syncope with associated low blood pressure. His symptoms are described as lightheadedness upon changing positions. Patient takes several antihypertensives including metoprolol, valsartan and hydrochlorothiazide.  His amlodipine was discontinued a few weeks ago by his primary care physician due to complaints of lightheadedness.  On arrival to the emergency department blood pressure is more or less stable.  All antihypertensives held at this time.  She is normotensive today.  Patient is ambulating in the hallways without lightheadedness.    -Etiology is likely medication induced orthostatic hypotension.  Improved with fluids in the emergency department.    -His kidney injury is resolved.  Creatinine level  back to baseline. Suspect prerenal due to hypotension.    -Continue holding antihypertensives at this time   -Educated on and recommended self-monitoring blood pressure at home.  Recommended measuring in the morning and afternoon along with a heart rate.  Patient should bring the blood pressure log to his follow-up appointment with cardiology.  Suspect blood pressure will eventually be elevated while holding all the antihypertensives, recommend calling the cardiology office if elevated.    -Recommended appropriate fluid intake avoid dehydration   -Echocardiogram suggestive for infiltrative cardiomyopathy.  Workup is initiated for cardiac amyloidosis.  Labs are sent out.    -Will discharge with a 14-day cardiac event monitor due to near syncope event to rule out arrhythmia causes of near syncope. Monitor placed.         Future Appointments   Date Time Provider Department Center   1/24/2024  2:40 PM Pablo Cordova M.D. [address] [place]   1/30/2024  1:40 PM Vineet Barber M.D. [facility] None       Thank you for allowing me to participate in the care of this patient.    Cardiology will sign off on this patient    Please see Dr. Rondon attestation for further details and MDM.     IRonda, A.P.R.N. performed a portion of the service face-to-face with the same  patient on the same date of service INDEPENDENTLY OF Dr. Rondon. I was personally involved in reviewing and conducting elements of the history, exam and/or medical decision making, including the information as described above.    Please note this dictation was created using voice recognition software.  I have made every reasonable attempt to correct obvious errors, but there may be errors of grammar and possibly content that I did not discover before finalizing the note.    Ronda Lewis, MSN, APRN  [facility]  [phone]

## 2023-12-15 NOTE — CARE PLAN
The patient is Stable - Low risk of patient condition declining or worsening    Shift Goals  Clinical Goals: Complete 24hr urine collection, Cardiology consult, and discharge home  Patient Goals: Complete urine study, and speak with Cardiologist  Family Goals: VINCENT    Progress made toward(s) clinical / shift goals:        Problem: Knowledge Deficit - Standard  Goal: Patient and family/care givers will demonstrate understanding of plan of care, disease process/condition, diagnostic tests and medications  Outcome: Met     Problem: Diabetes Management  Goal: Patient will achieve and maintain glucose in satisfactory range  Outcome: Met     Problem: Hemodynamics  Goal: Patient's hemodynamics, fluid balance and neurologic status will be stable or improve  Outcome: Met     Pt to complete 24 hour urine collection, and then will be discharged home. Pt educated on medical condition and diagnosis, questions answered that arose, and pt verbalized understanding of all information provided. Pt to  all medications and glucose monitoring supplies at [employer] this evening after discharge. Vitals obtained throughout the day were within normal limits, and blood glucose levels monitored and coverage given per sliding scale. Cardiology consult completed today, with pt due to follow up as out patient. Pt medically cleared for discharge.    Pt discharged at approx 1735. IV site removed w/o complication, cath tip intact. No bleeding from site noted. All personal items gathered by pt. Discharge paperwork discussed with pt, all questions answered, and paperwork signed. Pt ambulated independently outside to son's car w/o complication.

## 2023-12-15 NOTE — CARE PLAN
The patient is Stable - Low risk of patient condition declining or worsening    Shift Goals  Clinical Goals: Get stress test and echo completed, maintain glucose levels  Patient Goals: get testing complete  Family Goals: None    Progress made toward(s) clinical / shift goals:        Problem: Knowledge Deficit - Standard  Goal: Patient and family/care givers will demonstrate understanding of plan of care, disease process/condition, diagnostic tests and medications  Outcome: Progressing    Education provided on stress test and echo that were performed today, with pt verbalizing understanding.      Problem: Diabetes Management  Goal: Patient will achieve and maintain glucose in satisfactory range  Outcome: Progressing    Blood glucose monitoring performed as ordered today, and education provided on HgA1C. Pt education provided on monitor glucose levels, with verbal understanding obtained. Pt is very proactive with his care, and questions regarding management of his diabetes.      Problem: Hemodynamics  Goal: Patient's hemodynamics, fluid balance and neurologic status will be stable or improve  Outcome: Progressing    Pt's blood pressure has been within normal limits throughout the day, as well all other vital signs. No c/o pain made by pt. No nausea noted. Pt tolerated all testing performed well, no complications noted. Pt is alert and oriented X 4.

## 2023-12-15 NOTE — DIETARY
Nutrition Services: Diabetic Diet Education Consult   Day 0 of admit.  Andrez Sanchez is a 53 y.o. male with admitting DX of Hypotension [I95.9]    RD able to visit pt at bedside to provide diabetic diet education. RD discussed CHO containing foods, CHO counting, My Plate, label reading, and inclusion of protein w/ CHO at meals and snacks. RD provided handout reinforcing topics discussed. Pt demonstrated appropriate readiness and expressed evidence of learning. RD able to answer all questions to patient's satisfaction.     No other education needs identified at this time. Consider referral to outpatient nutrition services for continuation of education as indicated or per pt preferences.     Please re-consult RD as indicated.

## 2023-12-15 NOTE — PROGRESS NOTES
Monitor summary: SR 72-99, NE -0.20, QRS -0.05, QT -0.35, per strip from the monitor room.

## 2023-12-16 LAB
KAPPA LC FREE SER-MCNC: 31.49 MG/L (ref 3.3–19.4)
KAPPA LC FREE/LAMBDA FREE SER NEPH: 1.23 {RATIO} (ref 0.26–1.65)
LAMBDA LC FREE SERPL-MCNC: 25.51 MG/L (ref 5.71–26.3)

## 2023-12-18 LAB
ALBUMIN SERPL ELPH-MCNC: 3.74 G/DL (ref 3.75–5.01)
ALPHA1 GLOB SERPL ELPH-MCNC: 0.25 G/DL (ref 0.19–0.46)
ALPHA2 GLOB SERPL ELPH-MCNC: 0.64 G/DL (ref 0.48–1.05)
B-GLOBULIN SERPL ELPH-MCNC: 0.73 G/DL (ref 0.48–1.1)
GAMMA GLOB SERPL ELPH-MCNC: 0.84 G/DL (ref 0.62–1.51)
INTERPRETATION SERPL IFE-IMP: ABNORMAL
INTERPRETATION SERPL IFE-IMP: ABNORMAL
MONOCLONAL PROTEIN NL11656: ABNORMAL G/DL
PATHOLOGY STUDY: ABNORMAL
PROT SERPL-MCNC: 6.2 G/DL (ref 6.3–8.2)

## 2023-12-18 NOTE — TELEPHONE ENCOUNTER
Received request via: Pharmacy    Was the patient seen in the last year in this department? Yes    Does the patient have an active prescription (recently filled or refills available) for medication(s) requested? yes    Does the patient have [facility] and need 100 day supply (blood pressure, diabetes and cholesterol meds only)? Patient does not have SCP    Requested Prescriptions     Pending Prescriptions Disp Refills    Semaglutide, 1 MG/DOSE, (OZEMPIC, 1 MG/DOSE,) 4 MG/3ML Solution Pen-injector 3 mL 6     Sig: Inject 1 mg subcutaneously q. 7 days

## 2023-12-19 LAB
ALBUMIN 24H MFR UR ELPH: 76.8 %
ALPHA1 GLOB 24H MFR UR ELPH: 3.7 %
ALPHA2 GLOB 24H MFR UR ELPH: 9.8 %
B-GLOBULIN 24H MFR UR ELPH: 8.8 %
COLLECT DURATION TIME SPEC: 24 HRS
EER MONOCLONAL PROTEIN STUDY, 24 HOUR U Q5964: ABNORMAL
GAMMA GLOB 24H MFR UR ELPH: 0.9 %
INTERPRETATION UR IFE-IMP: ABNORMAL
M PROTEIN 24H MFR UR ELPH: 0 %
M PROTEIN 24H UR ELPH-MRATE: 0 MG/24 HRS
PROT 24H UR-MRATE: 217 MG/D (ref 40–150)
PROT UR-MCNC: 7 MG/DL
SPECIMEN VOL ?TM UR: ABNORMAL ML

## 2023-12-20 DIAGNOSIS — R73.9 HYPERGLYCEMIA: ICD-10-CM

## 2023-12-20 DIAGNOSIS — E11.65 TYPE 2 DIABETES MELLITUS WITH HYPERGLYCEMIA, WITH LONG-TERM CURRENT USE OF INSULIN (HCC): ICD-10-CM

## 2023-12-20 DIAGNOSIS — E11.65 UNCONTROLLED TYPE 2 DIABETES MELLITUS WITH HYPERGLYCEMIA (HCC): ICD-10-CM

## 2023-12-20 DIAGNOSIS — E11.9 DIABETES MELLITUS WITHOUT COMPLICATION (HCC): ICD-10-CM

## 2023-12-20 DIAGNOSIS — Z79.4 TYPE 2 DIABETES MELLITUS WITH HYPERGLYCEMIA, WITH LONG-TERM CURRENT USE OF INSULIN (HCC): ICD-10-CM

## 2023-12-20 RX ORDER — SEMAGLUTIDE 1.34 MG/ML
INJECTION, SOLUTION SUBCUTANEOUS
Qty: 3 ML | Refills: 6 | Status: SHIPPED | OUTPATIENT
Start: 2023-12-20 | End: 2024-03-05 | Stop reason: SDUPTHER

## 2023-12-20 RX ORDER — FLASH GLUCOSE SENSOR
1 KIT MISCELLANEOUS
Qty: 3 EACH | Refills: 9 | Status: SHIPPED | OUTPATIENT
Start: 2023-12-20 | End: 2023-12-21 | Stop reason: SDUPTHER

## 2023-12-20 RX ORDER — SEMAGLUTIDE 1.34 MG/ML
INJECTION, SOLUTION SUBCUTANEOUS
Qty: 3 ML | Refills: 6 | Status: SHIPPED | OUTPATIENT
Start: 2023-12-20 | End: 2023-12-28

## 2023-12-20 RX ORDER — GLIPIZIDE 5 MG/1
5 TABLET ORAL DAILY
Qty: 90 TABLET | Refills: 3 | Status: SHIPPED | OUTPATIENT
Start: 2023-12-20 | End: 2023-12-21 | Stop reason: SDUPTHER

## 2023-12-20 RX ORDER — EMPAGLIFLOZIN 10 MG/1
10 TABLET, FILM COATED ORAL DAILY
Qty: 30 TABLET | Refills: 3 | Status: SHIPPED | OUTPATIENT
Start: 2023-12-20 | End: 2023-12-21 | Stop reason: SDUPTHER

## 2023-12-21 DIAGNOSIS — Z79.4 TYPE 2 DIABETES MELLITUS WITH HYPERGLYCEMIA, WITH LONG-TERM CURRENT USE OF INSULIN (HCC): ICD-10-CM

## 2023-12-21 DIAGNOSIS — E11.65 UNCONTROLLED TYPE 2 DIABETES MELLITUS WITH HYPERGLYCEMIA (HCC): ICD-10-CM

## 2023-12-21 DIAGNOSIS — E11.65 TYPE 2 DIABETES MELLITUS WITH HYPERGLYCEMIA, WITH LONG-TERM CURRENT USE OF INSULIN (HCC): ICD-10-CM

## 2023-12-21 DIAGNOSIS — E11.9 DIABETES MELLITUS WITHOUT COMPLICATION (HCC): ICD-10-CM

## 2023-12-21 DIAGNOSIS — R73.9 HYPERGLYCEMIA: ICD-10-CM

## 2023-12-21 RX ORDER — EMPAGLIFLOZIN 10 MG/1
10 TABLET, FILM COATED ORAL DAILY
Qty: 30 TABLET | Refills: 3 | Status: SHIPPED | OUTPATIENT
Start: 2023-12-21 | End: 2024-01-30

## 2023-12-21 RX ORDER — GLIPIZIDE 5 MG/1
5 TABLET ORAL DAILY
Qty: 90 TABLET | Refills: 3 | Status: SHIPPED | OUTPATIENT
Start: 2023-12-21 | End: 2023-12-28 | Stop reason: SINTOL

## 2023-12-21 RX ORDER — FLASH GLUCOSE SENSOR
1 KIT MISCELLANEOUS
Qty: 3 EACH | Refills: 9 | Status: SHIPPED | OUTPATIENT
Start: 2023-12-21 | End: 2024-01-24 | Stop reason: SDUPTHER

## 2023-12-27 DIAGNOSIS — R30.0 DYSURIA: ICD-10-CM

## 2023-12-27 DIAGNOSIS — K21.9 GASTROESOPHAGEAL REFLUX DISEASE WITHOUT ESOPHAGITIS: ICD-10-CM

## 2023-12-27 RX ORDER — TAMSULOSIN HYDROCHLORIDE 0.4 MG/1
0.4 CAPSULE ORAL
Qty: 90 CAPSULE | Refills: 3 | Status: SHIPPED | OUTPATIENT
Start: 2023-12-27

## 2023-12-27 RX ORDER — OMEPRAZOLE 20 MG/1
20 CAPSULE, DELAYED RELEASE ORAL DAILY
Qty: 30 CAPSULE | Refills: 0 | Status: SHIPPED | OUTPATIENT
Start: 2023-12-27 | End: 2024-01-26

## 2023-12-27 RX ORDER — GABAPENTIN 300 MG/1
1200 CAPSULE ORAL EVERY MORNING
Qty: 90 CAPSULE | Refills: 2 | Status: SHIPPED | OUTPATIENT
Start: 2023-12-27

## 2023-12-28 ENCOUNTER — OFFICE VISIT (OUTPATIENT)
Dept: MEDICAL GROUP | Age: 53
End: 2023-12-28
Payer: COMMERCIAL

## 2023-12-28 VITALS
BODY MASS INDEX: 38.37 KG/M2 | OXYGEN SATURATION: 96 % | HEART RATE: 85 BPM | WEIGHT: 268 LBS | DIASTOLIC BLOOD PRESSURE: 80 MMHG | SYSTOLIC BLOOD PRESSURE: 130 MMHG | HEIGHT: 70 IN | TEMPERATURE: 98.1 F

## 2023-12-28 DIAGNOSIS — E11.9 DIABETES MELLITUS WITHOUT COMPLICATION (HCC): ICD-10-CM

## 2023-12-28 DIAGNOSIS — I10 ESSENTIAL HYPERTENSION: ICD-10-CM

## 2023-12-28 DIAGNOSIS — E16.2 HYPOGLYCEMIA: ICD-10-CM

## 2023-12-28 PROCEDURE — 99214 OFFICE O/P EST MOD 30 MIN: CPT | Performed by: STUDENT IN AN ORGANIZED HEALTH CARE EDUCATION/TRAINING PROGRAM

## 2023-12-28 PROCEDURE — 3075F SYST BP GE 130 - 139MM HG: CPT | Performed by: STUDENT IN AN ORGANIZED HEALTH CARE EDUCATION/TRAINING PROGRAM

## 2023-12-28 PROCEDURE — 3079F DIAST BP 80-89 MM HG: CPT | Performed by: STUDENT IN AN ORGANIZED HEALTH CARE EDUCATION/TRAINING PROGRAM

## 2023-12-28 ASSESSMENT — ENCOUNTER SYMPTOMS
PHOTOPHOBIA: 0
SHORTNESS OF BREATH: 0
COUGH: 0
HEARTBURN: 0
WEAKNESS: 0
HEMOPTYSIS: 0
ORTHOPNEA: 0
ABDOMINAL PAIN: 0
BLOOD IN STOOL: 0
BLURRED VISION: 0
CHILLS: 0
HEADACHES: 0
DIZZINESS: 0
MYALGIAS: 0
PALPITATIONS: 0
NECK PAIN: 0
FEVER: 0
DOUBLE VISION: 0

## 2023-12-28 ASSESSMENT — FIBROSIS 4 INDEX: FIB4 SCORE: 0.66

## 2023-12-28 NOTE — PROGRESS NOTES
"Subjective:     CC: Low blood glucose     HPI:   Andrez presents today to discuss low BG readings.  He states that over the past 3 weeks has had multiple low BG reading in the low/mid 50's causing him feeling fatigued. He is in multiple drugs for DM one of them Glipizide known to cause low BG.  This week he had 3 log BG readings between 52-59.  Records show that he was recently seen in the ER on 12/13 for low blood pressures at home 71/50. He had extensive work up and is currently wearing a Zio patch and is scheduled to see Cardiologist on 1/30/24.  All his BP meds were discontinues and currently normotensive.      ROS:  Review of Systems   Constitutional:  Negative for chills, fever and malaise/fatigue.   HENT:  Negative for ear discharge and tinnitus.    Eyes:  Negative for blurred vision, double vision and photophobia.   Respiratory:  Negative for cough, hemoptysis and shortness of breath.    Cardiovascular:  Negative for chest pain, palpitations, orthopnea and leg swelling.   Gastrointestinal:  Negative for abdominal pain, blood in stool, heartburn and melena.   Genitourinary:  Negative for dysuria, frequency and urgency.   Musculoskeletal:  Negative for myalgias and neck pain.   Neurological:  Negative for dizziness, weakness and headaches.       Objective:     Exam:  /80 (BP Location: Left arm, Patient Position: Sitting, BP Cuff Size: Adult long)   Pulse 85   Temp 36.7 °C (98.1 °F) (Temporal)   Ht 1.778 m (5' 10\")   Wt 122 kg (268 lb)   SpO2 96%   BMI 38.45 kg/m²  Body mass index is 38.45 kg/m².    Physical Exam  Vitals reviewed.   Constitutional:       General: He is not in acute distress.  HENT:      Head: Normocephalic and atraumatic.      Nose: No congestion.      Mouth/Throat:      Mouth: Mucous membranes are moist.      Pharynx: No oropharyngeal exudate or posterior oropharyngeal erythema.   Eyes:      General: No scleral icterus.     Pupils: Pupils are equal, round, and reactive to light. "   Cardiovascular:      Rate and Rhythm: Normal rate and regular rhythm.      Pulses: Normal pulses.      Heart sounds: Murmur heard.   Pulmonary:      Effort: Pulmonary effort is normal. No respiratory distress.      Breath sounds: Normal breath sounds. No wheezing.   Abdominal:      General: Bowel sounds are normal. There is no distension.      Palpations: Abdomen is soft.      Tenderness: There is no abdominal tenderness. There is no guarding or rebound.   Musculoskeletal:      Cervical back: Normal range of motion and neck supple.      Right lower leg: No edema.      Left lower leg: No edema.   Skin:     General: Skin is warm.      Capillary Refill: Capillary refill takes less than 2 seconds.      Findings: No bruising or erythema.   Neurological:      General: No focal deficit present.      Mental Status: He is alert.      Cranial Nerves: No cranial nerve deficit.      Sensory: No sensory deficit.   Psychiatric:         Mood and Affect: Mood normal.         Behavior: Behavior normal.         A chaperone was offered to the patient during today's exam.: Patient declined.    Labs: None    Assessment & Plan:     53 y.o. male with the following -     1. Hypoglycemia  - Hypoglycemic episodes in the past 3 weeks   - This weeks 3 episodes with BG low/mid 50's  - On 4 drugs for DM  - I suggested stopping glipizide since is known to cause Hypoglycemia  - Most recent A1c: 8.2 on 12/14/23 before that on June was 13.0  - He wears a BG  monitor   - His average BG is 115  - CTM BG  - Discontinue Glipizide  - Continue other 3 drugs  - Follow up appointment scheduled for 1/24/24    2. Diabetes mellitus without complication (HCC)  - Chronic, improved from poorly controlled June 13.0 A1c and now 8.2%  - We will discontinue Glipizide given multiple episodes of Hypoglycemia in the past 3 weeks     3. Essential hypertension  - Currently off medications  - Seen for Hypotension on 12/13/23  - had transient KELLE but resolved   - CTM  BP  - Today 130/80           Return if symptoms worsen or fail to improve.    Please note that this dictation was created using voice recognition software. I have made every reasonable attempt to correct obvious errors, but I expect that there are errors of grammar and possibly content that I did not discover before finalizing the note.

## 2024-01-08 DIAGNOSIS — I95.2 HYPOTENSION DUE TO DRUGS: ICD-10-CM

## 2024-01-08 DIAGNOSIS — R55 NEAR SYNCOPE: ICD-10-CM

## 2024-01-09 ENCOUNTER — TELEPHONE (OUTPATIENT)
Dept: CARDIOLOGY | Facility: MEDICAL CENTER | Age: 54
End: 2024-01-09
Payer: COMMERCIAL

## 2024-01-17 ENCOUNTER — TELEPHONE (OUTPATIENT)
Dept: CARDIOLOGY | Facility: MEDICAL CENTER | Age: 54
End: 2024-01-17
Payer: COMMERCIAL

## 2024-01-17 NOTE — TELEPHONE ENCOUNTER
Did patient answer the phone? NO     Was a voice mail left? YES    Has patient had labs, imaging, or cardiac testing outside [facility]?     Where has patient had labs, imaging, or cardiac testing done?     Did [facility] fax for records request? NO     Fax number used to request records

## 2024-01-24 ENCOUNTER — OFFICE VISIT (OUTPATIENT)
Dept: MEDICAL GROUP | Age: 54
End: 2024-01-24
Payer: COMMERCIAL

## 2024-01-24 VITALS
HEART RATE: 102 BPM | DIASTOLIC BLOOD PRESSURE: 90 MMHG | BODY MASS INDEX: 38.37 KG/M2 | HEIGHT: 70 IN | WEIGHT: 268 LBS | SYSTOLIC BLOOD PRESSURE: 164 MMHG | TEMPERATURE: 98 F | OXYGEN SATURATION: 93 %

## 2024-01-24 DIAGNOSIS — E11.65 UNCONTROLLED TYPE 2 DIABETES MELLITUS WITH HYPERGLYCEMIA (HCC): ICD-10-CM

## 2024-01-24 DIAGNOSIS — Z09 HOSPITAL DISCHARGE FOLLOW-UP: ICD-10-CM

## 2024-01-24 DIAGNOSIS — I35.0 MILD AORTIC VALVE STENOSIS: ICD-10-CM

## 2024-01-24 DIAGNOSIS — I10 ESSENTIAL HYPERTENSION: ICD-10-CM

## 2024-01-24 PROCEDURE — 99214 OFFICE O/P EST MOD 30 MIN: CPT | Performed by: FAMILY MEDICINE

## 2024-01-24 PROCEDURE — 3077F SYST BP >= 140 MM HG: CPT | Performed by: FAMILY MEDICINE

## 2024-01-24 PROCEDURE — 3080F DIAST BP >= 90 MM HG: CPT | Performed by: FAMILY MEDICINE

## 2024-01-24 RX ORDER — EMPAGLIFLOZIN 25 MG/1
TABLET, FILM COATED ORAL
Qty: 90 TABLET | Refills: 2 | Status: SHIPPED | OUTPATIENT
Start: 2024-01-24

## 2024-01-24 RX ORDER — FLASH GLUCOSE SENSOR
1 KIT MISCELLANEOUS
Qty: 3 EACH | Refills: 9 | Status: SHIPPED
Start: 2024-01-24

## 2024-01-24 RX ORDER — FLASH GLUCOSE SENSOR
1 KIT MISCELLANEOUS
Qty: 3 EACH | Refills: 9 | Status: SHIPPED | OUTPATIENT
Start: 2024-01-24 | End: 2024-01-24 | Stop reason: SDUPTHER

## 2024-01-24 ASSESSMENT — PATIENT HEALTH QUESTIONNAIRE - PHQ9: CLINICAL INTERPRETATION OF PHQ2 SCORE: 0

## 2024-01-24 ASSESSMENT — FIBROSIS 4 INDEX: FIB4 SCORE: 0.66

## 2024-01-24 NOTE — PROGRESS NOTES
This medical record contains text that has been entered with the assistance of computer voice recognition and dictation software.  Therefore, it may contain unintended errors in text, spelling, punctuation, or grammar      No chief complaint on file.        Andrez Sanchez is a 53 y.o. male here evaluation and management of: Hospital follow-up      HPI:           1. Hospital discharge follow-up  Matthew is a very pleasant 53-year-old male who was seen in the emergency room on December 13, 2023 for low blood glucose level he states his meter read at 62.  In the ER the blood glucose was 132 the patient also stated that his home blood pressure was 71/50 and he felt tired.  The fatigue was associated with a near syncopal episode, his amlodipine was stopped few weeks prior to this he was taking metoprolol valsartan hydrochlorothiazide and he still reported blood pressure systolically in the 80s.  EKG in the ER revealed no acute changes, nuclear stress test was negative for ischemia a TTE did reveal mild aortic valve stenosis and concern for possible infiltrative cardiomyopathy and rule out amyloidosis.   Latest Reference Range & Units 12/15/23 17:40   Total Protein, Urine mg/dL 7   Total Protein, 24 Hour Urine 40 - 150 mg/d 217 (H)   Collection Length Hrs 24   Total Volume mL 3100mL   Albumin %, Urine % 76.8   Alpha-1 %, Urine % 3.7   Alpha-2 %, Urine % 9.8   Beta Globulin %, Urine % 8.8   Gamma Globulin %, Urine % 0.9   Paraprotein %, Urine % 0.0   Paraprotein Excretion/24 Hour mg/24 hrs 0.0   EER Monoclonal Protein Study, 24 Hour U  See Note   (H): Data is abnormally high   Latest Reference Range & Units 12/14/23 21:11   Interpretation  See Note   Albumin 3.75 - 5.01 g/dL 3.74 (L)   Gamma Globulin 0.62 - 1.51 g/dL 0.84   Alpha-1 Globulin 0.19 - 0.46 g/dL 0.25   Alpha-2 Globulin 0.48 - 1.05 g/dL 0.64   Beta Globulin 0.48 - 1.10 g/dL 0.73   Free Kappa Light Chains 3.30 - 19.40 mg/L 31.49 (H)   Free Lambda Light Chains  5.71 - 26.30 mg/L 25.51   Kappa-Lambda Ratio 0.26 - 1.65  1.23   Immunofixation  LINDSEY Done   Monoclonal Protein g/dL Not Applicable   EER Monoclonal Protein Study, Ser  See Note   (L): Data is abnormally low  (H): Data is abnormally high    Current medicines (including changes today)  Current Outpatient Medications   Medication Sig Dispense Refill    Continuous Blood Gluc Sensor (FREESTYLE DINA 14 DAY SENSOR) Misc 1 Each every 14 days. 3 Each 9    Empagliflozin (JARDIANCE) 25 MG Tab Take 1 tab po q24h 90 Tablet 2    gabapentin (NEURONTIN) 300 MG Cap Take 4 Capsules by mouth every morning. 1,200 mg = 4 Caps 90 Capsule 2    omeprazole (PRILOSEC) 20 MG delayed-release capsule Take 1 Capsule by mouth every day for 30 days. 30 Capsule 0    tamsulosin (FLOMAX) 0.4 MG capsule Take 1 Capsule by mouth 1/2 hour after breakfast. 90 Capsule 3    metformin (GLUCOPHAGE) 1000 MG tablet Take 1 Tablet by mouth 2 times a day with meals. 180 Tablet 0    Empagliflozin (JARDIANCE) 10 MG Tab tablet Take 1 Tablet by mouth every day. 30 Tablet 3    Semaglutide, 1 MG/DOSE, (OZEMPIC, 1 MG/DOSE,) 4 MG/3ML Solution Pen-injector Inject 1 mg subcutaneously q. 7 days 3 mL 6    aspirin 81 MG EC tablet Take 81 mg by mouth every day.      Cholecalciferol (VITAMIN D3) 50 MCG (2000 UT) Tab Take 1 Tablet by mouth every day.      atorvastatin (LIPITOR) 80 MG tablet TAKE 1 TABLET EVERY NIGHT 90 Tablet 3     No current facility-administered medications for this visit.     He  has a past medical history of Diabetes (HCC), Hyperlipidemia, Hypertension, and Obesity.  He  has a past surgical history that includes av fistula revision (1973) and uvulopharyngopalatoplasty (2006).  Social History     Tobacco Use    Smoking status: Never    Smokeless tobacco: Never   Vaping Use    Vaping Use: Never used   Substance Use Topics    Alcohol use: Not Currently     Comment: rare    Drug use: No     Social History     Social History Narrative    Not on file     Family  "History   Problem Relation Age of Onset    Cancer Mother     Diabetes Paternal Grandmother      Family Status   Relation Name Status    Mo   at age 68        CHF    Fa  Alive    Bro  Alive    Bro  Alive    Son  Alive    Son  Alive    PGMo  (Not Specified)         ROS    The pertinent  ROS findings can be seen in the HPI above.     All other systems reviewed and are negative     Objective:     BP (!) 164/90 (BP Location: Left arm, Patient Position: Sitting, BP Cuff Size: Adult long)   Pulse (!) 102   Temp 36.7 °C (98 °F) (Temporal)   Ht 1.778 m (5' 10\")   Wt 122 kg (268 lb)   SpO2 93%  Body mass index is 38.45 kg/m².      Physical Exam:    Constitutional: Alert, no distress.  Skin: No suspicious lesions  Eye: Equal, round and reactive, conjunctiva clear, lids normal.  ENMT: Lips without lesions, good dentition, oropharynx clear.  Neck: Trachea midline, no masses, no thyromegaly. No cervical or supraclavicular lymphadenopathy.  Respiratory: Unlabored respiratory effort, lungs clear to auscultation, no wheezes, no ronchi.  Cardiovascular: Normal S1, S2, no murmur, no edema  Abdomen: Soft, non-tender, no masses, no hepatosplenomegaly.        Assessment and Plan:   The following treatment plan was discussed    All recent labs and provider notes reviewed    1. Hospital discharge follow-up  2. Mild aortic valve stenosis  3. Essential hypertension    Blood pressure is elevated again we will restart only valsartan and he will return to clinic in 3 to 4 weeks with a blood pressure log.  He will also follow-up with cardiology on .  New diagnosis is mild aortic valve stenosis he will need yearly echocardiograms.      Blood pressure goal is less than 130/80            In addition to pharmacotherapy we discussed  lifestyle modification…#1. Maintain normal weight (BMI 18.5 to 24.kg/m2), #2 DASH diet, #3 Decrease Sodium intake to less than 100meq/day (2.4g Na or 6g NaCL), #4 increase physical activity, #5 " Limit Etoh consumption to 2 drinks/day in men and 1 drink per day in women.          4. Uncontrolled type 2 diabetes mellitus with hyperglycemia (HCC)  - Continuous Blood Gluc Sensor (FREESTYLE DINA 14 DAY SENSOR) Misc; 1 Each every 14 days.  Dispense: 3 Each; Refill: 9  - Empagliflozin (JARDIANCE) 25 MG Tab; Take 1 tab po q24h  Dispense: 90 Tablet; Refill: 2             Instructed to Follow up in clinic or ER for worsening symptoms, difficulty breathing, lack of expected recovery, or should new symptoms or problems arise.    Followup: Return in about 4 weeks (around 2/21/2024) for 3-4 week BP log.

## 2024-01-30 ENCOUNTER — OFFICE VISIT (OUTPATIENT)
Dept: CARDIOLOGY | Facility: MEDICAL CENTER | Age: 54
End: 2024-01-30
Attending: INTERNAL MEDICINE
Payer: COMMERCIAL

## 2024-01-30 VITALS
SYSTOLIC BLOOD PRESSURE: 124 MMHG | BODY MASS INDEX: 36.94 KG/M2 | HEART RATE: 83 BPM | RESPIRATION RATE: 16 BRPM | WEIGHT: 258 LBS | HEIGHT: 70 IN | DIASTOLIC BLOOD PRESSURE: 84 MMHG | OXYGEN SATURATION: 92 %

## 2024-01-30 DIAGNOSIS — E78.2 MIXED HYPERLIPIDEMIA: ICD-10-CM

## 2024-01-30 DIAGNOSIS — I10 ESSENTIAL HYPERTENSION: ICD-10-CM

## 2024-01-30 DIAGNOSIS — I35.0 MILD AORTIC VALVE STENOSIS: ICD-10-CM

## 2024-01-30 DIAGNOSIS — R93.1 ABNORMAL ECHOCARDIOGRAM: ICD-10-CM

## 2024-01-30 PROCEDURE — 3079F DIAST BP 80-89 MM HG: CPT | Performed by: INTERNAL MEDICINE

## 2024-01-30 PROCEDURE — 99204 OFFICE O/P NEW MOD 45 MIN: CPT | Performed by: INTERNAL MEDICINE

## 2024-01-30 PROCEDURE — 3074F SYST BP LT 130 MM HG: CPT | Performed by: INTERNAL MEDICINE

## 2024-01-30 PROCEDURE — 99213 OFFICE O/P EST LOW 20 MIN: CPT | Performed by: INTERNAL MEDICINE

## 2024-01-30 RX ORDER — OMEPRAZOLE 20 MG/1
20 CAPSULE, DELAYED RELEASE ORAL DAILY
COMMUNITY

## 2024-01-30 RX ORDER — CHLORAL HYDRATE 500 MG
1000 CAPSULE ORAL
COMMUNITY

## 2024-01-30 ASSESSMENT — ENCOUNTER SYMPTOMS
RESPIRATORY NEGATIVE: 1
CHILLS: 0
BRUISES/BLEEDS EASILY: 0
NAUSEA: 0
CLAUDICATION: 0
GASTROINTESTINAL NEGATIVE: 1
VOMITING: 0
SHORTNESS OF BREATH: 0
ABDOMINAL PAIN: 0
FEVER: 0
PALPITATIONS: 0
EYES NEGATIVE: 1
MYALGIAS: 0
PSYCHIATRIC NEGATIVE: 1
NERVOUS/ANXIOUS: 0
HEADACHES: 1
CONSTITUTIONAL NEGATIVE: 1
DIZZINESS: 0
WEIGHT LOSS: 0
DEPRESSION: 0
MUSCULOSKELETAL NEGATIVE: 1
BLURRED VISION: 0
FOCAL WEAKNESS: 0
DOUBLE VISION: 0
WEAKNESS: 0
COUGH: 0

## 2024-01-30 ASSESSMENT — FIBROSIS 4 INDEX: FIB4 SCORE: 0.66

## 2024-01-30 NOTE — PROGRESS NOTES
Chief Complaint   Patient presents with    Hypertension     NP Dx: Essential hypertension       Subjective     Andrez Zenon Sanchez is a 53 y.o. male who presents today as a consult from Natalia Abebe for cardiac amyloidosis.    Thank you for allowing me to evaluate Mr. Sanchez, who as you know is a 53 year old male with diabetes mellitus, hypertension and hyperlipidemia, lifelong nonsmoker, no family history of coronary artery disease. He was recently admitted to [facility] [place]note 2[place] for near syncopal episode. He underwent an echocardiogram which was abnormal as described. He admits to mild left chest pressure. He denies associated shortness of breath, palpitations, nausea/vomiting or diaphoresis.      Past Medical History:   Diagnosis Date    Diabetes (HCC)     Hyperlipidemia     Hypertension     Obesity      Past Surgical History:   Procedure Laterality Date    UVULOPHARYNGOPALATOPLASTY  2006    uvulectomy for sleep apnea    AV FISTULA REVISION  1973    Repair Congenital Chest AV Fistula     Family History   Problem Relation Age of Onset    Cancer Mother     Diabetes Paternal Grandmother     Heart Disease Neg Hx     Heart Attack Neg Hx      Social History     Socioeconomic History    Marital status:      Spouse name: Not on file    Number of children: Not on file    Years of education: Not on file    Highest education level: Not on file   Occupational History    Not on file   Tobacco Use    Smoking status: Never    Smokeless tobacco: Never   Vaping Use    Vaping Use: Never used   Substance and Sexual Activity    Alcohol use: Not Currently     Comment: rare    Drug use: No    Sexual activity: Yes     Partners: Female     Birth control/protection: Condom     Comment: wife had double mastectomy for breast cancer   Other Topics Concern    Not on file   Social History Narrative    Not on file     Social Determinants of Health     Financial Resource Strain: Not on file   Food Insecurity: Not on file    Transportation Needs: Not on file   Physical Activity: Not on file   Stress: Not on file   Social Connections: Not on file   Intimate Partner Violence: Not on file   Housing Stability: Not on file     No Known Allergies    (Medications reviewed.)  Outpatient Encounter Medications as of 1/30/2024   Medication Sig Dispense Refill    omeprazole (PRILOSEC) 20 MG delayed-release capsule Take 20 mg by mouth every day.      Omega-3 Fatty Acids (FISH OIL) 1000 MG Cap capsule Take 1,000 mg by mouth 3 times a day with meals.      Continuous Blood Gluc Sensor (FREESTYLE DINA 14 DAY SENSOR) Misc 1 Each every 14 days. 3 Each 9    Empagliflozin (JARDIANCE) 25 MG Tab Take 1 tab po q24h 90 Tablet 2    gabapentin (NEURONTIN) 300 MG Cap Take 4 Capsules by mouth every morning. 1,200 mg = 4 Caps 90 Capsule 2    tamsulosin (FLOMAX) 0.4 MG capsule Take 1 Capsule by mouth 1/2 hour after breakfast. 90 Capsule 3    metformin (GLUCOPHAGE) 1000 MG tablet Take 1 Tablet by mouth 2 times a day with meals. 180 Tablet 0    Semaglutide, 1 MG/DOSE, (OZEMPIC, 1 MG/DOSE,) 4 MG/3ML Solution Pen-injector Inject 1 mg subcutaneously q. 7 days 3 mL 6    aspirin 81 MG EC tablet Take 81 mg by mouth every day.      Cholecalciferol (VITAMIN D3) 50 MCG (2000 UT) Tab Take 1 Tablet by mouth every day.      atorvastatin (LIPITOR) 80 MG tablet TAKE 1 TABLET EVERY NIGHT 90 Tablet 3    [DISCONTINUED] Empagliflozin (JARDIANCE) 10 MG Tab tablet Take 1 Tablet by mouth every day. (Patient not taking: Reported on 1/30/2024) 30 Tablet 3     No facility-administered encounter medications on file as of 1/30/2024.     Review of Systems   Constitutional: Negative.  Negative for chills, fever, malaise/fatigue and weight loss.   HENT:  Positive for tinnitus. Negative for hearing loss.    Eyes: Negative.  Negative for blurred vision and double vision.   Respiratory: Negative.  Negative for cough and shortness of breath.    Cardiovascular:  Positive for chest pain. Negative for  "palpitations, claudication and leg swelling.   Gastrointestinal: Negative.  Negative for abdominal pain, nausea and vomiting.   Genitourinary: Negative.  Negative for dysuria and urgency.   Musculoskeletal: Negative.  Negative for joint pain and myalgias.   Skin: Negative.  Negative for itching and rash.   Neurological:  Positive for headaches. Negative for dizziness, focal weakness and weakness.   Endo/Heme/Allergies: Negative.  Does not bruise/bleed easily.   Psychiatric/Behavioral: Negative.  Negative for depression. The patient is not nervous/anxious.               Objective     /84 (BP Location: Left arm, Patient Position: Sitting, BP Cuff Size: Adult)   Pulse 83   Resp 16   Ht 1.778 m (5' 10\")   Wt 117 kg (258 lb)   SpO2 92%   BMI 37.02 kg/m²     Physical Exam  Constitutional:       Appearance: Normal appearance. He is well-developed and normal weight.   HENT:      Head: Normocephalic and atraumatic.   Neck:      Vascular: No JVD.   Cardiovascular:      Rate and Rhythm: Normal rate and regular rhythm.      Heart sounds: Normal heart sounds.   Pulmonary:      Effort: Pulmonary effort is normal.      Breath sounds: Normal breath sounds.   Abdominal:      General: Bowel sounds are normal.      Palpations: Abdomen is soft.      Comments: No hepatosplenomegaly.   Musculoskeletal:         General: Normal range of motion.   Lymphadenopathy:      Cervical: No cervical adenopathy.   Skin:     General: Skin is warm and dry.   Neurological:      Mental Status: He is alert and oriented to person, place, and time.            CARDIAC STUDIES/PROCEDURES:    ECHOCARDIOGRAM CONCLUSIONS (12/14/23)  No prior study is available for comparison.   Moderate concentric left ventricular hypertrophy.  The left ventricular   ejection fraction is visually estimated to be 60%.  Calcified aortic valve leaflets. Tricuspid aortic valve.    Mild aortic valve stenosis. Transvalvular gradients are - Peak:19 mmHg, Mean:12 " mmHg.  Estimated right ventricular systolic pressure is 30 mmHg.  Based on echocardiographic findings along with EKG findings, infiltrative cardiomyopathy is of concern.    Consider workup for cardiac amyloidosis if clinically indicated.  (study result reviewed)     EKG performed on (12/14/23) was reviewed: EKG personally interpreted shows sinus rhythm .     Laboratory results of (12/14/23) were reviewed. Cholesterol profile of 102/242/25/79 mg/dL noted.     MYOCARDIAL PERFUSION STUDY CONCLUSIONS (12/14/23)  NUCLEAR IMAGING INTERPRETATION   No evidence of significant jeopardized viable myocardium or prior myocardial infarction.  Normal left ventricular size, ejection fraction, and wall motion.  Negative stress ECG for ischemia.   ECG INTERPRETATION  Negative stress ECG for ischemia.  (study result reviewed)     ZIO REPORT  Procedure: Zio   DOS: 1/9/2024   Findings:   Analysis duration: 14 days   Underlying rhythm: Predominantly sinus rhythm   The average heart rate is 87 BPM, and ranges from  BPM   Atrial events: <1%  rare PACs   Ventricular events: <1%  rare PVCs   Pauses or high degree heart block: None   Patient events: 10 patient triggers during sinus rhythm/tachycardia/PACs, rate  bpm, no diary   Conclusion:   Rare PACs and PVCs   Patient triggered events correlated predominantly sinus rhythm/tachycardia 74-102bpm with rare PACs   Reassuring monitor findings   (study result reviewed)     Assessment & Plan     1. Abnormal echocardiogram        2. Mild aortic valve stenosis        3. Essential hypertension        4. Mixed hyperlipidemia            Medical Decision Making: Today's Assessment/Status/Plan:        Abnormal echocardiogram : The recent echocardiogram is abnormal as described above. We will perform a cardiac MRI. We will refer to [facility].   Aortic stenosis: He remains asymptomatic with mild aortic stenosis.   Hypertension: Blood pressure has been high. We will  restart valsartan and reassess the blood pressure.   Hyperlipidemia: He is doing well on statin therapy without myalgia symptoms.    We will refer him to [facility].    Thank you for this consult.

## 2024-02-09 ENCOUNTER — TELEPHONE (OUTPATIENT)
Dept: MEDICAL GROUP | Age: 54
End: 2024-02-09
Payer: COMMERCIAL

## 2024-02-09 NOTE — TELEPHONE ENCOUNTER
Andrez called today saying that he has been feeling very dizzy in the afternoons and often when he takes his pressure it is 80's/60's.  told him to discontinue his Valsartan and come back in 4 weeks with a  BP log. I scheduled him an appointment.

## 2024-02-21 RX ORDER — GLIPIZIDE 5 MG/1
5 TABLET ORAL DAILY
Qty: 90 TABLET | Refills: 3 | Status: SHIPPED | OUTPATIENT
Start: 2024-02-21

## 2024-03-06 RX ORDER — SEMAGLUTIDE 1.34 MG/ML
INJECTION, SOLUTION SUBCUTANEOUS
Qty: 3 ML | Refills: 6 | Status: SHIPPED | OUTPATIENT
Start: 2024-03-06

## 2024-03-11 ENCOUNTER — HOSPITAL ENCOUNTER (OUTPATIENT)
Dept: RADIOLOGY | Facility: MEDICAL CENTER | Age: 54
End: 2024-03-11
Attending: INTERNAL MEDICINE
Payer: COMMERCIAL

## 2024-03-11 DIAGNOSIS — R93.1 ABNORMAL ECHOCARDIOGRAM: ICD-10-CM

## 2024-03-11 DIAGNOSIS — I10 ESSENTIAL HYPERTENSION: ICD-10-CM

## 2024-03-11 DIAGNOSIS — I35.0 MILD AORTIC VALVE STENOSIS: ICD-10-CM

## 2024-03-11 PROCEDURE — 700117 HCHG RX CONTRAST REV CODE 255

## 2024-03-11 PROCEDURE — A9578 INJ MULTIHANCE MULTIPACK: HCPCS

## 2024-03-11 PROCEDURE — 75557 CARDIAC MRI FOR MORPH: CPT

## 2024-03-11 RX ADMIN — GADOBENATE DIMEGLUMINE 20 ML: 529 INJECTION, SOLUTION INTRAVENOUS at 16:31

## 2024-03-15 ENCOUNTER — TELEPHONE (OUTPATIENT)
Dept: CARDIOLOGY | Facility: MEDICAL CENTER | Age: 54
End: 2024-03-15
Payer: COMMERCIAL

## 2024-03-15 NOTE — TELEPHONE ENCOUNTER
----- Message from Vineet Barber M.D. sent at 3/14/2024  4:46 PM PDT -----  Please notify patient of cardiac MRI showing no evidence of infiltrative cardiomyopathy as amyloidosis. We will continue with current medical therapy and discuss further at follow up. Thank you.  AJ

## 2024-04-01 DIAGNOSIS — R73.9 HYPERGLYCEMIA: ICD-10-CM

## 2024-04-22 ENCOUNTER — OFFICE VISIT (OUTPATIENT)
Dept: URGENT CARE | Facility: PHYSICIAN GROUP | Age: 54
End: 2024-04-22
Payer: COMMERCIAL

## 2024-04-22 VITALS
SYSTOLIC BLOOD PRESSURE: 158 MMHG | BODY MASS INDEX: 35.9 KG/M2 | WEIGHT: 250.8 LBS | HEART RATE: 87 BPM | HEIGHT: 70 IN | OXYGEN SATURATION: 94 % | TEMPERATURE: 98.2 F | DIASTOLIC BLOOD PRESSURE: 98 MMHG | RESPIRATION RATE: 16 BRPM

## 2024-04-22 DIAGNOSIS — R05.1 ACUTE COUGH: ICD-10-CM

## 2024-04-22 DIAGNOSIS — J02.9 PHARYNGITIS, UNSPECIFIED ETIOLOGY: ICD-10-CM

## 2024-04-22 DIAGNOSIS — R03.0 ELEVATED BLOOD PRESSURE READING: ICD-10-CM

## 2024-04-22 DIAGNOSIS — J98.01 BRONCHOSPASM: ICD-10-CM

## 2024-04-22 LAB
FLUAV RNA SPEC QL NAA+PROBE: NEGATIVE
FLUBV RNA SPEC QL NAA+PROBE: NEGATIVE
RSV RNA SPEC QL NAA+PROBE: NEGATIVE
S PYO DNA SPEC NAA+PROBE: NOT DETECTED
SARS-COV-2 RNA RESP QL NAA+PROBE: NEGATIVE

## 2024-04-22 PROCEDURE — 99214 OFFICE O/P EST MOD 30 MIN: CPT | Performed by: FAMILY MEDICINE

## 2024-04-22 PROCEDURE — 0241U POCT CEPHEID COV-2, FLU A/B, RSV - PCR: CPT | Performed by: FAMILY MEDICINE

## 2024-04-22 PROCEDURE — 87651 STREP A DNA AMP PROBE: CPT | Performed by: FAMILY MEDICINE

## 2024-04-22 PROCEDURE — 3080F DIAST BP >= 90 MM HG: CPT | Performed by: FAMILY MEDICINE

## 2024-04-22 PROCEDURE — 3077F SYST BP >= 140 MM HG: CPT | Performed by: FAMILY MEDICINE

## 2024-04-22 RX ORDER — BENZONATATE 100 MG/1
100 CAPSULE ORAL 3 TIMES DAILY PRN
Qty: 30 CAPSULE | Refills: 0 | Status: SHIPPED
Start: 2024-04-22 | End: 2024-04-30

## 2024-04-22 RX ORDER — ALBUTEROL SULFATE 90 UG/1
1-2 AEROSOL, METERED RESPIRATORY (INHALATION) EVERY 4 HOURS PRN
Qty: 1 EACH | Refills: 1 | Status: SHIPPED
Start: 2024-04-22 | End: 2024-04-30

## 2024-04-22 ASSESSMENT — ENCOUNTER SYMPTOMS
CHILLS: 0
COUGH: 1
SHORTNESS OF BREATH: 0
FEVER: 0
MYALGIAS: 0
NAUSEA: 0
SORE THROAT: 1
DIZZINESS: 0
VOMITING: 0

## 2024-04-22 ASSESSMENT — FIBROSIS 4 INDEX: FIB4 SCORE: 0.66

## 2024-04-22 NOTE — LETTER
April 22, 2024         Patient: Andrez Sanchez   YOB: 1970   Date of Visit: 4/22/2024           To Whom it May Concern:    Andrez Sanchez was seen in my clinic on 4/22/2024. He may return to work on 4/24/2024.    If you have any questions or concerns, please don't hesitate to call.        Sincerely,           Norris Sofia M.D.  Electronically Signed

## 2024-04-22 NOTE — PROGRESS NOTES
Subjective:   Andrez Sanchez is a 53 y.o. male who presents for Nasal Congestion (Congestion and cough x 5 days, The patient stated his sides hurt from excessive coughing.), Pharyngitis (Sore throat on Friday, but it went away.), and Cough        Pharyngitis   This is a new (Reports sore throat over the past 5 days, initially improved, reports cough congestion over the past 5 days) problem. The current episode started in the past 7 days. The problem has been waxing and waning. Associated symptoms include congestion and coughing. Pertinent negatives include no shortness of breath (Wheezing, intermittent, congestion) or vomiting. Associated symptoms comments: There has been community-wide COVID-19, influenza, and RSV exposure, the patient denies known direct COVID-19 or influenza exposure    History of hypertension noted in patient is currently in a care management cardiology and primary care provider  . Treatments tried: Over-the-counter antitussive. The treatment provided mild relief.   Cough  Associated symptoms include a sore throat. Pertinent negatives include no chills, fever, myalgias, rash or shortness of breath (Wheezing, intermittent, congestion).     PMH:  has a past medical history of Diabetes (HCC), Hyperlipidemia, Hypertension, and Obesity.  MEDS:   Current Outpatient Medications:     albuterol 108 (90 Base) MCG/ACT Aero Soln inhalation aerosol, Inhale 1-2 Puffs every four hours as needed for Shortness of Breath., Disp: 1 Each, Rfl: 1    benzonatate (TESSALON) 100 MG Cap, Take 1 Capsule by mouth 3 times a day as needed for Cough., Disp: 30 Capsule, Rfl: 0    metformin (GLUCOPHAGE) 1000 MG tablet, TAKE 1 TABLET TWICE A DAY WITH MEALS (NEED SEEN), Disp: 180 Tablet, Rfl: 3    Semaglutide, 1 MG/DOSE, (OZEMPIC, 1 MG/DOSE,) 4 MG/3ML Solution Pen-injector, Inject 1 mg subcutaneously q. 7 days, Disp: 3 mL, Rfl: 6    glipiZIDE (GLUCOTROL) 5 MG Tab, TAKE 1 TABLET DAILY, Disp: 90 Tablet, Rfl: 3    omeprazole  "(PRILOSEC) 20 MG delayed-release capsule, Take 20 mg by mouth every day., Disp: , Rfl:     Omega-3 Fatty Acids (FISH OIL) 1000 MG Cap capsule, Take 1,000 mg by mouth 3 times a day with meals., Disp: , Rfl:     Empagliflozin (JARDIANCE) 25 MG Tab, Take 1 tab po q24h, Disp: 90 Tablet, Rfl: 2    gabapentin (NEURONTIN) 300 MG Cap, Take 4 Capsules by mouth every morning. 1,200 mg = 4 Caps, Disp: 90 Capsule, Rfl: 2    tamsulosin (FLOMAX) 0.4 MG capsule, Take 1 Capsule by mouth 1/2 hour after breakfast., Disp: 90 Capsule, Rfl: 3    aspirin 81 MG EC tablet, Take 81 mg by mouth every day., Disp: , Rfl:     Cholecalciferol (VITAMIN D3) 50 MCG (2000 UT) Tab, Take 1 Tablet by mouth every day., Disp: , Rfl:     atorvastatin (LIPITOR) 80 MG tablet, TAKE 1 TABLET EVERY NIGHT, Disp: 90 Tablet, Rfl: 3  ALLERGIES: No Known Allergies  SURGHX:   Past Surgical History:   Procedure Laterality Date    UVULOPHARYNGOPALATOPLASTY  2006    uvulectomy for sleep apnea    AV FISTULA REVISION  1973    Repair Congenital Chest AV Fistula     SOCHX:  reports that he has never smoked. He has never used smokeless tobacco. He reports current alcohol use. He reports that he does not use drugs.  FH:   Family History   Problem Relation Age of Onset    Cancer Mother     Diabetes Paternal Grandmother     Heart Disease Neg Hx     Heart Attack Neg Hx      Review of Systems   Constitutional:  Negative for chills and fever.   HENT:  Positive for congestion and sore throat.    Respiratory:  Positive for cough. Negative for shortness of breath (Wheezing, intermittent, congestion).    Gastrointestinal:  Negative for nausea and vomiting.   Musculoskeletal:  Negative for myalgias.   Skin:  Negative for rash.   Neurological:  Negative for dizziness.        Objective:   BP (!) 158/98 (BP Location: Right arm, Patient Position: Sitting, BP Cuff Size: Adult)   Pulse 87   Temp 36.8 °C (98.2 °F) (Temporal)   Resp 16   Ht 1.778 m (5' 10\")   Wt 114 kg (250 lb 12.8 oz)  "  SpO2 94%   BMI 35.99 kg/m²   Physical Exam  Vitals and nursing note reviewed.   Constitutional:       General: He is not in acute distress.     Appearance: He is well-developed.   HENT:      Head: Normocephalic and atraumatic.      Right Ear: Tympanic membrane and external ear normal.      Left Ear: Tympanic membrane and external ear normal.      Nose: Rhinorrhea present.      Mouth/Throat:      Mouth: Mucous membranes are moist.      Pharynx: Posterior oropharyngeal erythema present. No oropharyngeal exudate.   Eyes:      Conjunctiva/sclera: Conjunctivae normal.   Cardiovascular:      Rate and Rhythm: Normal rate.   Pulmonary:      Effort: Pulmonary effort is normal. No respiratory distress.      Breath sounds: Wheezing (Mild expiratory) present. No rhonchi.   Abdominal:      General: There is no distension.   Musculoskeletal:         General: Normal range of motion.   Skin:     General: Skin is warm and dry.   Neurological:      General: No focal deficit present.      Mental Status: He is alert and oriented to person, place, and time. Mental status is at baseline.      Gait: Gait (gait at baseline) normal.   Psychiatric:         Judgment: Judgment normal.           Assessment/Plan:   1. Pharyngitis, unspecified etiology  - POCT GROUP A STREP, PCR    2. Acute cough  - POCT CEPHEID COV-2, FLU A/B, RSV - PCR  - benzonatate (TESSALON) 100 MG Cap; Take 1 Capsule by mouth 3 times a day as needed for Cough.  Dispense: 30 Capsule; Refill: 0    3. Bronchospasm  - albuterol 108 (90 Base) MCG/ACT Aero Soln inhalation aerosol; Inhale 1-2 Puffs every four hours as needed for Shortness of Breath.  Dispense: 1 Each; Refill: 1    4. Elevated blood pressure reading        Medical Decision Making/Course:  In the course of preparing for this visit with review of the pertinent past medical history, recent and past clinic visits, current medications, and performing chart, immunization, medical history and medication reconciliation,  and in the further course of obtaining the current history pertinent to the clinic visit today, performing an exam and evaluation, ordering and independently evaluating labs, tests including group A strep ,Influenza A, Influenza B, RSV, and SARS CoV-2 by PCR testing   , and/or procedures, prescribing any recommended new medications as noted above, providing any pertinent counseling and education and recommending further coordination of care including recommendations for symptomatic and supportive measures and drafting work excuse letter including recommendation to follow-up with primary care provider and cardiology as scheduled for routine follow-up of elevated blood pressure with a history of hypertension, at least  23 minutes of total time were spent during this encounter.      Discussed close monitoring, return precautions, and supportive measures of maintaining adequate fluid hydration and caloric intake, relative rest and symptom management as needed for pain and/or fever.    Differential diagnosis, natural history, supportive care, and indications for immediate follow-up discussed.     Advised the patient to follow-up with the primary care physician for recheck, reevaluation, and consideration of further management.    Please note that this dictation was created using voice recognition software. I have worked with consultants from the vendor as well as technical experts from [employer] to optimize the interface. I have made every reasonable attempt to correct obvious errors, but I expect that there are errors of grammar and possibly content that I did not discover before finalizing the note.

## 2024-04-22 NOTE — PATIENT INSTRUCTIONS
Cough, Adult  A cough helps to clear your throat and lungs. A cough may be a sign of an illness or another medical condition.  An acute cough may only last 2-3 weeks, while a chronic cough may last 8 or more weeks.  Many things can cause a cough. They include:  Germs (viruses or bacteria) that attack the airway.  Breathing in things that bother (irritate) your lungs.  Allergies.  Asthma.  Mucus that runs down the back of your throat (postnasal drip).  Smoking.  Acid backing up from the stomach into the tube that moves food from the mouth to the stomach (gastroesophageal reflux).  Some medicines.  Lung problems.  Other medical conditions, such as heart failure or a blood clot in the lung (pulmonary embolism).  Follow these instructions at home:  Medicines  Take over-the-counter and prescription medicines only as told by your doctor.  Talk with your doctor before you take medicines that stop a cough (cough suppressants).  Lifestyle    Do not smoke, and try not to be around smoke. Do not use any products that contain nicotine or tobacco, such as cigarettes, e-cigarettes, and chewing tobacco. If you need help quitting, ask your doctor.  Drink enough fluid to keep your pee (urine) pale yellow.  Avoid caffeine.  Do not drink alcohol if your doctor tells you not to drink.  General instructions    Watch for any changes in your cough. Tell your doctor about them.  Always cover your mouth when you cough.  Stay away from things that make you cough, such as perfume, candles, campfire smoke, or cleaning products.  If the air is dry, use a cool mist vaporizer or humidifier in your home.  If your cough is worse at night, try using extra pillows to raise your head up higher while you sleep.  Rest as needed.  Keep all follow-up visits as told by your doctor. This is important.  Contact a doctor if:  You have new symptoms.  You cough up pus.  Your cough does not get better after 2-3 weeks, or your cough gets worse.  Cough medicine  does not help your cough and you are not sleeping well.  You have pain that gets worse or pain that is not helped with medicine.  You have a fever.  You are losing weight and you do not know why.  You have night sweats.  Get help right away if:  You cough up blood.  You have trouble breathing.  Your heartbeat is very fast.  These symptoms may be an emergency. Do not wait to see if the symptoms will go away. Get medical help right away. Call your local emergency services (911 in the [place] Do not drive yourself to the hospital.  Summary  A cough helps to clear your throat and lungs. Many things can cause a cough.  Take over-the-counter and prescription medicines only as told by your doctor.  Always cover your mouth when you cough.  Contact a doctor if you have new symptoms or you have a cough that does not get better or gets worse.  This information is not intended to replace advice given to you by your health care provider. Make sure you discuss any questions you have with your health care provider.  Document Revised: 02/05/2021 Document Reviewed: 01/06/2020  [employer] Patient Education © 2023 [employer]  Bronchospasm, Adult    Bronchospasm is when the small airways in the lungs narrow. This can make it very hard to breathe. Swelling and more mucus than normal can add to this problem.  What are the causes?  Having a cold.  Exercise.  The smell from sprays, perfumes, candles, and .  Cold air.  Stress or strong feelings such as laughing or crying.  What increases the risk?  Having asthma.  Smoking.  Being around someone who smokes (secondhand smoke).  Having allergies.  Being allergic to certain foods, medicine, or bug bites or stings.  What are the signs or symptoms?  Making a high-pitched whistling sound when you breathe, most often when you breathe out (wheezing).  Coughing.  A tight feeling in your chest.  Feeling like you cannot catch your breath.  Feeling like you have no energy to exercise.  Breathing  that is noisy.  A cough that has a high pitch.  How is this treated?    Using medicines that you breathe in (inhale). These open up the airways and help you breathe. Medicines can be taken with a metered dose inhaler or a nebulizer device.  Taking medicines to reduce swelling.  Getting rid of what started the bronchospasm.  Follow these instructions at home:  Medicines  Take over-the-counter and prescription medicines only as told by your doctor.  If you need to use an inhaler or nebulizer to take your medicine, ask your doctor how to use it.  You may be given a spacer to use with your inhaler. This makes it easier to get the medicine from the inhaler into your lungs.  Lifestyle  Do not smoke or use any products that contain nicotine or tobacco. If you need help quitting, ask your doctor.  Keep track of things that start your bronchospasm. Avoid these if you can.  When pollen, air pollution, or humidity is bad, keep windows closed. Use an air conditioner if you have one.  Find ways to cope with stress and your feelings.  Activity  Some people have bronchospasm when they exercise. This is called exercise-induced bronchoconstriction (EIB). If you have this problem, talk with your doctor about how to deal with EIB. Some tips include:  Use your inhaler before exercise.  Exercise indoors if it is very cold or humid, or if the pollen and mold counts are high.  Warm up and cool down before and after exercise.  Stop your exercise right away if your symptoms start or get worse.  General instructions  If you have asthma, make sure you have an asthma action plan.  Stay up to date on your shots (immunizations).  Keep all follow-up visits.  Get help right away if:  You have trouble breathing.  You wheeze and cough and this does not get better after you take medicine.  You have chest pain.  You have trouble speaking more than one word in a sentence.  These symptoms may be an emergency. Get help right away. Call 911.  Do not wait  to see if the symptoms will go away.  Do not drive yourself to the hospital.  Summary  Bronchospasm is when the small airways in the lungs narrow. Swelling and more mucus than normal can add to this problem. This can make it very hard to breathe.  Do not smoke or use any products that contain nicotine or tobacco. If you need help quitting, ask your doctor.  Get help right away if you wheeze and cough and this does not get better after you take medicine.  This information is not intended to replace advice given to you by your health care provider. Make sure you discuss any questions you have with your health care provider.  Document Revised: 07/11/2022 Document Reviewed: 07/11/2022  [employer] Patient Education © 2023 [employer]

## 2024-04-30 ENCOUNTER — OFFICE VISIT (OUTPATIENT)
Dept: CARDIOLOGY | Facility: MEDICAL CENTER | Age: 54
End: 2024-04-30
Attending: INTERNAL MEDICINE
Payer: COMMERCIAL

## 2024-04-30 VITALS
DIASTOLIC BLOOD PRESSURE: 106 MMHG | WEIGHT: 260 LBS | RESPIRATION RATE: 16 BRPM | SYSTOLIC BLOOD PRESSURE: 168 MMHG | HEIGHT: 70 IN | OXYGEN SATURATION: 95 % | BODY MASS INDEX: 37.22 KG/M2 | HEART RATE: 88 BPM

## 2024-04-30 DIAGNOSIS — R93.1 ABNORMAL ECHOCARDIOGRAM: ICD-10-CM

## 2024-04-30 DIAGNOSIS — D80.8 LIGHT CHAIN DISEASE (HCC): ICD-10-CM

## 2024-04-30 DIAGNOSIS — E11.65 TYPE 2 DIABETES MELLITUS WITH HYPERGLYCEMIA, WITHOUT LONG-TERM CURRENT USE OF INSULIN (HCC): ICD-10-CM

## 2024-04-30 DIAGNOSIS — I10 HTN (HYPERTENSION), MALIGNANT: ICD-10-CM

## 2024-04-30 DIAGNOSIS — I51.7 LVH (LEFT VENTRICULAR HYPERTROPHY): ICD-10-CM

## 2024-04-30 DIAGNOSIS — E78.2 MIXED HYPERLIPIDEMIA: ICD-10-CM

## 2024-04-30 DIAGNOSIS — I35.0 MILD AORTIC VALVE STENOSIS: ICD-10-CM

## 2024-04-30 PROCEDURE — 99213 OFFICE O/P EST LOW 20 MIN: CPT | Performed by: INTERNAL MEDICINE

## 2024-04-30 RX ORDER — VALSARTAN 80 MG/1
80 TABLET ORAL DAILY
Qty: 90 TABLET | Refills: 3 | Status: SHIPPED | OUTPATIENT
Start: 2024-04-30

## 2024-04-30 ASSESSMENT — ENCOUNTER SYMPTOMS
EYE DISCHARGE: 0
FALLS: 0
BLOOD IN STOOL: 0
HALLUCINATIONS: 0
DIZZINESS: 0
CLAUDICATION: 0
CHILLS: 0
COUGH: 0
NAUSEA: 0
EYE PAIN: 0
DEPRESSION: 0
VOMITING: 0
SPEECH CHANGE: 0
SHORTNESS OF BREATH: 0
HEADACHES: 0
BLURRED VISION: 0
ORTHOPNEA: 0
DOUBLE VISION: 0
SENSORY CHANGE: 0
PND: 0
LOSS OF CONSCIOUSNESS: 0
ABDOMINAL PAIN: 0
MYALGIAS: 0
BRUISES/BLEEDS EASILY: 0
FEVER: 0
WEIGHT LOSS: 0
PALPITATIONS: 0

## 2024-04-30 ASSESSMENT — FIBROSIS 4 INDEX: FIB4 SCORE: 0.66

## 2024-04-30 NOTE — PROGRESS NOTES
Chief Complaint   Patient presents with    Hyperlipidemia    Hypertension       Subjective     Andrez Sanchez is a 53 y.o. male who presents today for cardiac care and management due to concern for potential cardiac amyloidosis.  Patient had elevated urine light chains.  However, he did have an MRI which showed infiltrative processes.    I personally interpreted the event tracing which showed PACs and PVCs.  No malignant arrhythmias.    Past Medical History:   Diagnosis Date    Diabetes (HCC)     Hyperlipidemia     Hypertension     Obesity      Past Surgical History:   Procedure Laterality Date    UVULOPHARYNGOPALATOPLASTY  2006    uvulectomy for sleep apnea    AV FISTULA REVISION  1973    Repair Congenital Chest AV Fistula     Family History   Problem Relation Age of Onset    Cancer Mother     Diabetes Paternal Grandmother     Heart Disease Neg Hx     Heart Attack Neg Hx      Social History     Socioeconomic History    Marital status:      Spouse name: Not on file    Number of children: Not on file    Years of education: Not on file    Highest education level: Not on file   Occupational History    Not on file   Tobacco Use    Smoking status: Never    Smokeless tobacco: Never   Vaping Use    Vaping Use: Never used   Substance and Sexual Activity    Alcohol use: Not Currently     Comment: rare    Drug use: No    Sexual activity: Yes     Partners: Female     Birth control/protection: Condom     Comment: wife had double mastectomy for breast cancer   Other Topics Concern    Not on file   Social History Narrative    Not on file     Social Determinants of Health     Financial Resource Strain: Not on file   Food Insecurity: Not on file   Transportation Needs: Not on file   Physical Activity: Not on file   Stress: Not on file   Social Connections: Not on file   Intimate Partner Violence: Not on file   Housing Stability: Not on file     No Known Allergies  Outpatient Encounter Medications as of 4/30/2024    Medication Sig Dispense Refill    metformin (GLUCOPHAGE) 1000 MG tablet TAKE 1 TABLET TWICE A DAY WITH MEALS (NEED SEEN) 180 Tablet 3    Semaglutide, 1 MG/DOSE, (OZEMPIC, 1 MG/DOSE,) 4 MG/3ML Solution Pen-injector Inject 1 mg subcutaneously q. 7 days 3 mL 6    glipiZIDE (GLUCOTROL) 5 MG Tab TAKE 1 TABLET DAILY 90 Tablet 3    omeprazole (PRILOSEC) 20 MG delayed-release capsule Take 20 mg by mouth every day.      Omega-3 Fatty Acids (FISH OIL) 1000 MG Cap capsule Take 1,000 mg by mouth 3 times a day with meals.      Empagliflozin (JARDIANCE) 25 MG Tab Take 1 tab po q24h 90 Tablet 2    gabapentin (NEURONTIN) 300 MG Cap Take 4 Capsules by mouth every morning. 1,200 mg = 4 Caps 90 Capsule 2    tamsulosin (FLOMAX) 0.4 MG capsule Take 1 Capsule by mouth 1/2 hour after breakfast. 90 Capsule 3    aspirin 81 MG EC tablet Take 81 mg by mouth every day.      Cholecalciferol (VITAMIN D3) 50 MCG (2000 UT) Tab Take 1 Tablet by mouth every day.      atorvastatin (LIPITOR) 80 MG tablet TAKE 1 TABLET EVERY NIGHT 90 Tablet 3    [DISCONTINUED] albuterol 108 (90 Base) MCG/ACT Aero Soln inhalation aerosol Inhale 1-2 Puffs every four hours as needed for Shortness of Breath. (Patient not taking: Reported on 4/30/2024) 1 Each 1    [DISCONTINUED] benzonatate (TESSALON) 100 MG Cap Take 1 Capsule by mouth 3 times a day as needed for Cough. (Patient not taking: Reported on 4/30/2024) 30 Capsule 0     No facility-administered encounter medications on file as of 4/30/2024.     Review of Systems   Constitutional:  Negative for chills, fever, malaise/fatigue and weight loss.   HENT:  Negative for ear discharge, ear pain, hearing loss and nosebleeds.    Eyes:  Negative for blurred vision, double vision, pain and discharge.   Respiratory:  Negative for cough and shortness of breath.    Cardiovascular:  Negative for chest pain, palpitations, orthopnea, claudication, leg swelling and PND.   Gastrointestinal:  Negative for abdominal pain, blood in  "stool, melena, nausea and vomiting.   Genitourinary:  Negative for dysuria and hematuria.   Musculoskeletal:  Negative for falls, joint pain and myalgias.   Skin:  Negative for itching and rash.   Neurological:  Negative for dizziness, sensory change, speech change, loss of consciousness and headaches.   Endo/Heme/Allergies:  Negative for environmental allergies. Does not bruise/bleed easily.   Psychiatric/Behavioral:  Negative for depression, hallucinations and suicidal ideas.               Objective     BP (!) 168/106 (BP Location: Left arm, Patient Position: Sitting, BP Cuff Size: Adult)   Pulse 88   Resp 16   Ht 1.778 m (5' 10\")   Wt 118 kg (260 lb)   SpO2 95%   BMI 37.31 kg/m²     Physical Exam  Vitals and nursing note reviewed.   Constitutional:       General: He is not in acute distress.     Appearance: He is not diaphoretic.   HENT:      Head: Normocephalic and atraumatic.      Right Ear: External ear normal.      Left Ear: External ear normal.      Nose: No congestion or rhinorrhea.   Eyes:      General:         Right eye: No discharge.         Left eye: No discharge.   Neck:      Thyroid: No thyromegaly.      Vascular: No JVD.   Cardiovascular:      Rate and Rhythm: Normal rate and regular rhythm.      Pulses: Normal pulses.   Pulmonary:      Effort: No respiratory distress.   Abdominal:      General: There is no distension.      Tenderness: There is no abdominal tenderness.   Musculoskeletal:         General: No swelling or tenderness.      Right lower leg: No edema.      Left lower leg: No edema.   Skin:     General: Skin is warm and dry.   Neurological:      Mental Status: He is alert and oriented to person, place, and time.      Cranial Nerves: No cranial nerve deficit.   Psychiatric:         Behavior: Behavior normal.     Assessment & Plan     1. Mild aortic valve stenosis        2. Mixed hyperlipidemia        3. LVH (left ventricular hypertrophy)        4. Type 2 diabetes mellitus with " hyperglycemia, without long-term current use of insulin (HCC)        5. Light chain disease (HCC)  Referral to Hematology Oncology          Medical Decision Making: Today's Assessment/Status/Plan:   At this time, there is no evidence of cardiac amyloidosis based on cardiac MRI.  His LVH along with symptomatology are likely related to uncontrolled hypertension.  Therefore, the most important aspect of his coronary care at this time is to control his blood pressure.  We will restart valsartan at 80 mg p.o. once a day.    Continue a atorvastatin 80 mg p.o. once a day for LDL control.    Continue Jardiance 25 mg p.o. once a day.    We will refer patient to hematology clinic due to elevated light chains in urine.    This visit encounter signifies the visit complexity inherent to evaluation and management associated with medical care services that serve as the continuing focal point for all needed health care services and/or with medical care services that are part of ongoing care related to this patient's single, serious condition, complex cardiac condition.    Juan Miner M.D.

## 2024-05-03 RX ORDER — ATORVASTATIN CALCIUM 80 MG/1
TABLET, FILM COATED ORAL
Qty: 90 TABLET | Refills: 3 | Status: SHIPPED | OUTPATIENT
Start: 2024-05-03

## 2024-05-25 ENCOUNTER — PATIENT MESSAGE (OUTPATIENT)
Dept: MEDICAL GROUP | Age: 54
End: 2024-05-25
Payer: COMMERCIAL

## 2024-05-28 RX ORDER — OMEPRAZOLE 20 MG/1
20 CAPSULE, DELAYED RELEASE ORAL DAILY
Qty: 90 CAPSULE | Refills: 0 | Status: SHIPPED | OUTPATIENT
Start: 2024-05-28

## 2024-06-04 RX ORDER — OMEPRAZOLE 20 MG/1
20 CAPSULE, DELAYED RELEASE ORAL DAILY
Qty: 90 CAPSULE | Refills: 0 | Status: SHIPPED | OUTPATIENT
Start: 2024-06-04

## 2024-06-04 RX ORDER — SEMAGLUTIDE 1.34 MG/ML
INJECTION, SOLUTION SUBCUTANEOUS
Qty: 3 ML | Refills: 6 | Status: SHIPPED | OUTPATIENT
Start: 2024-06-04 | End: 2024-06-12 | Stop reason: SDUPTHER

## 2024-06-13 RX ORDER — SEMAGLUTIDE 1.34 MG/ML
INJECTION, SOLUTION SUBCUTANEOUS
Qty: 3 ML | Refills: 6 | Status: SHIPPED | OUTPATIENT
Start: 2024-06-13

## 2024-08-02 RX ORDER — GABAPENTIN 300 MG/1
1200 CAPSULE ORAL EVERY MORNING
Qty: 90 CAPSULE | Refills: 2 | Status: SHIPPED | OUTPATIENT
Start: 2024-08-02

## 2024-09-01 DIAGNOSIS — E11.65 UNCONTROLLED TYPE 2 DIABETES MELLITUS WITH HYPERGLYCEMIA (HCC): ICD-10-CM

## 2024-09-01 DIAGNOSIS — K21.9 GASTROESOPHAGEAL REFLUX DISEASE WITHOUT ESOPHAGITIS: ICD-10-CM

## 2024-09-03 RX ORDER — SEMAGLUTIDE 1.34 MG/ML
INJECTION, SOLUTION SUBCUTANEOUS
Qty: 3 ML | Refills: 6 | Status: SHIPPED | OUTPATIENT
Start: 2024-09-03

## 2024-10-01 RX ORDER — GABAPENTIN 300 MG/1
1200 CAPSULE ORAL EVERY MORNING
Qty: 90 CAPSULE | Refills: 2 | Status: SHIPPED | OUTPATIENT
Start: 2024-10-01 | End: 2024-10-02 | Stop reason: SDUPTHER

## 2024-10-02 ENCOUNTER — HOSPITAL ENCOUNTER (OUTPATIENT)
Facility: MEDICAL CENTER | Age: 54
End: 2024-10-02
Attending: FAMILY MEDICINE
Payer: COMMERCIAL

## 2024-10-02 ENCOUNTER — OFFICE VISIT (OUTPATIENT)
Dept: MEDICAL GROUP | Age: 54
End: 2024-10-02
Payer: COMMERCIAL

## 2024-10-02 VITALS
TEMPERATURE: 98.5 F | HEART RATE: 100 BPM | BODY MASS INDEX: 37.22 KG/M2 | HEIGHT: 70 IN | OXYGEN SATURATION: 96 % | SYSTOLIC BLOOD PRESSURE: 144 MMHG | DIASTOLIC BLOOD PRESSURE: 92 MMHG | WEIGHT: 260 LBS

## 2024-10-02 DIAGNOSIS — Z00.00 ANNUAL PHYSICAL EXAM: ICD-10-CM

## 2024-10-02 DIAGNOSIS — I10 ESSENTIAL HYPERTENSION: ICD-10-CM

## 2024-10-02 DIAGNOSIS — N40.0 BENIGN PROSTATIC HYPERPLASIA WITHOUT LOWER URINARY TRACT SYMPTOMS: ICD-10-CM

## 2024-10-02 DIAGNOSIS — E11.9 DIABETES MELLITUS WITHOUT COMPLICATION (HCC): ICD-10-CM

## 2024-10-02 LAB
CREAT UR-MCNC: 91.5 MG/DL
HBA1C MFR BLD: 7.4 % (ref ?–5.8)
MICROALBUMIN UR-MCNC: 98.7 MG/DL
MICROALBUMIN/CREAT UR: 1079 MG/G (ref 0–30)
POCT INT CON NEG: NEGATIVE
POCT INT CON POS: POSITIVE

## 2024-10-02 PROCEDURE — 83036 HEMOGLOBIN GLYCOSYLATED A1C: CPT | Performed by: FAMILY MEDICINE

## 2024-10-02 PROCEDURE — 3080F DIAST BP >= 90 MM HG: CPT | Performed by: FAMILY MEDICINE

## 2024-10-02 PROCEDURE — 99214 OFFICE O/P EST MOD 30 MIN: CPT | Performed by: FAMILY MEDICINE

## 2024-10-02 PROCEDURE — 82570 ASSAY OF URINE CREATININE: CPT

## 2024-10-02 PROCEDURE — 3077F SYST BP >= 140 MM HG: CPT | Performed by: FAMILY MEDICINE

## 2024-10-02 PROCEDURE — 82043 UR ALBUMIN QUANTITATIVE: CPT

## 2024-10-02 RX ORDER — VALSARTAN 160 MG/1
160 TABLET ORAL DAILY
Qty: 90 TABLET | Refills: 2 | Status: SHIPPED | OUTPATIENT
Start: 2024-10-02

## 2024-10-02 RX ORDER — GABAPENTIN 300 MG/1
1200 CAPSULE ORAL EVERY MORNING
Qty: 120 CAPSULE | Refills: 2 | Status: SHIPPED | OUTPATIENT
Start: 2024-10-02

## 2024-10-02 ASSESSMENT — FIBROSIS 4 INDEX: FIB4 SCORE: 0.68

## 2024-10-27 DIAGNOSIS — E11.65 UNCONTROLLED TYPE 2 DIABETES MELLITUS WITH HYPERGLYCEMIA (HCC): ICD-10-CM

## 2024-10-29 RX ORDER — EMPAGLIFLOZIN 25 MG/1
TABLET, FILM COATED ORAL
Qty: 90 TABLET | Refills: 3 | Status: SHIPPED | OUTPATIENT
Start: 2024-10-29

## 2024-10-31 ENCOUNTER — APPOINTMENT (OUTPATIENT)
Dept: MEDICAL GROUP | Age: 54
End: 2024-10-31
Payer: COMMERCIAL

## 2024-11-16 DIAGNOSIS — E11.65 UNCONTROLLED TYPE 2 DIABETES MELLITUS WITH HYPERGLYCEMIA (HCC): ICD-10-CM

## 2024-11-16 DIAGNOSIS — E11.9 DIABETES MELLITUS WITHOUT COMPLICATION (HCC): ICD-10-CM

## 2024-11-18 NOTE — TELEPHONE ENCOUNTER
Received request via: Patient    Was the patient seen in the last year in this department? Yes    Does the patient have an active prescription (recently filled or refills available) for medication(s) requested? No    Pharmacy Name: [facility]    Does the patient have retirement Plus and need 100-day supply? (This applies to ALL medications) Patient does not have SCP

## 2024-11-19 RX ORDER — SEMAGLUTIDE 1.34 MG/ML
INJECTION, SOLUTION SUBCUTANEOUS
Qty: 3 ML | Refills: 6 | Status: SHIPPED | OUTPATIENT
Start: 2024-11-19

## 2024-11-19 RX ORDER — GABAPENTIN 300 MG/1
1200 CAPSULE ORAL EVERY MORNING
Qty: 120 CAPSULE | Refills: 2 | Status: SHIPPED | OUTPATIENT
Start: 2024-11-19

## 2025-02-04 DIAGNOSIS — R30.0 DYSURIA: ICD-10-CM

## 2025-02-05 RX ORDER — TAMSULOSIN HYDROCHLORIDE 0.4 MG/1
0.4 CAPSULE ORAL
Qty: 90 CAPSULE | Refills: 3 | Status: SHIPPED | OUTPATIENT
Start: 2025-02-05

## 2025-02-11 DIAGNOSIS — E11.65 UNCONTROLLED TYPE 2 DIABETES MELLITUS WITH HYPERGLYCEMIA (HCC): ICD-10-CM

## 2025-02-11 DIAGNOSIS — E11.9 DIABETES MELLITUS WITHOUT COMPLICATION (HCC): ICD-10-CM

## 2025-02-12 RX ORDER — SEMAGLUTIDE 1.34 MG/ML
INJECTION, SOLUTION SUBCUTANEOUS
Qty: 3 ML | Refills: 6 | Status: SHIPPED | OUTPATIENT
Start: 2025-02-12

## 2025-02-12 RX ORDER — OMEPRAZOLE 20 MG/1
20 CAPSULE, DELAYED RELEASE ORAL DAILY
Qty: 90 CAPSULE | Refills: 0 | Status: SHIPPED | OUTPATIENT
Start: 2025-02-12

## 2025-02-12 RX ORDER — GABAPENTIN 300 MG/1
1200 CAPSULE ORAL EVERY MORNING
Qty: 120 CAPSULE | Refills: 2 | Status: SHIPPED | OUTPATIENT
Start: 2025-02-12

## 2025-02-16 DIAGNOSIS — E11.65 UNCONTROLLED TYPE 2 DIABETES MELLITUS WITH HYPERGLYCEMIA (HCC): ICD-10-CM

## 2025-02-18 RX ORDER — GLIPIZIDE 5 MG/1
5 TABLET ORAL DAILY
Qty: 90 TABLET | Refills: 3 | Status: SHIPPED | OUTPATIENT
Start: 2025-02-18

## 2025-03-27 DIAGNOSIS — R73.9 HYPERGLYCEMIA: ICD-10-CM

## 2025-04-29 RX ORDER — ATORVASTATIN CALCIUM 80 MG/1
TABLET, FILM COATED ORAL
Qty: 90 TABLET | Refills: 3 | Status: SHIPPED | OUTPATIENT
Start: 2025-04-29

## 2025-05-12 DIAGNOSIS — E11.65 UNCONTROLLED TYPE 2 DIABETES MELLITUS WITH HYPERGLYCEMIA (HCC): ICD-10-CM

## 2025-05-14 RX ORDER — OMEPRAZOLE 20 MG/1
20 CAPSULE, DELAYED RELEASE ORAL DAILY
Qty: 90 CAPSULE | Refills: 3 | Status: SHIPPED | OUTPATIENT
Start: 2025-05-14

## 2025-05-14 NOTE — TELEPHONE ENCOUNTER
Received request via: Pharmacy    Was the patient seen in the last year in this department? Yes    Does the patient have an active prescription (recently filled or refills available) for medication(s) requested? No    Pharmacy Name: [facility] - [address]     Does the patient have assisted Plus and need 100-day supply? (This applies to ALL medications) Patient does not have SCP

## 2025-05-31 DIAGNOSIS — E11.9 DIABETES MELLITUS WITHOUT COMPLICATION (HCC): ICD-10-CM

## 2025-05-31 DIAGNOSIS — E11.65 UNCONTROLLED TYPE 2 DIABETES MELLITUS WITH HYPERGLYCEMIA (HCC): ICD-10-CM

## 2025-06-03 RX ORDER — GABAPENTIN 300 MG/1
1200 CAPSULE ORAL EVERY MORNING
Qty: 120 CAPSULE | Refills: 2 | Status: SHIPPED | OUTPATIENT
Start: 2025-06-03

## 2025-06-03 RX ORDER — SEMAGLUTIDE 1.34 MG/ML
INJECTION, SOLUTION SUBCUTANEOUS
Qty: 3 ML | Refills: 6 | Status: SHIPPED | OUTPATIENT
Start: 2025-06-03

## 2025-06-10 DIAGNOSIS — I10 ESSENTIAL HYPERTENSION: ICD-10-CM

## 2025-06-11 RX ORDER — VALSARTAN 160 MG/1
160 TABLET ORAL DAILY
Qty: 90 TABLET | Refills: 3 | Status: SHIPPED | OUTPATIENT
Start: 2025-06-11

## 2025-06-25 DIAGNOSIS — R73.9 HYPERGLYCEMIA: ICD-10-CM

## 2025-06-26 NOTE — TELEPHONE ENCOUNTER
Received request via: Pharmacy    Was the patient seen in the last year in this department? Yes    Does the patient have an active prescription (recently filled or refills available) for medication(s) requested? No    Pharmacy Name: [facility] - [address]     Does the patient have senior living Plus and need 100-day supply? (This applies to ALL medications) Patient does not have SCP   Requested Prescriptions     Pending Prescriptions Disp Refills    metformin (GLUCOPHAGE) 1000 MG tablet [Pharmacy Med Name: METFORMIN HCL TABS 1000MG] 180 Tablet 3     Sig: TAKE 1 TABLET TWICE A DAY WITH MEALS (NEED SEEN)

## 2025-07-03 ENCOUNTER — OFFICE VISIT (OUTPATIENT)
Dept: URGENT CARE | Facility: PHYSICIAN GROUP | Age: 55
End: 2025-07-03
Payer: COMMERCIAL

## 2025-07-03 VITALS
HEIGHT: 70 IN | OXYGEN SATURATION: 93 % | WEIGHT: 255.8 LBS | SYSTOLIC BLOOD PRESSURE: 160 MMHG | BODY MASS INDEX: 36.62 KG/M2 | RESPIRATION RATE: 16 BRPM | DIASTOLIC BLOOD PRESSURE: 88 MMHG | TEMPERATURE: 97.3 F | HEART RATE: 85 BPM

## 2025-07-03 DIAGNOSIS — J01.90 ACUTE BACTERIAL SINUSITIS: Primary | ICD-10-CM

## 2025-07-03 DIAGNOSIS — B96.89 ACUTE BACTERIAL SINUSITIS: Primary | ICD-10-CM

## 2025-07-03 PROCEDURE — 3077F SYST BP >= 140 MM HG: CPT | Performed by: STUDENT IN AN ORGANIZED HEALTH CARE EDUCATION/TRAINING PROGRAM

## 2025-07-03 PROCEDURE — 3079F DIAST BP 80-89 MM HG: CPT | Performed by: STUDENT IN AN ORGANIZED HEALTH CARE EDUCATION/TRAINING PROGRAM

## 2025-07-03 PROCEDURE — 99213 OFFICE O/P EST LOW 20 MIN: CPT | Performed by: STUDENT IN AN ORGANIZED HEALTH CARE EDUCATION/TRAINING PROGRAM

## 2025-07-03 ASSESSMENT — FIBROSIS 4 INDEX: FIB4 SCORE: 0.68

## 2025-07-03 NOTE — PROGRESS NOTES
"Subjective:   Andrez Sanchez is a 54 y.o. male who presents for URI (Right ear clogged, mucus, congestion, minimal cough,  feeling better but wants some meds. X 40 days.)      HPI:  54-year-old male presents to the urgent care for just over 1 month of nasal congestion, postnasal drip, thick nasal discharge, and cough.  States that the cough has improved but his main issue is the sinuses.  Sinuses are worse on the right side than the left.    Medications:    amoxicillin-clavulanate Tabs  aspirin  atorvastatin  fish oil Caps  gabapentin Caps  glipiZIDE Tabs  Jardiance Tabs  metformin  omeprazole  Ozempic (1 MG/DOSE) Sopn  tamsulosin  valsartan Tabs  Vitamin D3 Tabs    Allergies: Patient has no known allergies.    Problem List: Andrez Sanchez does not have any pertinent problems on file.    Surgical History:  Past Surgical History:   Procedure Laterality Date    UVULOPHARYNGOPALATOPLASTY  2006    uvulectomy for sleep apnea    AV FISTULA REVISION  1973    Repair Congenital Chest AV Fistula       Past Social Hx: Andrez Sanchez  reports that he has never smoked. He has never been exposed to tobacco smoke. He has never used smokeless tobacco. He reports that he does not currently use alcohol. He reports that he does not use drugs.     Past Family Hx:  Andrez Sanchez family history includes Cancer in his mother; Diabetes in his paternal grandmother.     Problem list, medications, and allergies reviewed by myself today in Epic.     Objective:     BP (!) 160/88 (BP Location: Left arm, Patient Position: Sitting, BP Cuff Size: Large adult)   Pulse 85   Temp 36.3 °C (97.3 °F)   Resp 16   Ht 1.778 m (5' 10\")   Wt 116 kg (255 lb 12.8 oz)   SpO2 93%   BMI 36.70 kg/m²     Physical Exam  Vitals reviewed.   HENT:      Right Ear: Tympanic membrane, ear canal and external ear normal.      Left Ear: Tympanic membrane, ear canal and external ear normal.      Nose: Congestion present.      Right Sinus: " Maxillary sinus tenderness present. No frontal sinus tenderness.      Left Sinus: No maxillary sinus tenderness or frontal sinus tenderness.      Mouth/Throat:      Mouth: Mucous membranes are moist.      Pharynx: Postnasal drip present. No posterior oropharyngeal erythema.   Cardiovascular:      Rate and Rhythm: Normal rate and regular rhythm.      Pulses: Normal pulses.      Heart sounds: Normal heart sounds. No murmur heard.  Pulmonary:      Effort: Pulmonary effort is normal. No respiratory distress.      Breath sounds: Normal breath sounds. No stridor. No wheezing, rhonchi or rales.         Assessment/Plan:     Diagnosis and associated orders:     1. Acute bacterial sinusitis  amoxicillin-clavulanate (AUGMENTIN) 875-125 MG Tab         Comments/MDM:     Patient's presentation and physical exam findings are consistent with acute bacterial sinusitis.  Ongoing symptoms for over 1 month.  Worse on the right than the left.  Pulmonary exam is reassuring and shows no wheezing, rales, rhonchi.  No signs of pneumonia.  Augmentin prescribed which the patient is agreeable to.  Discussed typical timeframe for improvement in symptoms.         Differential diagnosis, natural history, supportive care, and indications for immediate follow-up discussed.    Advised the patient to follow-up with the primary care physician for recheck, reevaluation, and consideration of further management.    Please note that this dictation was created using voice recognition software. I have made a reasonable attempt to correct obvious errors, but I expect that there are errors of grammar and possibly content that I did not discover before finalizing the note.    Electronically signed by Berry Salvador PA-C.

## 2025-08-16 DIAGNOSIS — E11.9 DIABETES MELLITUS WITHOUT COMPLICATION (HCC): ICD-10-CM

## 2025-08-19 RX ORDER — GABAPENTIN 300 MG/1
1200 CAPSULE ORAL EVERY MORNING
Qty: 120 CAPSULE | Refills: 2 | Status: SHIPPED | OUTPATIENT
Start: 2025-08-19